# Patient Record
Sex: MALE | Race: WHITE | NOT HISPANIC OR LATINO
[De-identification: names, ages, dates, MRNs, and addresses within clinical notes are randomized per-mention and may not be internally consistent; named-entity substitution may affect disease eponyms.]

---

## 2017-02-11 ENCOUNTER — TRANSCRIPTION ENCOUNTER (OUTPATIENT)
Age: 72
End: 2017-02-11

## 2017-04-17 ENCOUNTER — EMERGENCY (EMERGENCY)
Facility: HOSPITAL | Age: 72
LOS: 1 days | Discharge: HOME | End: 2017-04-17
Attending: EMERGENCY MEDICINE | Admitting: EMERGENCY MEDICINE
Payer: MEDICARE

## 2017-04-17 VITALS
RESPIRATION RATE: 16 BRPM | HEIGHT: 64 IN | WEIGHT: 190.04 LBS | HEART RATE: 96 BPM | TEMPERATURE: 98 F | OXYGEN SATURATION: 99 % | DIASTOLIC BLOOD PRESSURE: 82 MMHG | SYSTOLIC BLOOD PRESSURE: 149 MMHG

## 2017-04-17 VITALS
TEMPERATURE: 98 F | RESPIRATION RATE: 15 BRPM | HEART RATE: 91 BPM | DIASTOLIC BLOOD PRESSURE: 77 MMHG | SYSTOLIC BLOOD PRESSURE: 151 MMHG | OXYGEN SATURATION: 99 %

## 2017-04-17 DIAGNOSIS — Z85.118 PERSONAL HISTORY OF OTHER MALIGNANT NEOPLASM OF BRONCHUS AND LUNG: ICD-10-CM

## 2017-04-17 DIAGNOSIS — E11.9 TYPE 2 DIABETES MELLITUS WITHOUT COMPLICATIONS: ICD-10-CM

## 2017-04-17 DIAGNOSIS — E78.00 PURE HYPERCHOLESTEROLEMIA, UNSPECIFIED: ICD-10-CM

## 2017-04-17 DIAGNOSIS — N20.0 CALCULUS OF KIDNEY: ICD-10-CM

## 2017-04-17 DIAGNOSIS — Z98.89 OTHER SPECIFIED POSTPROCEDURAL STATES: Chronic | ICD-10-CM

## 2017-04-17 DIAGNOSIS — I11.0 HYPERTENSIVE HEART DISEASE WITH HEART FAILURE: ICD-10-CM

## 2017-04-17 DIAGNOSIS — Z86.73 PERSONAL HISTORY OF TRANSIENT ISCHEMIC ATTACK (TIA), AND CEREBRAL INFARCTION WITHOUT RESIDUAL DEFICITS: ICD-10-CM

## 2017-04-17 DIAGNOSIS — Z98.890 OTHER SPECIFIED POSTPROCEDURAL STATES: ICD-10-CM

## 2017-04-17 DIAGNOSIS — I50.9 HEART FAILURE, UNSPECIFIED: ICD-10-CM

## 2017-04-17 LAB
ALBUMIN SERPL ELPH-MCNC: 4.2 G/DL — SIGNIFICANT CHANGE UP (ref 3.3–5)
ALP SERPL-CCNC: 61 U/L — SIGNIFICANT CHANGE UP (ref 40–120)
ALT FLD-CCNC: 27 U/L — SIGNIFICANT CHANGE UP (ref 12–78)
ANION GAP SERPL CALC-SCNC: 9 MMOL/L — SIGNIFICANT CHANGE UP (ref 5–17)
ANISOCYTOSIS BLD QL: SLIGHT — SIGNIFICANT CHANGE UP
APPEARANCE UR: CLEAR — SIGNIFICANT CHANGE UP
AST SERPL-CCNC: 16 U/L — SIGNIFICANT CHANGE UP (ref 15–37)
BASO STIPL BLD QL SMEAR: PRESENT — SIGNIFICANT CHANGE UP
BASOPHILS # BLD AUTO: 0.1 K/UL — SIGNIFICANT CHANGE UP (ref 0–0.2)
BASOPHILS NFR BLD AUTO: 1.2 % — SIGNIFICANT CHANGE UP (ref 0–2)
BILIRUB SERPL-MCNC: 0.4 MG/DL — SIGNIFICANT CHANGE UP (ref 0.2–1.2)
BILIRUB UR-MCNC: NEGATIVE — SIGNIFICANT CHANGE UP
BUN SERPL-MCNC: 27 MG/DL — HIGH (ref 7–23)
CALCIUM SERPL-MCNC: 10.5 MG/DL — HIGH (ref 8.5–10.1)
CHLORIDE SERPL-SCNC: 101 MMOL/L — SIGNIFICANT CHANGE UP (ref 96–108)
CO2 SERPL-SCNC: 29 MMOL/L — SIGNIFICANT CHANGE UP (ref 22–31)
COLOR SPEC: YELLOW — SIGNIFICANT CHANGE UP
CREAT SERPL-MCNC: 1.4 MG/DL — HIGH (ref 0.5–1.3)
DACRYOCYTES BLD QL SMEAR: SLIGHT — SIGNIFICANT CHANGE UP
DIFF PNL FLD: ABNORMAL
ELLIPTOCYTES BLD QL SMEAR: SLIGHT — SIGNIFICANT CHANGE UP
EOSINOPHIL # BLD AUTO: 0.1 K/UL — SIGNIFICANT CHANGE UP (ref 0–0.5)
EOSINOPHIL NFR BLD AUTO: 1.7 % — SIGNIFICANT CHANGE UP (ref 0–6)
EPI CELLS # UR: SIGNIFICANT CHANGE UP
GLUCOSE SERPL-MCNC: 156 MG/DL — HIGH (ref 70–99)
GLUCOSE UR QL: 300 MG/DL
HCT VFR BLD CALC: 42.7 % — SIGNIFICANT CHANGE UP (ref 39–50)
HGB BLD-MCNC: 13 G/DL — SIGNIFICANT CHANGE UP (ref 13–17)
HYPERCHROMIA BLD QL AUTO: SLIGHT — SIGNIFICANT CHANGE UP
HYPOCHROMIA BLD QL: SLIGHT — SIGNIFICANT CHANGE UP
KETONES UR-MCNC: NEGATIVE — SIGNIFICANT CHANGE UP
LEUKOCYTE ESTERASE UR-ACNC: NEGATIVE — SIGNIFICANT CHANGE UP
LG PLATELETS BLD QL AUTO: SLIGHT — SIGNIFICANT CHANGE UP
LYMPHOCYTES # BLD AUTO: 2.8 K/UL — SIGNIFICANT CHANGE UP (ref 1–3.3)
LYMPHOCYTES # BLD AUTO: 33.8 % — SIGNIFICANT CHANGE UP (ref 13–44)
MCHC RBC-ENTMCNC: 19 PG — LOW (ref 27–34)
MCHC RBC-ENTMCNC: 30.5 GM/DL — LOW (ref 32–36)
MCV RBC AUTO: 62.3 FL — LOW (ref 80–100)
MICROCYTES BLD QL: SIGNIFICANT CHANGE UP
MONOCYTES # BLD AUTO: 0.6 K/UL — SIGNIFICANT CHANGE UP (ref 0–0.9)
MONOCYTES NFR BLD AUTO: 7.4 % — SIGNIFICANT CHANGE UP (ref 1–9)
NEUTROPHILS # BLD AUTO: 4.7 K/UL — SIGNIFICANT CHANGE UP (ref 1.8–7.4)
NEUTROPHILS NFR BLD AUTO: 56 % — SIGNIFICANT CHANGE UP (ref 43–77)
NITRITE UR-MCNC: NEGATIVE — SIGNIFICANT CHANGE UP
OVALOCYTES BLD QL SMEAR: SLIGHT — SIGNIFICANT CHANGE UP
PH UR: 5 — SIGNIFICANT CHANGE UP (ref 4.8–8)
PLAT MORPH BLD: NORMAL — SIGNIFICANT CHANGE UP
PLATELET # BLD AUTO: 257 K/UL — SIGNIFICANT CHANGE UP (ref 150–400)
POIKILOCYTOSIS BLD QL AUTO: SLIGHT — SIGNIFICANT CHANGE UP
POLYCHROMASIA BLD QL SMEAR: SLIGHT — SIGNIFICANT CHANGE UP
POTASSIUM SERPL-MCNC: 4.7 MMOL/L — SIGNIFICANT CHANGE UP (ref 3.5–5.3)
POTASSIUM SERPL-SCNC: 4.7 MMOL/L — SIGNIFICANT CHANGE UP (ref 3.5–5.3)
PROT SERPL-MCNC: 7.8 G/DL — SIGNIFICANT CHANGE UP (ref 6–8.3)
PROT UR-MCNC: 25 MG/DL
RBC # BLD: 6.85 M/UL — HIGH (ref 4.2–5.8)
RBC # FLD: 15.1 % — HIGH (ref 10.3–14.5)
RBC BLD AUTO: ABNORMAL
RBC CASTS # UR COMP ASSIST: SIGNIFICANT CHANGE UP /HPF (ref 0–4)
SODIUM SERPL-SCNC: 139 MMOL/L — SIGNIFICANT CHANGE UP (ref 135–145)
SP GR SPEC: 1.01 — SIGNIFICANT CHANGE UP (ref 1.01–1.02)
UROBILINOGEN FLD QL: NEGATIVE — SIGNIFICANT CHANGE UP
WBC # BLD: 8.4 K/UL — SIGNIFICANT CHANGE UP (ref 3.8–10.5)
WBC # FLD AUTO: 8.4 K/UL — SIGNIFICANT CHANGE UP (ref 3.8–10.5)
WBC UR QL: SIGNIFICANT CHANGE UP

## 2017-04-17 PROCEDURE — 99284 EMERGENCY DEPT VISIT MOD MDM: CPT | Mod: 25

## 2017-04-17 PROCEDURE — 74176 CT ABD & PELVIS W/O CONTRAST: CPT | Mod: 26

## 2017-04-17 PROCEDURE — 96360 HYDRATION IV INFUSION INIT: CPT

## 2017-04-17 PROCEDURE — 87086 URINE CULTURE/COLONY COUNT: CPT

## 2017-04-17 PROCEDURE — 81001 URINALYSIS AUTO W/SCOPE: CPT

## 2017-04-17 PROCEDURE — 80053 COMPREHEN METABOLIC PANEL: CPT

## 2017-04-17 PROCEDURE — 85027 COMPLETE CBC AUTOMATED: CPT

## 2017-04-17 PROCEDURE — 99284 EMERGENCY DEPT VISIT MOD MDM: CPT

## 2017-04-17 PROCEDURE — 74176 CT ABD & PELVIS W/O CONTRAST: CPT

## 2017-04-17 RX ORDER — SODIUM CHLORIDE 9 MG/ML
1000 INJECTION INTRAMUSCULAR; INTRAVENOUS; SUBCUTANEOUS ONCE
Qty: 0 | Refills: 0 | Status: COMPLETED | OUTPATIENT
Start: 2017-04-17 | End: 2017-04-17

## 2017-04-17 RX ADMIN — SODIUM CHLORIDE 1000 MILLILITER(S): 9 INJECTION INTRAMUSCULAR; INTRAVENOUS; SUBCUTANEOUS at 12:49

## 2017-04-17 NOTE — ED PROVIDER NOTE - OBJECTIVE STATEMENT
71 male presents to ER c/o lower abdominal disomfort, left flank pain, started last night, getting worse today, took a vicodin with some releif. Having some nausea, no vomiting.

## 2017-04-17 NOTE — ED ADULT TRIAGE NOTE - CHIEF COMPLAINT QUOTE
"I've had pain in the left side of my back on & off for a few days. I had stomach pain yesterday & some nausea today. The pain got worse today so I took Vicodin. I have left lower leg cellulitis, I'm on clindamycin"

## 2017-04-17 NOTE — ED ADULT NURSE NOTE - OBJECTIVE STATEMENT
I've had pain in the left side of my back on & off for a few days. I had stomach pain yesterday & some nausea today. The pain got worse today so I took Vicodin. I have left lower leg cellulitis, I'm on clindamycin". Afebrile, no recent sicks. left lower leg red with some abrasions

## 2017-04-17 NOTE — ED ADULT NURSE NOTE - PSH
History of throat surgery  1996  S/P angioplasty with stent  2005 and 2006  S/P partial lobectomy of lung  lobectomy of left upper lobe. April 2014

## 2017-04-17 NOTE — ED ADULT NURSE NOTE - PMH
Cellulitis    CHF (congestive heart failure)    Chronic back pain    Diabetes  on metformin, humalog insulin pump  Heart attack  1990,2005  Hiatal hernia    High cholesterol    HTN (hypertension)    Insulin pump in place    Lumbar herniated disc    Lung cancer, upper lobe, left    OA (osteoarthritis)    Sleep apnea  uses c pap at home  Spinal stenosis in cervical region    Spinal stenosis of lumbar region    Stented coronary artery  six cardiac stents  Stented coronary artery  Drug eluding x 6 stents  TIA (transient ischemic attack)  Jan 2014  Type 2 diabetes mellitus    Vertigo

## 2017-04-18 LAB
CULTURE RESULTS: NO GROWTH — SIGNIFICANT CHANGE UP
SPECIMEN SOURCE: SIGNIFICANT CHANGE UP

## 2018-01-23 ENCOUNTER — EMERGENCY (EMERGENCY)
Facility: HOSPITAL | Age: 73
LOS: 1 days | Discharge: HOME | End: 2018-01-23
Attending: EMERGENCY MEDICINE | Admitting: EMERGENCY MEDICINE
Payer: COMMERCIAL

## 2018-01-23 VITALS
HEIGHT: 64 IN | DIASTOLIC BLOOD PRESSURE: 96 MMHG | HEART RATE: 73 BPM | WEIGHT: 190.04 LBS | OXYGEN SATURATION: 100 % | SYSTOLIC BLOOD PRESSURE: 173 MMHG | TEMPERATURE: 98 F | RESPIRATION RATE: 15 BRPM

## 2018-01-23 DIAGNOSIS — Z86.73 PERSONAL HISTORY OF TRANSIENT ISCHEMIC ATTACK (TIA), AND CEREBRAL INFARCTION WITHOUT RESIDUAL DEFICITS: ICD-10-CM

## 2018-01-23 DIAGNOSIS — E78.00 PURE HYPERCHOLESTEROLEMIA, UNSPECIFIED: ICD-10-CM

## 2018-01-23 DIAGNOSIS — Z98.89 OTHER SPECIFIED POSTPROCEDURAL STATES: Chronic | ICD-10-CM

## 2018-01-23 DIAGNOSIS — Z95.5 PRESENCE OF CORONARY ANGIOPLASTY IMPLANT AND GRAFT: ICD-10-CM

## 2018-01-23 DIAGNOSIS — Z87.891 PERSONAL HISTORY OF NICOTINE DEPENDENCE: ICD-10-CM

## 2018-01-23 DIAGNOSIS — Z85.118 PERSONAL HISTORY OF OTHER MALIGNANT NEOPLASM OF BRONCHUS AND LUNG: ICD-10-CM

## 2018-01-23 DIAGNOSIS — Z79.82 LONG TERM (CURRENT) USE OF ASPIRIN: ICD-10-CM

## 2018-01-23 DIAGNOSIS — Z79.4 LONG TERM (CURRENT) USE OF INSULIN: ICD-10-CM

## 2018-01-23 DIAGNOSIS — Z04.1 ENCOUNTER FOR EXAMINATION AND OBSERVATION FOLLOWING TRANSPORT ACCIDENT: ICD-10-CM

## 2018-01-23 DIAGNOSIS — E11.9 TYPE 2 DIABETES MELLITUS WITHOUT COMPLICATIONS: ICD-10-CM

## 2018-01-23 DIAGNOSIS — I11.0 HYPERTENSIVE HEART DISEASE WITH HEART FAILURE: ICD-10-CM

## 2018-01-23 DIAGNOSIS — I50.9 HEART FAILURE, UNSPECIFIED: ICD-10-CM

## 2018-01-23 PROCEDURE — 99053 MED SERV 10PM-8AM 24 HR FAC: CPT

## 2018-01-23 PROCEDURE — 99282 EMERGENCY DEPT VISIT SF MDM: CPT

## 2018-01-23 NOTE — ED PROVIDER NOTE - CHPI ED SYMPTOMS NEG
no back pain/no bruising/no difficulty bearing weight/no laceration/no decreased eating/drinking/no neck tenderness/no dizziness/no headache/no pain/no loss of consciousness/no disorientation
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
## 2018-01-23 NOTE — ED PROVIDER NOTE - MUSCULOSKELETAL, MLM
Spine appears normal, range of motion is not limited, no muscle or joint tenderness. speech clear, gait steady.  c-spine non tender, supple.  NO MLT in spine.  pelvis stable, non tender. hips FROM

## 2018-01-23 NOTE — ED ADULT NURSE NOTE - CHPI ED SYMPTOMS NEG
no dizziness/no crying/no decreased eating/drinking/no laceration/no loss of consciousness/no bruising/no neck tenderness/no headache/no fussiness/no disorientation/no difficulty bearing weight/no sleeping issues/no pain/no back pain

## 2018-04-27 ENCOUNTER — OUTPATIENT (OUTPATIENT)
Dept: OUTPATIENT SERVICES | Facility: HOSPITAL | Age: 73
LOS: 1 days | Discharge: HOME | End: 2018-04-27
Payer: MEDICARE

## 2018-04-27 DIAGNOSIS — Z98.89 OTHER SPECIFIED POSTPROCEDURAL STATES: Chronic | ICD-10-CM

## 2018-04-27 DIAGNOSIS — L97.801 NON-PRESSURE CHRONIC ULCER OF OTHER PART OF UNSPECIFIED LOWER LEG LIMITED TO BREAKDOWN OF SKIN: ICD-10-CM

## 2018-04-27 PROCEDURE — G0463: CPT

## 2018-04-28 DIAGNOSIS — M48.00 SPINAL STENOSIS, SITE UNSPECIFIED: ICD-10-CM

## 2018-04-28 DIAGNOSIS — E78.5 HYPERLIPIDEMIA, UNSPECIFIED: ICD-10-CM

## 2018-04-28 DIAGNOSIS — Z79.82 LONG TERM (CURRENT) USE OF ASPIRIN: ICD-10-CM

## 2018-04-28 DIAGNOSIS — Z90.2 ACQUIRED ABSENCE OF LUNG [PART OF]: ICD-10-CM

## 2018-04-28 DIAGNOSIS — L97.821 NON-PRESSURE CHRONIC ULCER OF OTHER PART OF LEFT LOWER LEG LIMITED TO BREAKDOWN OF SKIN: ICD-10-CM

## 2018-04-28 DIAGNOSIS — M12.9 ARTHROPATHY, UNSPECIFIED: ICD-10-CM

## 2018-04-28 DIAGNOSIS — G47.30 SLEEP APNEA, UNSPECIFIED: ICD-10-CM

## 2018-04-28 DIAGNOSIS — Z87.891 PERSONAL HISTORY OF NICOTINE DEPENDENCE: ICD-10-CM

## 2018-04-28 DIAGNOSIS — I50.9 HEART FAILURE, UNSPECIFIED: ICD-10-CM

## 2018-04-28 DIAGNOSIS — I25.10 ATHEROSCLEROTIC HEART DISEASE OF NATIVE CORONARY ARTERY WITHOUT ANGINA PECTORIS: ICD-10-CM

## 2018-04-28 DIAGNOSIS — Z85.118 PERSONAL HISTORY OF OTHER MALIGNANT NEOPLASM OF BRONCHUS AND LUNG: ICD-10-CM

## 2018-04-28 DIAGNOSIS — Z79.899 OTHER LONG TERM (CURRENT) DRUG THERAPY: ICD-10-CM

## 2018-04-28 DIAGNOSIS — E11.51 TYPE 2 DIABETES MELLITUS WITH DIABETIC PERIPHERAL ANGIOPATHY WITHOUT GANGRENE: ICD-10-CM

## 2018-04-28 DIAGNOSIS — Z98.61 CORONARY ANGIOPLASTY STATUS: ICD-10-CM

## 2018-04-28 DIAGNOSIS — I10 ESSENTIAL (PRIMARY) HYPERTENSION: ICD-10-CM

## 2018-04-28 DIAGNOSIS — Z79.4 LONG TERM (CURRENT) USE OF INSULIN: ICD-10-CM

## 2018-04-28 DIAGNOSIS — E66.9 OBESITY, UNSPECIFIED: ICD-10-CM

## 2018-04-28 DIAGNOSIS — Z83.3 FAMILY HISTORY OF DIABETES MELLITUS: ICD-10-CM

## 2018-05-04 ENCOUNTER — OUTPATIENT (OUTPATIENT)
Dept: OUTPATIENT SERVICES | Facility: HOSPITAL | Age: 73
LOS: 1 days | Discharge: HOME | End: 2018-05-04
Payer: MEDICARE

## 2018-05-04 DIAGNOSIS — L97.801 NON-PRESSURE CHRONIC ULCER OF OTHER PART OF UNSPECIFIED LOWER LEG LIMITED TO BREAKDOWN OF SKIN: ICD-10-CM

## 2018-05-04 DIAGNOSIS — Z98.89 OTHER SPECIFIED POSTPROCEDURAL STATES: Chronic | ICD-10-CM

## 2018-05-04 DIAGNOSIS — L97.821 NON-PRESSURE CHRONIC ULCER OF OTHER PART OF LEFT LOWER LEG LIMITED TO BREAKDOWN OF SKIN: ICD-10-CM

## 2018-05-04 PROCEDURE — 97602 WOUND(S) CARE NON-SELECTIVE: CPT

## 2018-05-05 DIAGNOSIS — Z90.2 ACQUIRED ABSENCE OF LUNG [PART OF]: ICD-10-CM

## 2018-05-05 DIAGNOSIS — I50.9 HEART FAILURE, UNSPECIFIED: ICD-10-CM

## 2018-05-05 DIAGNOSIS — Z83.3 FAMILY HISTORY OF DIABETES MELLITUS: ICD-10-CM

## 2018-05-05 DIAGNOSIS — E66.9 OBESITY, UNSPECIFIED: ICD-10-CM

## 2018-05-05 DIAGNOSIS — M12.9 ARTHROPATHY, UNSPECIFIED: ICD-10-CM

## 2018-05-05 DIAGNOSIS — G47.30 SLEEP APNEA, UNSPECIFIED: ICD-10-CM

## 2018-05-05 DIAGNOSIS — M48.00 SPINAL STENOSIS, SITE UNSPECIFIED: ICD-10-CM

## 2018-05-05 DIAGNOSIS — I25.10 ATHEROSCLEROTIC HEART DISEASE OF NATIVE CORONARY ARTERY WITHOUT ANGINA PECTORIS: ICD-10-CM

## 2018-05-05 DIAGNOSIS — Z85.118 PERSONAL HISTORY OF OTHER MALIGNANT NEOPLASM OF BRONCHUS AND LUNG: ICD-10-CM

## 2018-05-05 DIAGNOSIS — Z79.82 LONG TERM (CURRENT) USE OF ASPIRIN: ICD-10-CM

## 2018-05-05 DIAGNOSIS — Z87.891 PERSONAL HISTORY OF NICOTINE DEPENDENCE: ICD-10-CM

## 2018-05-05 DIAGNOSIS — I10 ESSENTIAL (PRIMARY) HYPERTENSION: ICD-10-CM

## 2018-05-05 DIAGNOSIS — E78.5 HYPERLIPIDEMIA, UNSPECIFIED: ICD-10-CM

## 2018-05-05 DIAGNOSIS — Z98.61 CORONARY ANGIOPLASTY STATUS: ICD-10-CM

## 2018-05-05 DIAGNOSIS — Z79.899 OTHER LONG TERM (CURRENT) DRUG THERAPY: ICD-10-CM

## 2018-05-05 DIAGNOSIS — L97.821 NON-PRESSURE CHRONIC ULCER OF OTHER PART OF LEFT LOWER LEG LIMITED TO BREAKDOWN OF SKIN: ICD-10-CM

## 2018-05-05 DIAGNOSIS — E11.51 TYPE 2 DIABETES MELLITUS WITH DIABETIC PERIPHERAL ANGIOPATHY WITHOUT GANGRENE: ICD-10-CM

## 2018-05-05 DIAGNOSIS — Z79.4 LONG TERM (CURRENT) USE OF INSULIN: ICD-10-CM

## 2018-05-11 ENCOUNTER — OUTPATIENT (OUTPATIENT)
Dept: OUTPATIENT SERVICES | Facility: HOSPITAL | Age: 73
LOS: 1 days | Discharge: HOME | End: 2018-05-11
Payer: MEDICARE

## 2018-05-11 DIAGNOSIS — Z98.89 OTHER SPECIFIED POSTPROCEDURAL STATES: Chronic | ICD-10-CM

## 2018-05-11 DIAGNOSIS — L97.821 NON-PRESSURE CHRONIC ULCER OF OTHER PART OF LEFT LOWER LEG LIMITED TO BREAKDOWN OF SKIN: ICD-10-CM

## 2018-05-11 PROCEDURE — 97602 WOUND(S) CARE NON-SELECTIVE: CPT

## 2018-05-12 DIAGNOSIS — G47.30 SLEEP APNEA, UNSPECIFIED: ICD-10-CM

## 2018-05-12 DIAGNOSIS — Z79.82 LONG TERM (CURRENT) USE OF ASPIRIN: ICD-10-CM

## 2018-05-12 DIAGNOSIS — I25.10 ATHEROSCLEROTIC HEART DISEASE OF NATIVE CORONARY ARTERY WITHOUT ANGINA PECTORIS: ICD-10-CM

## 2018-05-12 DIAGNOSIS — Z83.3 FAMILY HISTORY OF DIABETES MELLITUS: ICD-10-CM

## 2018-05-12 DIAGNOSIS — Z85.118 PERSONAL HISTORY OF OTHER MALIGNANT NEOPLASM OF BRONCHUS AND LUNG: ICD-10-CM

## 2018-05-12 DIAGNOSIS — I10 ESSENTIAL (PRIMARY) HYPERTENSION: ICD-10-CM

## 2018-05-12 DIAGNOSIS — L97.821 NON-PRESSURE CHRONIC ULCER OF OTHER PART OF LEFT LOWER LEG LIMITED TO BREAKDOWN OF SKIN: ICD-10-CM

## 2018-05-12 DIAGNOSIS — Z79.4 LONG TERM (CURRENT) USE OF INSULIN: ICD-10-CM

## 2018-05-12 DIAGNOSIS — Z79.899 OTHER LONG TERM (CURRENT) DRUG THERAPY: ICD-10-CM

## 2018-05-12 DIAGNOSIS — Z90.2 ACQUIRED ABSENCE OF LUNG [PART OF]: ICD-10-CM

## 2018-05-12 DIAGNOSIS — E11.51 TYPE 2 DIABETES MELLITUS WITH DIABETIC PERIPHERAL ANGIOPATHY WITHOUT GANGRENE: ICD-10-CM

## 2018-05-12 DIAGNOSIS — E78.5 HYPERLIPIDEMIA, UNSPECIFIED: ICD-10-CM

## 2018-05-12 DIAGNOSIS — I50.9 HEART FAILURE, UNSPECIFIED: ICD-10-CM

## 2018-05-12 DIAGNOSIS — Z87.891 PERSONAL HISTORY OF NICOTINE DEPENDENCE: ICD-10-CM

## 2018-05-12 DIAGNOSIS — E66.9 OBESITY, UNSPECIFIED: ICD-10-CM

## 2018-05-12 DIAGNOSIS — M12.9 ARTHROPATHY, UNSPECIFIED: ICD-10-CM

## 2018-05-12 DIAGNOSIS — Z98.61 CORONARY ANGIOPLASTY STATUS: ICD-10-CM

## 2018-05-12 DIAGNOSIS — M48.00 SPINAL STENOSIS, SITE UNSPECIFIED: ICD-10-CM

## 2018-05-17 ENCOUNTER — OUTPATIENT (OUTPATIENT)
Dept: OUTPATIENT SERVICES | Facility: HOSPITAL | Age: 73
LOS: 1 days | End: 2018-05-17
Payer: MEDICARE

## 2018-05-17 DIAGNOSIS — Z98.89 OTHER SPECIFIED POSTPROCEDURAL STATES: Chronic | ICD-10-CM

## 2018-05-17 DIAGNOSIS — L97.821 NON-PRESSURE CHRONIC ULCER OF OTHER PART OF LEFT LOWER LEG LIMITED TO BREAKDOWN OF SKIN: ICD-10-CM

## 2018-05-17 PROCEDURE — 93923 UPR/LXTR ART STDY 3+ LVLS: CPT | Mod: 26

## 2018-05-17 PROCEDURE — 93923 UPR/LXTR ART STDY 3+ LVLS: CPT

## 2018-05-18 ENCOUNTER — OUTPATIENT (OUTPATIENT)
Dept: OUTPATIENT SERVICES | Facility: HOSPITAL | Age: 73
LOS: 1 days | Discharge: HOME | End: 2018-05-18
Payer: MEDICARE

## 2018-05-18 DIAGNOSIS — Z98.89 OTHER SPECIFIED POSTPROCEDURAL STATES: Chronic | ICD-10-CM

## 2018-05-18 DIAGNOSIS — L97.821 NON-PRESSURE CHRONIC ULCER OF OTHER PART OF LEFT LOWER LEG LIMITED TO BREAKDOWN OF SKIN: ICD-10-CM

## 2018-05-18 PROCEDURE — 97602 WOUND(S) CARE NON-SELECTIVE: CPT

## 2018-05-19 DIAGNOSIS — I50.9 HEART FAILURE, UNSPECIFIED: ICD-10-CM

## 2018-05-19 DIAGNOSIS — M48.00 SPINAL STENOSIS, SITE UNSPECIFIED: ICD-10-CM

## 2018-05-19 DIAGNOSIS — E66.9 OBESITY, UNSPECIFIED: ICD-10-CM

## 2018-05-19 DIAGNOSIS — M12.9 ARTHROPATHY, UNSPECIFIED: ICD-10-CM

## 2018-05-19 DIAGNOSIS — Z79.899 OTHER LONG TERM (CURRENT) DRUG THERAPY: ICD-10-CM

## 2018-05-19 DIAGNOSIS — Z83.3 FAMILY HISTORY OF DIABETES MELLITUS: ICD-10-CM

## 2018-05-19 DIAGNOSIS — Z79.4 LONG TERM (CURRENT) USE OF INSULIN: ICD-10-CM

## 2018-05-19 DIAGNOSIS — Z79.82 LONG TERM (CURRENT) USE OF ASPIRIN: ICD-10-CM

## 2018-05-19 DIAGNOSIS — Z85.118 PERSONAL HISTORY OF OTHER MALIGNANT NEOPLASM OF BRONCHUS AND LUNG: ICD-10-CM

## 2018-05-19 DIAGNOSIS — G47.30 SLEEP APNEA, UNSPECIFIED: ICD-10-CM

## 2018-05-19 DIAGNOSIS — E78.5 HYPERLIPIDEMIA, UNSPECIFIED: ICD-10-CM

## 2018-05-19 DIAGNOSIS — I25.10 ATHEROSCLEROTIC HEART DISEASE OF NATIVE CORONARY ARTERY WITHOUT ANGINA PECTORIS: ICD-10-CM

## 2018-05-19 DIAGNOSIS — I10 ESSENTIAL (PRIMARY) HYPERTENSION: ICD-10-CM

## 2018-05-19 DIAGNOSIS — Z98.61 CORONARY ANGIOPLASTY STATUS: ICD-10-CM

## 2018-05-19 DIAGNOSIS — Z87.891 PERSONAL HISTORY OF NICOTINE DEPENDENCE: ICD-10-CM

## 2018-05-19 DIAGNOSIS — E11.51 TYPE 2 DIABETES MELLITUS WITH DIABETIC PERIPHERAL ANGIOPATHY WITHOUT GANGRENE: ICD-10-CM

## 2018-05-19 DIAGNOSIS — Z90.2 ACQUIRED ABSENCE OF LUNG [PART OF]: ICD-10-CM

## 2018-05-19 DIAGNOSIS — L97.821 NON-PRESSURE CHRONIC ULCER OF OTHER PART OF LEFT LOWER LEG LIMITED TO BREAKDOWN OF SKIN: ICD-10-CM

## 2018-05-25 ENCOUNTER — OUTPATIENT (OUTPATIENT)
Dept: OUTPATIENT SERVICES | Facility: HOSPITAL | Age: 73
LOS: 1 days | Discharge: HOME | End: 2018-05-25
Payer: MEDICARE

## 2018-05-25 DIAGNOSIS — Z98.89 OTHER SPECIFIED POSTPROCEDURAL STATES: Chronic | ICD-10-CM

## 2018-05-25 DIAGNOSIS — L97.821 NON-PRESSURE CHRONIC ULCER OF OTHER PART OF LEFT LOWER LEG LIMITED TO BREAKDOWN OF SKIN: ICD-10-CM

## 2018-05-25 PROCEDURE — 97602 WOUND(S) CARE NON-SELECTIVE: CPT

## 2018-05-26 DIAGNOSIS — I10 ESSENTIAL (PRIMARY) HYPERTENSION: ICD-10-CM

## 2018-05-26 DIAGNOSIS — E66.9 OBESITY, UNSPECIFIED: ICD-10-CM

## 2018-05-26 DIAGNOSIS — Z79.82 LONG TERM (CURRENT) USE OF ASPIRIN: ICD-10-CM

## 2018-05-26 DIAGNOSIS — Z83.3 FAMILY HISTORY OF DIABETES MELLITUS: ICD-10-CM

## 2018-05-26 DIAGNOSIS — M12.9 ARTHROPATHY, UNSPECIFIED: ICD-10-CM

## 2018-05-26 DIAGNOSIS — Z98.61 CORONARY ANGIOPLASTY STATUS: ICD-10-CM

## 2018-05-26 DIAGNOSIS — Z87.891 PERSONAL HISTORY OF NICOTINE DEPENDENCE: ICD-10-CM

## 2018-05-26 DIAGNOSIS — G47.30 SLEEP APNEA, UNSPECIFIED: ICD-10-CM

## 2018-05-26 DIAGNOSIS — I50.9 HEART FAILURE, UNSPECIFIED: ICD-10-CM

## 2018-05-26 DIAGNOSIS — Z79.4 LONG TERM (CURRENT) USE OF INSULIN: ICD-10-CM

## 2018-05-26 DIAGNOSIS — I25.10 ATHEROSCLEROTIC HEART DISEASE OF NATIVE CORONARY ARTERY WITHOUT ANGINA PECTORIS: ICD-10-CM

## 2018-05-26 DIAGNOSIS — Z85.118 PERSONAL HISTORY OF OTHER MALIGNANT NEOPLASM OF BRONCHUS AND LUNG: ICD-10-CM

## 2018-05-26 DIAGNOSIS — Z90.2 ACQUIRED ABSENCE OF LUNG [PART OF]: ICD-10-CM

## 2018-05-26 DIAGNOSIS — L97.821 NON-PRESSURE CHRONIC ULCER OF OTHER PART OF LEFT LOWER LEG LIMITED TO BREAKDOWN OF SKIN: ICD-10-CM

## 2018-05-26 DIAGNOSIS — E11.51 TYPE 2 DIABETES MELLITUS WITH DIABETIC PERIPHERAL ANGIOPATHY WITHOUT GANGRENE: ICD-10-CM

## 2018-05-26 DIAGNOSIS — E78.5 HYPERLIPIDEMIA, UNSPECIFIED: ICD-10-CM

## 2018-05-26 DIAGNOSIS — Z79.899 OTHER LONG TERM (CURRENT) DRUG THERAPY: ICD-10-CM

## 2018-05-26 DIAGNOSIS — M48.00 SPINAL STENOSIS, SITE UNSPECIFIED: ICD-10-CM

## 2018-06-01 ENCOUNTER — OUTPATIENT (OUTPATIENT)
Dept: OUTPATIENT SERVICES | Facility: HOSPITAL | Age: 73
LOS: 1 days | Discharge: HOME | End: 2018-06-01
Payer: MEDICARE

## 2018-06-01 DIAGNOSIS — Z98.89 OTHER SPECIFIED POSTPROCEDURAL STATES: Chronic | ICD-10-CM

## 2018-06-01 DIAGNOSIS — L97.821 NON-PRESSURE CHRONIC ULCER OF OTHER PART OF LEFT LOWER LEG LIMITED TO BREAKDOWN OF SKIN: ICD-10-CM

## 2018-06-01 PROCEDURE — G0463: CPT

## 2018-06-02 DIAGNOSIS — Z85.118 PERSONAL HISTORY OF OTHER MALIGNANT NEOPLASM OF BRONCHUS AND LUNG: ICD-10-CM

## 2018-06-02 DIAGNOSIS — Z79.82 LONG TERM (CURRENT) USE OF ASPIRIN: ICD-10-CM

## 2018-06-02 DIAGNOSIS — E78.5 HYPERLIPIDEMIA, UNSPECIFIED: ICD-10-CM

## 2018-06-02 DIAGNOSIS — Z79.4 LONG TERM (CURRENT) USE OF INSULIN: ICD-10-CM

## 2018-06-02 DIAGNOSIS — I50.9 HEART FAILURE, UNSPECIFIED: ICD-10-CM

## 2018-06-02 DIAGNOSIS — I10 ESSENTIAL (PRIMARY) HYPERTENSION: ICD-10-CM

## 2018-06-02 DIAGNOSIS — M48.00 SPINAL STENOSIS, SITE UNSPECIFIED: ICD-10-CM

## 2018-06-02 DIAGNOSIS — L97.821 NON-PRESSURE CHRONIC ULCER OF OTHER PART OF LEFT LOWER LEG LIMITED TO BREAKDOWN OF SKIN: ICD-10-CM

## 2018-06-02 DIAGNOSIS — Z83.3 FAMILY HISTORY OF DIABETES MELLITUS: ICD-10-CM

## 2018-06-02 DIAGNOSIS — Z90.2 ACQUIRED ABSENCE OF LUNG [PART OF]: ICD-10-CM

## 2018-06-02 DIAGNOSIS — Z79.899 OTHER LONG TERM (CURRENT) DRUG THERAPY: ICD-10-CM

## 2018-06-02 DIAGNOSIS — M12.9 ARTHROPATHY, UNSPECIFIED: ICD-10-CM

## 2018-06-02 DIAGNOSIS — G47.30 SLEEP APNEA, UNSPECIFIED: ICD-10-CM

## 2018-06-02 DIAGNOSIS — Z87.891 PERSONAL HISTORY OF NICOTINE DEPENDENCE: ICD-10-CM

## 2018-06-02 DIAGNOSIS — I25.10 ATHEROSCLEROTIC HEART DISEASE OF NATIVE CORONARY ARTERY WITHOUT ANGINA PECTORIS: ICD-10-CM

## 2018-06-02 DIAGNOSIS — E11.51 TYPE 2 DIABETES MELLITUS WITH DIABETIC PERIPHERAL ANGIOPATHY WITHOUT GANGRENE: ICD-10-CM

## 2018-06-02 DIAGNOSIS — E66.9 OBESITY, UNSPECIFIED: ICD-10-CM

## 2018-06-02 DIAGNOSIS — Z98.61 CORONARY ANGIOPLASTY STATUS: ICD-10-CM

## 2018-06-08 ENCOUNTER — OUTPATIENT (OUTPATIENT)
Dept: OUTPATIENT SERVICES | Facility: HOSPITAL | Age: 73
LOS: 1 days | Discharge: HOME | End: 2018-06-08
Payer: MEDICARE

## 2018-06-08 DIAGNOSIS — Z98.89 OTHER SPECIFIED POSTPROCEDURAL STATES: Chronic | ICD-10-CM

## 2018-06-08 DIAGNOSIS — L97.821 NON-PRESSURE CHRONIC ULCER OF OTHER PART OF LEFT LOWER LEG LIMITED TO BREAKDOWN OF SKIN: ICD-10-CM

## 2018-06-08 PROCEDURE — G0463: CPT

## 2018-06-10 DIAGNOSIS — E11.51 TYPE 2 DIABETES MELLITUS WITH DIABETIC PERIPHERAL ANGIOPATHY WITHOUT GANGRENE: ICD-10-CM

## 2018-06-10 DIAGNOSIS — L97.821 NON-PRESSURE CHRONIC ULCER OF OTHER PART OF LEFT LOWER LEG LIMITED TO BREAKDOWN OF SKIN: ICD-10-CM

## 2018-06-10 DIAGNOSIS — E66.9 OBESITY, UNSPECIFIED: ICD-10-CM

## 2018-06-10 DIAGNOSIS — I25.10 ATHEROSCLEROTIC HEART DISEASE OF NATIVE CORONARY ARTERY WITHOUT ANGINA PECTORIS: ICD-10-CM

## 2018-06-10 DIAGNOSIS — Z79.899 OTHER LONG TERM (CURRENT) DRUG THERAPY: ICD-10-CM

## 2018-06-10 DIAGNOSIS — Z90.2 ACQUIRED ABSENCE OF LUNG [PART OF]: ICD-10-CM

## 2018-06-10 DIAGNOSIS — E78.5 HYPERLIPIDEMIA, UNSPECIFIED: ICD-10-CM

## 2018-06-10 DIAGNOSIS — Z79.4 LONG TERM (CURRENT) USE OF INSULIN: ICD-10-CM

## 2018-06-10 DIAGNOSIS — Z87.891 PERSONAL HISTORY OF NICOTINE DEPENDENCE: ICD-10-CM

## 2018-06-10 DIAGNOSIS — I50.9 HEART FAILURE, UNSPECIFIED: ICD-10-CM

## 2018-06-10 DIAGNOSIS — Z85.118 PERSONAL HISTORY OF OTHER MALIGNANT NEOPLASM OF BRONCHUS AND LUNG: ICD-10-CM

## 2018-06-10 DIAGNOSIS — M12.9 ARTHROPATHY, UNSPECIFIED: ICD-10-CM

## 2018-06-10 DIAGNOSIS — Z98.61 CORONARY ANGIOPLASTY STATUS: ICD-10-CM

## 2018-06-10 DIAGNOSIS — Z83.3 FAMILY HISTORY OF DIABETES MELLITUS: ICD-10-CM

## 2018-06-10 DIAGNOSIS — Z79.82 LONG TERM (CURRENT) USE OF ASPIRIN: ICD-10-CM

## 2018-06-10 DIAGNOSIS — G47.30 SLEEP APNEA, UNSPECIFIED: ICD-10-CM

## 2018-06-10 DIAGNOSIS — M48.00 SPINAL STENOSIS, SITE UNSPECIFIED: ICD-10-CM

## 2018-06-10 DIAGNOSIS — I10 ESSENTIAL (PRIMARY) HYPERTENSION: ICD-10-CM

## 2018-06-22 ENCOUNTER — OUTPATIENT (OUTPATIENT)
Dept: OUTPATIENT SERVICES | Facility: HOSPITAL | Age: 73
LOS: 1 days | Discharge: HOME | End: 2018-06-22
Payer: MEDICARE

## 2018-06-22 DIAGNOSIS — Z98.89 OTHER SPECIFIED POSTPROCEDURAL STATES: Chronic | ICD-10-CM

## 2018-06-22 DIAGNOSIS — L97.821 NON-PRESSURE CHRONIC ULCER OF OTHER PART OF LEFT LOWER LEG LIMITED TO BREAKDOWN OF SKIN: ICD-10-CM

## 2018-06-22 PROCEDURE — G0463: CPT

## 2018-06-23 DIAGNOSIS — E11.51 TYPE 2 DIABETES MELLITUS WITH DIABETIC PERIPHERAL ANGIOPATHY WITHOUT GANGRENE: ICD-10-CM

## 2018-06-23 DIAGNOSIS — L97.821 NON-PRESSURE CHRONIC ULCER OF OTHER PART OF LEFT LOWER LEG LIMITED TO BREAKDOWN OF SKIN: ICD-10-CM

## 2018-06-23 DIAGNOSIS — I50.9 HEART FAILURE, UNSPECIFIED: ICD-10-CM

## 2018-06-23 DIAGNOSIS — M12.9 ARTHROPATHY, UNSPECIFIED: ICD-10-CM

## 2018-06-23 DIAGNOSIS — I10 ESSENTIAL (PRIMARY) HYPERTENSION: ICD-10-CM

## 2018-06-23 DIAGNOSIS — Z87.891 PERSONAL HISTORY OF NICOTINE DEPENDENCE: ICD-10-CM

## 2018-06-23 DIAGNOSIS — M48.00 SPINAL STENOSIS, SITE UNSPECIFIED: ICD-10-CM

## 2018-06-23 DIAGNOSIS — Z79.82 LONG TERM (CURRENT) USE OF ASPIRIN: ICD-10-CM

## 2018-06-23 DIAGNOSIS — Z90.2 ACQUIRED ABSENCE OF LUNG [PART OF]: ICD-10-CM

## 2018-06-23 DIAGNOSIS — G47.30 SLEEP APNEA, UNSPECIFIED: ICD-10-CM

## 2018-06-23 DIAGNOSIS — Z83.3 FAMILY HISTORY OF DIABETES MELLITUS: ICD-10-CM

## 2018-06-23 DIAGNOSIS — Z98.61 CORONARY ANGIOPLASTY STATUS: ICD-10-CM

## 2018-06-23 DIAGNOSIS — E78.5 HYPERLIPIDEMIA, UNSPECIFIED: ICD-10-CM

## 2018-06-23 DIAGNOSIS — Z79.899 OTHER LONG TERM (CURRENT) DRUG THERAPY: ICD-10-CM

## 2018-06-23 DIAGNOSIS — E66.9 OBESITY, UNSPECIFIED: ICD-10-CM

## 2018-06-23 DIAGNOSIS — Z85.118 PERSONAL HISTORY OF OTHER MALIGNANT NEOPLASM OF BRONCHUS AND LUNG: ICD-10-CM

## 2018-06-23 DIAGNOSIS — I25.10 ATHEROSCLEROTIC HEART DISEASE OF NATIVE CORONARY ARTERY WITHOUT ANGINA PECTORIS: ICD-10-CM

## 2018-06-23 DIAGNOSIS — Z79.4 LONG TERM (CURRENT) USE OF INSULIN: ICD-10-CM

## 2019-01-09 ENCOUNTER — TRANSCRIPTION ENCOUNTER (OUTPATIENT)
Age: 74
End: 2019-01-09

## 2019-03-03 ENCOUNTER — TRANSCRIPTION ENCOUNTER (OUTPATIENT)
Age: 74
End: 2019-03-03

## 2019-10-12 ENCOUNTER — TRANSCRIPTION ENCOUNTER (OUTPATIENT)
Age: 74
End: 2019-10-12
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
## 2020-09-18 NOTE — ED ADULT NURSE NOTE - READING LANGUAGE PREFERRED
on broad spectrum antibiotics,   f/u intraop c/s  f/u vanco level and vanco was adjusted  I will discuss with ortho regarding the recent cultures from the right hip and is negative but the abdominal wound cultures are positive fro VREF and Pseudomonas.  He is on vanco and Rifampin  culture positive for MRSA with negative blood culture repeat but 4 bottles were positive on admission  JOHN PAUL unlikely endocarditis but might need to be treated as such.  The AICD bed is stable with no signs of infection  On Vanco and Rifampin, PICC line Monday.   hardware removed and follow on cultures which is negative to date  he is stable and he has area of necrosis over the wound and is followed by plastics  sepsis resolved and he is on antibiotic  I discussed with surgery and ID.  he is daptomycin and will follow with gallium scan  results of the hida reviewed and discussed with ID .  I discussed gallium scan with surgery and no evidence of cholecystitis. He is for surgery on the right knee English

## 2021-01-01 ENCOUNTER — EMERGENCY (EMERGENCY)
Facility: HOSPITAL | Age: 76
LOS: 0 days | Discharge: HOME | End: 2021-12-30
Attending: EMERGENCY MEDICINE
Payer: MEDICARE

## 2021-01-01 ENCOUNTER — TRANSCRIPTION ENCOUNTER (OUTPATIENT)
Age: 76
End: 2021-01-01

## 2021-01-01 VITALS
OXYGEN SATURATION: 100 % | SYSTOLIC BLOOD PRESSURE: 97 MMHG | RESPIRATION RATE: 16 BRPM | HEART RATE: 71 BPM | HEIGHT: 64 IN | WEIGHT: 199.96 LBS | DIASTOLIC BLOOD PRESSURE: 62 MMHG | TEMPERATURE: 99 F

## 2021-01-01 VITALS
OXYGEN SATURATION: 100 % | RESPIRATION RATE: 18 BRPM | DIASTOLIC BLOOD PRESSURE: 65 MMHG | TEMPERATURE: 98 F | HEART RATE: 75 BPM | SYSTOLIC BLOOD PRESSURE: 106 MMHG

## 2021-01-01 DIAGNOSIS — Z98.89 OTHER SPECIFIED POSTPROCEDURAL STATES: Chronic | ICD-10-CM

## 2021-01-01 DIAGNOSIS — I50.9 HEART FAILURE, UNSPECIFIED: ICD-10-CM

## 2021-01-01 DIAGNOSIS — I25.10 ATHEROSCLEROTIC HEART DISEASE OF NATIVE CORONARY ARTERY WITHOUT ANGINA PECTORIS: ICD-10-CM

## 2021-01-01 DIAGNOSIS — I11.0 HYPERTENSIVE HEART DISEASE WITH HEART FAILURE: ICD-10-CM

## 2021-01-01 DIAGNOSIS — E11.9 TYPE 2 DIABETES MELLITUS WITHOUT COMPLICATIONS: ICD-10-CM

## 2021-01-01 DIAGNOSIS — Z79.4 LONG TERM (CURRENT) USE OF INSULIN: ICD-10-CM

## 2021-01-01 DIAGNOSIS — S09.90XA UNSPECIFIED INJURY OF HEAD, INITIAL ENCOUNTER: ICD-10-CM
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
## 2021-01-01 DIAGNOSIS — Z79.84 LONG TERM (CURRENT) USE OF ORAL HYPOGLYCEMIC DRUGS: ICD-10-CM

## 2021-01-01 PROCEDURE — 70450 CT HEAD/BRAIN W/O DYE: CPT | Mod: 26,MA

## 2021-01-01 PROCEDURE — 70450 CT HEAD/BRAIN W/O DYE: CPT | Mod: MA

## 2021-01-01 PROCEDURE — 72125 CT NECK SPINE W/O DYE: CPT | Mod: 26,MA

## 2021-01-01 PROCEDURE — 76376 3D RENDER W/INTRP POSTPROCES: CPT | Mod: 26

## 2021-01-01 PROCEDURE — 70486 CT MAXILLOFACIAL W/O DYE: CPT | Mod: MA

## 2021-01-01 PROCEDURE — 99285 EMERGENCY DEPT VISIT HI MDM: CPT | Mod: 25

## 2021-01-01 PROCEDURE — 76376 3D RENDER W/INTRP POSTPROCES: CPT

## 2021-01-01 PROCEDURE — 72125 CT NECK SPINE W/O DYE: CPT | Mod: MA

## 2021-01-01 PROCEDURE — 70486 CT MAXILLOFACIAL W/O DYE: CPT | Mod: 26,MA

## 2021-01-01 PROCEDURE — 99284 EMERGENCY DEPT VISIT MOD MDM: CPT

## 2021-12-30 PROBLEM — L03.90 CELLULITIS, UNSPECIFIED: Chronic | Status: ACTIVE | Noted: 2017-04-17

## 2021-12-30 NOTE — ED PROVIDER NOTE - NSICDXPASTMEDICALHX_GEN_ALL_CORE_FT
PAST MEDICAL HISTORY:  Cellulitis     CHF (congestive heart failure)     Chronic back pain     Diabetes on metformin, humalog insulin pump    Heart attack 1990,2005    Hiatal hernia     High cholesterol     HTN (hypertension)     Insulin pump in place     Lumbar herniated disc     Lung cancer, upper lobe, left     OA (osteoarthritis)     Sleep apnea uses c pap at home    Spinal stenosis in cervical region     Spinal stenosis of lumbar region     Stented coronary artery Drug eluding x 6 stents    Stented coronary artery six cardiac stents    TIA (transient ischemic attack) Jan 2014    Type 2 diabetes mellitus     Vertigo

## 2021-12-30 NOTE — ED PROVIDER NOTE - OBJECTIVE STATEMENT
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 Denies any LOC, was ambulatory after the incident. Complains of some soreness to the forehead but otherwise denies any injury.

## 2021-12-30 NOTE — ED ADULT TRIAGE NOTE - CHIEF COMPLAINT QUOTE
Pt. to the ED BIBA S/P Trip and Fall from standing height + head injury and + Blood thinners- GCS 15-- NA called as ordered. H of  CHF

## 2021-12-30 NOTE — ED PROVIDER NOTE - NSICDXPASTSURGICALHX_GEN_ALL_CORE_FT
PAST SURGICAL HISTORY:  History of throat surgery 1996    S/P angioplasty with stent 2005 and 2006    S/P partial lobectomy of lung lobectomy of left upper lobe. April 2014

## 2021-12-30 NOTE — ED PROVIDER NOTE - NSFOLLOWUPINSTRUCTIONS_ED_ALL_ED_FT
Take acetaminophen 1000mg every 6 hours if needed for pain  Follow up with your primary doctor as soon as possible  Keep wounds clean and dry. Gently cleanse with regular soap and water but do not vigorously scrub. You may apply a topical antibiotic ointment until wounds are scabbed over.   Return to the ER with any new, worsening or persistent symptoms.

## 2021-12-30 NOTE — ED PROVIDER NOTE - PATIENT PORTAL LINK FT
You can access the FollowMyHealth Patient Portal offered by [facility] by registering at the following website: [email or web address]. By joining [facility]’s FollowMyHealth portal, you will also be able to view your health information using other applications (apps) compatible with our system.

## 2021-12-30 NOTE — ED PROVIDER NOTE - CLINICAL SUMMARY MEDICAL DECISION MAKING FREE TEXT BOX
Mechanical fall with closed head trauma, negative CT imaging for bleed or fractures. Superficial abrasions not needing repair, local wound care only.

## 2022-01-01 ENCOUNTER — INPATIENT (INPATIENT)
Facility: HOSPITAL | Age: 77
LOS: 8 days | DRG: 283 | End: 2022-07-09
Attending: FAMILY MEDICINE | Admitting: FAMILY MEDICINE
Payer: MEDICARE

## 2022-01-01 VITALS
SYSTOLIC BLOOD PRESSURE: 85 MMHG | DIASTOLIC BLOOD PRESSURE: 56 MMHG | HEIGHT: 64 IN | OXYGEN SATURATION: 91 % | WEIGHT: 171.96 LBS | RESPIRATION RATE: 16 BRPM | HEART RATE: 97 BPM | TEMPERATURE: 98 F

## 2022-01-01 DIAGNOSIS — R50.9 FEVER, UNSPECIFIED: ICD-10-CM

## 2022-01-01 DIAGNOSIS — E11.9 TYPE 2 DIABETES MELLITUS WITHOUT COMPLICATIONS: ICD-10-CM

## 2022-01-01 DIAGNOSIS — I21.4 NON-ST ELEVATION (NSTEMI) MYOCARDIAL INFARCTION: ICD-10-CM

## 2022-01-01 DIAGNOSIS — C34.10 MALIGNANT NEOPLASM OF UPPER LOBE, UNSPECIFIED BRONCHUS OR LUNG: ICD-10-CM

## 2022-01-01 DIAGNOSIS — R57.0 CARDIOGENIC SHOCK: ICD-10-CM

## 2022-01-01 DIAGNOSIS — I10 ESSENTIAL (PRIMARY) HYPERTENSION: ICD-10-CM

## 2022-01-01 DIAGNOSIS — J96.01 ACUTE RESPIRATORY FAILURE WITH HYPOXIA: ICD-10-CM

## 2022-01-01 DIAGNOSIS — Z98.89 OTHER SPECIFIED POSTPROCEDURAL STATES: Chronic | ICD-10-CM

## 2022-01-01 LAB
A1C WITH ESTIMATED AVERAGE GLUCOSE RESULT: 8.6 % — HIGH (ref 4–5.6)
ALBUMIN SERPL ELPH-MCNC: 2.2 G/DL — LOW (ref 3.3–5)
ALBUMIN SERPL ELPH-MCNC: 2.4 G/DL — LOW (ref 3.3–5)
ALBUMIN SERPL ELPH-MCNC: 2.6 G/DL — LOW (ref 3.3–5)
ALBUMIN SERPL ELPH-MCNC: 2.8 G/DL — LOW (ref 3.3–5)
ALBUMIN SERPL ELPH-MCNC: 2.9 G/DL — LOW (ref 3.3–5)
ALBUMIN SERPL ELPH-MCNC: 3 G/DL — LOW (ref 3.3–5)
ALBUMIN SERPL ELPH-MCNC: 3.2 G/DL — LOW (ref 3.3–5)
ALBUMIN SERPL ELPH-MCNC: 3.5 G/DL — SIGNIFICANT CHANGE UP (ref 3.3–5)
ALBUMIN SERPL ELPH-MCNC: 3.8 G/DL — SIGNIFICANT CHANGE UP (ref 3.3–5)
ALP SERPL-CCNC: 108 U/L — SIGNIFICANT CHANGE UP (ref 40–120)
ALP SERPL-CCNC: 129 U/L — HIGH (ref 40–120)
ALP SERPL-CCNC: 131 U/L — HIGH (ref 40–120)
ALP SERPL-CCNC: 132 U/L — HIGH (ref 40–120)
ALP SERPL-CCNC: 137 U/L — HIGH (ref 40–120)
ALP SERPL-CCNC: 141 U/L — HIGH (ref 40–120)
ALP SERPL-CCNC: 141 U/L — HIGH (ref 40–120)
ALP SERPL-CCNC: 156 U/L — HIGH (ref 40–120)
ALP SERPL-CCNC: 166 U/L — HIGH (ref 40–120)
ALP SERPL-CCNC: 176 U/L — HIGH (ref 40–120)
ALP SERPL-CCNC: 186 U/L — HIGH (ref 40–120)
ALP SERPL-CCNC: 189 U/L — HIGH (ref 40–120)
ALP SERPL-CCNC: 87 U/L — SIGNIFICANT CHANGE UP (ref 40–120)
ALT FLD-CCNC: 1381 U/L — HIGH (ref 12–78)
ALT FLD-CCNC: 1409 U/L — HIGH (ref 12–78)
ALT FLD-CCNC: 1644 U/L — HIGH (ref 12–78)
ALT FLD-CCNC: 1788 U/L — HIGH (ref 12–78)
ALT FLD-CCNC: 3121 U/L — HIGH (ref 12–78)
ALT FLD-CCNC: 3127 U/L — HIGH (ref 12–78)
ALT FLD-CCNC: 3709 U/L — HIGH (ref 12–78)
ALT FLD-CCNC: 4205 U/L — HIGH (ref 12–78)
ALT FLD-CCNC: 5077 U/L — HIGH (ref 12–78)
ALT FLD-CCNC: 69 U/L — SIGNIFICANT CHANGE UP (ref 12–78)
ALT FLD-CCNC: 82 U/L — HIGH (ref 12–78)
ALT FLD-CCNC: 998 U/L — HIGH (ref 12–78)
ALT FLD-CCNC: >3510 U/L — HIGH (ref 12–78)
ANION GAP SERPL CALC-SCNC: 10 MMOL/L — SIGNIFICANT CHANGE UP (ref 5–17)
ANION GAP SERPL CALC-SCNC: 11 MMOL/L — SIGNIFICANT CHANGE UP (ref 5–17)
ANION GAP SERPL CALC-SCNC: 11 MMOL/L — SIGNIFICANT CHANGE UP (ref 5–17)
ANION GAP SERPL CALC-SCNC: 13 MMOL/L — SIGNIFICANT CHANGE UP (ref 5–17)
ANION GAP SERPL CALC-SCNC: 15 MMOL/L — SIGNIFICANT CHANGE UP (ref 5–17)
ANION GAP SERPL CALC-SCNC: 22 MMOL/L — HIGH (ref 5–17)
ANION GAP SERPL CALC-SCNC: 22 MMOL/L — HIGH (ref 5–17)
ANION GAP SERPL CALC-SCNC: 3 MMOL/L — LOW (ref 5–17)
ANION GAP SERPL CALC-SCNC: 6 MMOL/L — SIGNIFICANT CHANGE UP (ref 5–17)
ANION GAP SERPL CALC-SCNC: 6 MMOL/L — SIGNIFICANT CHANGE UP (ref 5–17)
ANION GAP SERPL CALC-SCNC: 7 MMOL/L — SIGNIFICANT CHANGE UP (ref 5–17)
ANION GAP SERPL CALC-SCNC: 7 MMOL/L — SIGNIFICANT CHANGE UP (ref 5–17)
ANION GAP SERPL CALC-SCNC: 8 MMOL/L — SIGNIFICANT CHANGE UP (ref 5–17)
ANION GAP SERPL CALC-SCNC: 9 MMOL/L — SIGNIFICANT CHANGE UP (ref 5–17)
APPEARANCE UR: CLEAR — SIGNIFICANT CHANGE UP
APTT BLD: 29.7 SEC — SIGNIFICANT CHANGE UP (ref 27.5–35.5)
APTT BLD: 30.1 SEC — SIGNIFICANT CHANGE UP (ref 27.5–35.5)
APTT BLD: 31.3 SEC — SIGNIFICANT CHANGE UP (ref 27.5–35.5)
APTT BLD: 32.1 SEC — SIGNIFICANT CHANGE UP (ref 27.5–35.5)
APTT BLD: 36.3 SEC — HIGH (ref 27.5–35.5)
APTT BLD: 55.4 SEC — HIGH (ref 27.5–35.5)
APTT BLD: 55.8 SEC — HIGH (ref 27.5–35.5)
APTT BLD: 56.5 SEC — HIGH (ref 27.5–35.5)
APTT BLD: 57.4 SEC — HIGH (ref 27.5–35.5)
APTT BLD: 58.8 SEC — HIGH (ref 27.5–35.5)
APTT BLD: 62.8 SEC — HIGH (ref 27.5–35.5)
APTT BLD: 67.6 SEC — HIGH (ref 27.5–35.5)
APTT BLD: 83.1 SEC — HIGH (ref 27.5–35.5)
APTT BLD: 89 SEC — HIGH (ref 27.5–35.5)
AST SERPL-CCNC: 1253 U/L — HIGH (ref 15–37)
AST SERPL-CCNC: 1275 U/L — HIGH (ref 15–37)
AST SERPL-CCNC: 141 U/L — HIGH (ref 15–37)
AST SERPL-CCNC: 1741 U/L — HIGH (ref 15–37)
AST SERPL-CCNC: 1939 U/L — HIGH (ref 15–37)
AST SERPL-CCNC: 229 U/L — HIGH (ref 15–37)
AST SERPL-CCNC: 2627 U/L — HIGH (ref 15–37)
AST SERPL-CCNC: 285 U/L — HIGH (ref 15–37)
AST SERPL-CCNC: 3086 U/L — HIGH (ref 15–37)
AST SERPL-CCNC: 36 U/L — SIGNIFICANT CHANGE UP (ref 15–37)
AST SERPL-CCNC: 41 U/L — HIGH (ref 15–37)
AST SERPL-CCNC: 6083 U/L — HIGH (ref 15–37)
AST SERPL-CCNC: 880 U/L — HIGH (ref 15–37)
BASE EXCESS BLDA CALC-SCNC: -1.2 MMOL/L — SIGNIFICANT CHANGE UP (ref -2–3)
BASE EXCESS BLDA CALC-SCNC: -10.1 MMOL/L — LOW (ref -2–3)
BASE EXCESS BLDA CALC-SCNC: -12.7 MMOL/L — LOW (ref -2–3)
BASE EXCESS BLDA CALC-SCNC: -5.7 MMOL/L — LOW (ref -2–3)
BASE EXCESS BLDA CALC-SCNC: 10.8 MMOL/L — HIGH (ref -2–3)
BASE EXCESS BLDA CALC-SCNC: 3.2 MMOL/L — HIGH (ref -2–3)
BASOPHILS # BLD AUTO: 0.01 K/UL — SIGNIFICANT CHANGE UP (ref 0–0.2)
BASOPHILS # BLD AUTO: 0.02 K/UL — SIGNIFICANT CHANGE UP (ref 0–0.2)
BASOPHILS # BLD AUTO: 0.02 K/UL — SIGNIFICANT CHANGE UP (ref 0–0.2)
BASOPHILS # BLD AUTO: 0.05 K/UL — SIGNIFICANT CHANGE UP (ref 0–0.2)
BASOPHILS # BLD AUTO: 0.05 K/UL — SIGNIFICANT CHANGE UP (ref 0–0.2)
BASOPHILS # BLD AUTO: 0.06 K/UL — SIGNIFICANT CHANGE UP (ref 0–0.2)
BASOPHILS # BLD AUTO: 0.06 K/UL — SIGNIFICANT CHANGE UP (ref 0–0.2)
BASOPHILS # BLD AUTO: 0.07 K/UL — SIGNIFICANT CHANGE UP (ref 0–0.2)
BASOPHILS # BLD AUTO: 0.17 K/UL — SIGNIFICANT CHANGE UP (ref 0–0.2)
BASOPHILS NFR BLD AUTO: 0.1 % — SIGNIFICANT CHANGE UP (ref 0–2)
BASOPHILS NFR BLD AUTO: 0.1 % — SIGNIFICANT CHANGE UP (ref 0–2)
BASOPHILS NFR BLD AUTO: 0.2 % — SIGNIFICANT CHANGE UP (ref 0–2)
BASOPHILS NFR BLD AUTO: 0.4 % — SIGNIFICANT CHANGE UP (ref 0–2)
BASOPHILS NFR BLD AUTO: 0.4 % — SIGNIFICANT CHANGE UP (ref 0–2)
BASOPHILS NFR BLD AUTO: 0.5 % — SIGNIFICANT CHANGE UP (ref 0–2)
BASOPHILS NFR BLD AUTO: 0.6 % — SIGNIFICANT CHANGE UP (ref 0–2)
BASOPHILS NFR BLD AUTO: 0.8 % — SIGNIFICANT CHANGE UP (ref 0–2)
BASOPHILS NFR BLD AUTO: 1 % — SIGNIFICANT CHANGE UP (ref 0–2)
BILIRUB SERPL-MCNC: 0.9 MG/DL — SIGNIFICANT CHANGE UP (ref 0.2–1.2)
BILIRUB SERPL-MCNC: 1.1 MG/DL — SIGNIFICANT CHANGE UP (ref 0.2–1.2)
BILIRUB SERPL-MCNC: 1.3 MG/DL — HIGH (ref 0.2–1.2)
BILIRUB SERPL-MCNC: 1.4 MG/DL — HIGH (ref 0.2–1.2)
BILIRUB SERPL-MCNC: 1.4 MG/DL — HIGH (ref 0.2–1.2)
BILIRUB SERPL-MCNC: 1.7 MG/DL — HIGH (ref 0.2–1.2)
BILIRUB SERPL-MCNC: 1.9 MG/DL — HIGH (ref 0.2–1.2)
BILIRUB SERPL-MCNC: 2.5 MG/DL — HIGH (ref 0.2–1.2)
BILIRUB SERPL-MCNC: 2.8 MG/DL — HIGH (ref 0.2–1.2)
BILIRUB SERPL-MCNC: 3 MG/DL — HIGH (ref 0.2–1.2)
BILIRUB UR-MCNC: NEGATIVE — SIGNIFICANT CHANGE UP
BLOOD GAS COMMENTS ARTERIAL: SIGNIFICANT CHANGE UP
BUN SERPL-MCNC: 43 MG/DL — HIGH (ref 7–23)
BUN SERPL-MCNC: 46 MG/DL — HIGH (ref 7–23)
BUN SERPL-MCNC: 48 MG/DL — HIGH (ref 7–23)
BUN SERPL-MCNC: 49 MG/DL — HIGH (ref 7–23)
BUN SERPL-MCNC: 56 MG/DL — HIGH (ref 7–23)
BUN SERPL-MCNC: 57 MG/DL — HIGH (ref 7–23)
BUN SERPL-MCNC: 61 MG/DL — HIGH (ref 7–23)
BUN SERPL-MCNC: 61 MG/DL — HIGH (ref 7–23)
BUN SERPL-MCNC: 64 MG/DL — HIGH (ref 7–23)
BUN SERPL-MCNC: 72 MG/DL — HIGH (ref 7–23)
BUN SERPL-MCNC: 76 MG/DL — HIGH (ref 7–23)
BUN SERPL-MCNC: 79 MG/DL — HIGH (ref 7–23)
BUN SERPL-MCNC: 81 MG/DL — HIGH (ref 7–23)
BUN SERPL-MCNC: 82 MG/DL — HIGH (ref 7–23)
CALCIUM SERPL-MCNC: 8.1 MG/DL — LOW (ref 8.5–10.1)
CALCIUM SERPL-MCNC: 8.5 MG/DL — SIGNIFICANT CHANGE UP (ref 8.5–10.1)
CALCIUM SERPL-MCNC: 8.5 MG/DL — SIGNIFICANT CHANGE UP (ref 8.5–10.1)
CALCIUM SERPL-MCNC: 8.6 MG/DL — SIGNIFICANT CHANGE UP (ref 8.5–10.1)
CALCIUM SERPL-MCNC: 8.8 MG/DL — SIGNIFICANT CHANGE UP (ref 8.5–10.1)
CALCIUM SERPL-MCNC: 8.9 MG/DL — SIGNIFICANT CHANGE UP (ref 8.5–10.1)
CALCIUM SERPL-MCNC: 9 MG/DL — SIGNIFICANT CHANGE UP (ref 8.5–10.1)
CALCIUM SERPL-MCNC: 9.1 MG/DL — SIGNIFICANT CHANGE UP (ref 8.5–10.1)
CALCIUM SERPL-MCNC: 9.2 MG/DL — SIGNIFICANT CHANGE UP (ref 8.5–10.1)
CALCIUM SERPL-MCNC: 9.3 MG/DL — SIGNIFICANT CHANGE UP (ref 8.5–10.1)
CALCIUM SERPL-MCNC: 9.3 MG/DL — SIGNIFICANT CHANGE UP (ref 8.5–10.1)
CALCIUM SERPL-MCNC: 9.6 MG/DL — SIGNIFICANT CHANGE UP (ref 8.5–10.1)
CHLORIDE SERPL-SCNC: 104 MMOL/L — SIGNIFICANT CHANGE UP (ref 96–108)
CHLORIDE SERPL-SCNC: 105 MMOL/L — SIGNIFICANT CHANGE UP (ref 96–108)
CHLORIDE SERPL-SCNC: 106 MMOL/L — SIGNIFICANT CHANGE UP (ref 96–108)
CHLORIDE SERPL-SCNC: 106 MMOL/L — SIGNIFICANT CHANGE UP (ref 96–108)
CHLORIDE SERPL-SCNC: 113 MMOL/L — HIGH (ref 96–108)
CHLORIDE SERPL-SCNC: 114 MMOL/L — HIGH (ref 96–108)
CHLORIDE SERPL-SCNC: 116 MMOL/L — HIGH (ref 96–108)
CHLORIDE SERPL-SCNC: 116 MMOL/L — HIGH (ref 96–108)
CHLORIDE SERPL-SCNC: 121 MMOL/L — HIGH (ref 96–108)
CHLORIDE SERPL-SCNC: 97 MMOL/L — SIGNIFICANT CHANGE UP (ref 96–108)
CHLORIDE SERPL-SCNC: 98 MMOL/L — SIGNIFICANT CHANGE UP (ref 96–108)
CHLORIDE SERPL-SCNC: 98 MMOL/L — SIGNIFICANT CHANGE UP (ref 96–108)
CHLORIDE SERPL-SCNC: 99 MMOL/L — SIGNIFICANT CHANGE UP (ref 96–108)
CHLORIDE SERPL-SCNC: 99 MMOL/L — SIGNIFICANT CHANGE UP (ref 96–108)
CK MB BLD-MCNC: 6.3 % — HIGH (ref 0–3.5)
CK MB CFR SERPL CALC: 6.6 NG/ML — HIGH (ref 0–3.6)
CK SERPL-CCNC: 104 U/L — SIGNIFICANT CHANGE UP (ref 26–308)
CO2 SERPL-SCNC: 16 MMOL/L — LOW (ref 22–31)
CO2 SERPL-SCNC: 16 MMOL/L — LOW (ref 22–31)
CO2 SERPL-SCNC: 22 MMOL/L — SIGNIFICANT CHANGE UP (ref 22–31)
CO2 SERPL-SCNC: 24 MMOL/L — SIGNIFICANT CHANGE UP (ref 22–31)
CO2 SERPL-SCNC: 26 MMOL/L — SIGNIFICANT CHANGE UP (ref 22–31)
CO2 SERPL-SCNC: 26 MMOL/L — SIGNIFICANT CHANGE UP (ref 22–31)
CO2 SERPL-SCNC: 27 MMOL/L — SIGNIFICANT CHANGE UP (ref 22–31)
CO2 SERPL-SCNC: 30 MMOL/L — SIGNIFICANT CHANGE UP (ref 22–31)
CO2 SERPL-SCNC: 31 MMOL/L — SIGNIFICANT CHANGE UP (ref 22–31)
CO2 SERPL-SCNC: 32 MMOL/L — HIGH (ref 22–31)
CO2 SERPL-SCNC: 33 MMOL/L — HIGH (ref 22–31)
CO2 SERPL-SCNC: 34 MMOL/L — HIGH (ref 22–31)
CO2 SERPL-SCNC: 35 MMOL/L — HIGH (ref 22–31)
CO2 SERPL-SCNC: 35 MMOL/L — HIGH (ref 22–31)
COLOR SPEC: YELLOW — SIGNIFICANT CHANGE UP
CREAT SERPL-MCNC: 1.7 MG/DL — HIGH (ref 0.5–1.3)
CREAT SERPL-MCNC: 1.7 MG/DL — HIGH (ref 0.5–1.3)
CREAT SERPL-MCNC: 1.8 MG/DL — HIGH (ref 0.5–1.3)
CREAT SERPL-MCNC: 1.9 MG/DL — HIGH (ref 0.5–1.3)
CREAT SERPL-MCNC: 1.9 MG/DL — HIGH (ref 0.5–1.3)
CREAT SERPL-MCNC: 2 MG/DL — HIGH (ref 0.5–1.3)
CREAT SERPL-MCNC: 2 MG/DL — HIGH (ref 0.5–1.3)
CREAT SERPL-MCNC: 2.1 MG/DL — HIGH (ref 0.5–1.3)
CREAT SERPL-MCNC: 2.4 MG/DL — HIGH (ref 0.5–1.3)
CREAT SERPL-MCNC: 2.4 MG/DL — HIGH (ref 0.5–1.3)
CREAT SERPL-MCNC: 2.5 MG/DL — HIGH (ref 0.5–1.3)
CREAT SERPL-MCNC: 2.6 MG/DL — HIGH (ref 0.5–1.3)
CULTURE RESULTS: SIGNIFICANT CHANGE UP
DIFF PNL FLD: NEGATIVE — SIGNIFICANT CHANGE UP
EGFR: 25 ML/MIN/1.73M2 — LOW
EGFR: 26 ML/MIN/1.73M2 — LOW
EGFR: 27 ML/MIN/1.73M2 — LOW
EGFR: 27 ML/MIN/1.73M2 — LOW
EGFR: 32 ML/MIN/1.73M2 — LOW
EGFR: 34 ML/MIN/1.73M2 — LOW
EGFR: 34 ML/MIN/1.73M2 — LOW
EGFR: 36 ML/MIN/1.73M2 — LOW
EGFR: 36 ML/MIN/1.73M2 — LOW
EGFR: 39 ML/MIN/1.73M2 — LOW
EGFR: 41 ML/MIN/1.73M2 — LOW
EGFR: 41 ML/MIN/1.73M2 — LOW
EOSINOPHIL # BLD AUTO: 0 K/UL — SIGNIFICANT CHANGE UP (ref 0–0.5)
EOSINOPHIL # BLD AUTO: 0.01 K/UL — SIGNIFICANT CHANGE UP (ref 0–0.5)
EOSINOPHIL # BLD AUTO: 0.01 K/UL — SIGNIFICANT CHANGE UP (ref 0–0.5)
EOSINOPHIL # BLD AUTO: 0.04 K/UL — SIGNIFICANT CHANGE UP (ref 0–0.5)
EOSINOPHIL # BLD AUTO: 0.09 K/UL — SIGNIFICANT CHANGE UP (ref 0–0.5)
EOSINOPHIL # BLD AUTO: 0.11 K/UL — SIGNIFICANT CHANGE UP (ref 0–0.5)
EOSINOPHIL # BLD AUTO: 0.16 K/UL — SIGNIFICANT CHANGE UP (ref 0–0.5)
EOSINOPHIL NFR BLD AUTO: 0 % — SIGNIFICANT CHANGE UP (ref 0–6)
EOSINOPHIL NFR BLD AUTO: 0.1 % — SIGNIFICANT CHANGE UP (ref 0–6)
EOSINOPHIL NFR BLD AUTO: 0.1 % — SIGNIFICANT CHANGE UP (ref 0–6)
EOSINOPHIL NFR BLD AUTO: 0.4 % — SIGNIFICANT CHANGE UP (ref 0–6)
EOSINOPHIL NFR BLD AUTO: 0.6 % — SIGNIFICANT CHANGE UP (ref 0–6)
EOSINOPHIL NFR BLD AUTO: 0.8 % — SIGNIFICANT CHANGE UP (ref 0–6)
EOSINOPHIL NFR BLD AUTO: 1 % — SIGNIFICANT CHANGE UP (ref 0–6)
ESTIMATED AVERAGE GLUCOSE: 200 MG/DL — HIGH (ref 68–114)
FLUAV AG NPH QL: SIGNIFICANT CHANGE UP
FLUBV AG NPH QL: SIGNIFICANT CHANGE UP
GAS PNL BLDA: SIGNIFICANT CHANGE UP
GAS PNL BLDA: SIGNIFICANT CHANGE UP
GLUCOSE SERPL-MCNC: 196 MG/DL — HIGH (ref 70–99)
GLUCOSE SERPL-MCNC: 204 MG/DL — HIGH (ref 70–99)
GLUCOSE SERPL-MCNC: 237 MG/DL — HIGH (ref 70–99)
GLUCOSE SERPL-MCNC: 242 MG/DL — HIGH (ref 70–99)
GLUCOSE SERPL-MCNC: 248 MG/DL — HIGH (ref 70–99)
GLUCOSE SERPL-MCNC: 251 MG/DL — HIGH (ref 70–99)
GLUCOSE SERPL-MCNC: 251 MG/DL — HIGH (ref 70–99)
GLUCOSE SERPL-MCNC: 258 MG/DL — HIGH (ref 70–99)
GLUCOSE SERPL-MCNC: 265 MG/DL — HIGH (ref 70–99)
GLUCOSE SERPL-MCNC: 280 MG/DL — HIGH (ref 70–99)
GLUCOSE SERPL-MCNC: 281 MG/DL — HIGH (ref 70–99)
GLUCOSE SERPL-MCNC: 284 MG/DL — HIGH (ref 70–99)
GLUCOSE SERPL-MCNC: 405 MG/DL — HIGH (ref 70–99)
GLUCOSE SERPL-MCNC: 461 MG/DL — CRITICAL HIGH (ref 70–99)
GLUCOSE UR QL: 1000 MG/DL
GRAM STN FLD: SIGNIFICANT CHANGE UP
HCO3 BLDA-SCNC: 14 MMOL/L — LOW (ref 21–28)
HCO3 BLDA-SCNC: 16 MMOL/L — LOW (ref 21–28)
HCO3 BLDA-SCNC: 20 MMOL/L — LOW (ref 21–28)
HCO3 BLDA-SCNC: 22 MMOL/L — SIGNIFICANT CHANGE UP (ref 21–28)
HCO3 BLDA-SCNC: 27 MMOL/L — SIGNIFICANT CHANGE UP (ref 21–28)
HCO3 BLDA-SCNC: 33 MMOL/L — HIGH (ref 21–28)
HCT VFR BLD CALC: 34.1 % — LOW (ref 39–50)
HCT VFR BLD CALC: 36.1 % — LOW (ref 39–50)
HCT VFR BLD CALC: 37.2 % — LOW (ref 39–50)
HCT VFR BLD CALC: 37.3 % — LOW (ref 39–50)
HCT VFR BLD CALC: 37.9 % — LOW (ref 39–50)
HCT VFR BLD CALC: 38.2 % — LOW (ref 39–50)
HCT VFR BLD CALC: 38.4 % — LOW (ref 39–50)
HCT VFR BLD CALC: 38.7 % — LOW (ref 39–50)
HCT VFR BLD CALC: 40.7 % — SIGNIFICANT CHANGE UP (ref 39–50)
HCT VFR BLD CALC: 41.1 % — SIGNIFICANT CHANGE UP (ref 39–50)
HCT VFR BLD CALC: 42 % — SIGNIFICANT CHANGE UP (ref 39–50)
HCV AB S/CO SERPL IA: 0.1 S/CO — SIGNIFICANT CHANGE UP (ref 0–0.99)
HCV AB SERPL-IMP: SIGNIFICANT CHANGE UP
HGB BLD-MCNC: 10 G/DL — LOW (ref 13–17)
HGB BLD-MCNC: 11.1 G/DL — LOW (ref 13–17)
HGB BLD-MCNC: 11.2 G/DL — LOW (ref 13–17)
HGB BLD-MCNC: 11.3 G/DL — LOW (ref 13–17)
HGB BLD-MCNC: 11.3 G/DL — LOW (ref 13–17)
HGB BLD-MCNC: 11.5 G/DL — LOW (ref 13–17)
HGB BLD-MCNC: 11.5 G/DL — LOW (ref 13–17)
HGB BLD-MCNC: 11.6 G/DL — LOW (ref 13–17)
HGB BLD-MCNC: 11.9 G/DL — LOW (ref 13–17)
HGB BLD-MCNC: 12.4 G/DL — LOW (ref 13–17)
HGB BLD-MCNC: 12.5 G/DL — LOW (ref 13–17)
HOROWITZ INDEX BLDA+IHG-RTO: 100 — SIGNIFICANT CHANGE UP
HOROWITZ INDEX BLDA+IHG-RTO: 40 — SIGNIFICANT CHANGE UP
IMM GRANULOCYTES NFR BLD AUTO: 0.6 % — SIGNIFICANT CHANGE UP (ref 0–1.5)
IMM GRANULOCYTES NFR BLD AUTO: 0.7 % — SIGNIFICANT CHANGE UP (ref 0–1.5)
IMM GRANULOCYTES NFR BLD AUTO: 0.8 % — SIGNIFICANT CHANGE UP (ref 0–1.5)
IMM GRANULOCYTES NFR BLD AUTO: 0.9 % — SIGNIFICANT CHANGE UP (ref 0–1.5)
IMM GRANULOCYTES NFR BLD AUTO: 1.1 % — SIGNIFICANT CHANGE UP (ref 0–1.5)
IMM GRANULOCYTES NFR BLD AUTO: 1.1 % — SIGNIFICANT CHANGE UP (ref 0–1.5)
IMM GRANULOCYTES NFR BLD AUTO: 1.5 % — SIGNIFICANT CHANGE UP (ref 0–1.5)
IMM GRANULOCYTES NFR BLD AUTO: 1.9 % — HIGH (ref 0–1.5)
INR BLD: 1.12 RATIO — SIGNIFICANT CHANGE UP (ref 0.88–1.16)
INR BLD: 1.19 RATIO — HIGH (ref 0.88–1.16)
INR BLD: 1.26 RATIO — HIGH (ref 0.88–1.16)
INR BLD: 1.58 RATIO — HIGH (ref 0.88–1.16)
INR BLD: 1.78 RATIO — HIGH (ref 0.88–1.16)
INR BLD: 2.05 RATIO — HIGH (ref 0.88–1.16)
KETONES UR-MCNC: NEGATIVE — SIGNIFICANT CHANGE UP
LACTATE SERPL-SCNC: 1.7 MMOL/L — SIGNIFICANT CHANGE UP (ref 0.7–2)
LACTATE SERPL-SCNC: 1.9 MMOL/L — SIGNIFICANT CHANGE UP (ref 0.7–2)
LACTATE SERPL-SCNC: 13.4 MMOL/L — CRITICAL HIGH (ref 0.7–2)
LACTATE SERPL-SCNC: 2.1 MMOL/L — HIGH (ref 0.7–2)
LACTATE SERPL-SCNC: 2.3 MMOL/L — HIGH (ref 0.7–2)
LACTATE SERPL-SCNC: 2.7 MMOL/L — HIGH (ref 0.7–2)
LACTATE SERPL-SCNC: 3.1 MMOL/L — HIGH (ref 0.7–2)
LACTATE SERPL-SCNC: 3.2 MMOL/L — HIGH (ref 0.7–2)
LACTATE SERPL-SCNC: 3.5 MMOL/L — HIGH (ref 0.7–2)
LACTATE SERPL-SCNC: 3.6 MMOL/L — HIGH (ref 0.7–2)
LACTATE SERPL-SCNC: 3.9 MMOL/L — HIGH (ref 0.7–2)
LACTATE SERPL-SCNC: 4.1 MMOL/L — CRITICAL HIGH (ref 0.7–2)
LACTATE SERPL-SCNC: 4.6 MMOL/L — CRITICAL HIGH (ref 0.7–2)
LACTATE SERPL-SCNC: 5 MMOL/L — CRITICAL HIGH (ref 0.7–2)
LACTATE SERPL-SCNC: 5 MMOL/L — CRITICAL HIGH (ref 0.7–2)
LACTATE SERPL-SCNC: 5.1 MMOL/L — CRITICAL HIGH (ref 0.7–2)
LACTATE SERPL-SCNC: 6 MMOL/L — CRITICAL HIGH (ref 0.7–2)
LACTATE SERPL-SCNC: 9.7 MMOL/L — CRITICAL HIGH (ref 0.7–2)
LEUKOCYTE ESTERASE UR-ACNC: NEGATIVE — SIGNIFICANT CHANGE UP
LYMPHOCYTES # BLD AUTO: 0.85 K/UL — LOW (ref 1–3.3)
LYMPHOCYTES # BLD AUTO: 1.05 K/UL — SIGNIFICANT CHANGE UP (ref 1–3.3)
LYMPHOCYTES # BLD AUTO: 1.05 K/UL — SIGNIFICANT CHANGE UP (ref 1–3.3)
LYMPHOCYTES # BLD AUTO: 1.06 K/UL — SIGNIFICANT CHANGE UP (ref 1–3.3)
LYMPHOCYTES # BLD AUTO: 1.15 K/UL — SIGNIFICANT CHANGE UP (ref 1–3.3)
LYMPHOCYTES # BLD AUTO: 1.25 K/UL — SIGNIFICANT CHANGE UP (ref 1–3.3)
LYMPHOCYTES # BLD AUTO: 1.28 K/UL — SIGNIFICANT CHANGE UP (ref 1–3.3)
LYMPHOCYTES # BLD AUTO: 1.33 K/UL — SIGNIFICANT CHANGE UP (ref 1–3.3)
LYMPHOCYTES # BLD AUTO: 11.4 % — LOW (ref 13–44)
LYMPHOCYTES # BLD AUTO: 12.4 % — LOW (ref 13–44)
LYMPHOCYTES # BLD AUTO: 12.6 % — LOW (ref 13–44)
LYMPHOCYTES # BLD AUTO: 14 % — SIGNIFICANT CHANGE UP (ref 13–44)
LYMPHOCYTES # BLD AUTO: 2.33 K/UL — SIGNIFICANT CHANGE UP (ref 1–3.3)
LYMPHOCYTES # BLD AUTO: 5.2 % — LOW (ref 13–44)
LYMPHOCYTES # BLD AUTO: 7.7 % — LOW (ref 13–44)
LYMPHOCYTES # BLD AUTO: 8 % — LOW (ref 13–44)
LYMPHOCYTES # BLD AUTO: 8 % — LOW (ref 13–44)
LYMPHOCYTES # BLD AUTO: 8.6 % — LOW (ref 13–44)
MAGNESIUM SERPL-MCNC: 2.4 MG/DL — SIGNIFICANT CHANGE UP (ref 1.6–2.6)
MAGNESIUM SERPL-MCNC: 2.5 MG/DL — SIGNIFICANT CHANGE UP (ref 1.6–2.6)
MAGNESIUM SERPL-MCNC: 2.5 MG/DL — SIGNIFICANT CHANGE UP (ref 1.6–2.6)
MAGNESIUM SERPL-MCNC: 2.6 MG/DL — SIGNIFICANT CHANGE UP (ref 1.6–2.6)
MAGNESIUM SERPL-MCNC: 2.7 MG/DL — HIGH (ref 1.6–2.6)
MAGNESIUM SERPL-MCNC: 2.9 MG/DL — HIGH (ref 1.6–2.6)
MAGNESIUM SERPL-MCNC: 3 MG/DL — HIGH (ref 1.6–2.6)
MCHC RBC-ENTMCNC: 18.4 PG — LOW (ref 27–34)
MCHC RBC-ENTMCNC: 18.4 PG — LOW (ref 27–34)
MCHC RBC-ENTMCNC: 18.6 PG — LOW (ref 27–34)
MCHC RBC-ENTMCNC: 18.6 PG — LOW (ref 27–34)
MCHC RBC-ENTMCNC: 18.7 PG — LOW (ref 27–34)
MCHC RBC-ENTMCNC: 18.9 PG — LOW (ref 27–34)
MCHC RBC-ENTMCNC: 18.9 PG — LOW (ref 27–34)
MCHC RBC-ENTMCNC: 19 PG — LOW (ref 27–34)
MCHC RBC-ENTMCNC: 19.1 PG — LOW (ref 27–34)
MCHC RBC-ENTMCNC: 19.2 PG — LOW (ref 27–34)
MCHC RBC-ENTMCNC: 19.3 PG — LOW (ref 27–34)
MCHC RBC-ENTMCNC: 29 GM/DL — LOW (ref 32–36)
MCHC RBC-ENTMCNC: 29.3 GM/DL — LOW (ref 32–36)
MCHC RBC-ENTMCNC: 29.4 GM/DL — LOW (ref 32–36)
MCHC RBC-ENTMCNC: 29.6 GM/DL — LOW (ref 32–36)
MCHC RBC-ENTMCNC: 29.7 GM/DL — LOW (ref 32–36)
MCHC RBC-ENTMCNC: 29.8 GM/DL — LOW (ref 32–36)
MCHC RBC-ENTMCNC: 30.1 GM/DL — LOW (ref 32–36)
MCHC RBC-ENTMCNC: 30.3 GM/DL — LOW (ref 32–36)
MCHC RBC-ENTMCNC: 30.5 GM/DL — LOW (ref 32–36)
MCHC RBC-ENTMCNC: 30.7 GM/DL — LOW (ref 32–36)
MCHC RBC-ENTMCNC: 31.1 GM/DL — LOW (ref 32–36)
MCV RBC AUTO: 62 FL — LOW (ref 80–100)
MCV RBC AUTO: 62.3 FL — LOW (ref 80–100)
MCV RBC AUTO: 62.4 FL — LOW (ref 80–100)
MCV RBC AUTO: 62.4 FL — LOW (ref 80–100)
MCV RBC AUTO: 62.5 FL — LOW (ref 80–100)
MCV RBC AUTO: 62.7 FL — LOW (ref 80–100)
MCV RBC AUTO: 62.9 FL — LOW (ref 80–100)
MCV RBC AUTO: 63 FL — LOW (ref 80–100)
MCV RBC AUTO: 63.7 FL — LOW (ref 80–100)
MCV RBC AUTO: 63.7 FL — LOW (ref 80–100)
MCV RBC AUTO: 63.8 FL — LOW (ref 80–100)
MONOCYTES # BLD AUTO: 0.6 K/UL — SIGNIFICANT CHANGE UP (ref 0–0.9)
MONOCYTES # BLD AUTO: 0.8 K/UL — SIGNIFICANT CHANGE UP (ref 0–0.9)
MONOCYTES # BLD AUTO: 0.82 K/UL — SIGNIFICANT CHANGE UP (ref 0–0.9)
MONOCYTES # BLD AUTO: 0.83 K/UL — SIGNIFICANT CHANGE UP (ref 0–0.9)
MONOCYTES # BLD AUTO: 0.92 K/UL — HIGH (ref 0–0.9)
MONOCYTES # BLD AUTO: 1 K/UL — HIGH (ref 0–0.9)
MONOCYTES # BLD AUTO: 1.11 K/UL — HIGH (ref 0–0.9)
MONOCYTES # BLD AUTO: 1.28 K/UL — HIGH (ref 0–0.9)
MONOCYTES # BLD AUTO: 1.33 K/UL — HIGH (ref 0–0.9)
MONOCYTES NFR BLD AUTO: 5.6 % — SIGNIFICANT CHANGE UP (ref 2–14)
MONOCYTES NFR BLD AUTO: 5.9 % — SIGNIFICANT CHANGE UP (ref 2–14)
MONOCYTES NFR BLD AUTO: 6 % — SIGNIFICANT CHANGE UP (ref 2–14)
MONOCYTES NFR BLD AUTO: 6.4 % — SIGNIFICANT CHANGE UP (ref 2–14)
MONOCYTES NFR BLD AUTO: 7.5 % — SIGNIFICANT CHANGE UP (ref 2–14)
MONOCYTES NFR BLD AUTO: 8.1 % — SIGNIFICANT CHANGE UP (ref 2–14)
MONOCYTES NFR BLD AUTO: 8.5 % — SIGNIFICANT CHANGE UP (ref 2–14)
MONOCYTES NFR BLD AUTO: 9 % — SIGNIFICANT CHANGE UP (ref 2–14)
MONOCYTES NFR BLD AUTO: 9.3 % — SIGNIFICANT CHANGE UP (ref 2–14)
MRSA PCR RESULT.: SIGNIFICANT CHANGE UP
MRSA PCR RESULT.: SIGNIFICANT CHANGE UP
NEUTROPHILS # BLD AUTO: 10.72 K/UL — HIGH (ref 1.8–7.4)
NEUTROPHILS # BLD AUTO: 11.31 K/UL — HIGH (ref 1.8–7.4)
NEUTROPHILS # BLD AUTO: 11.94 K/UL — HIGH (ref 1.8–7.4)
NEUTROPHILS # BLD AUTO: 12.19 K/UL — HIGH (ref 1.8–7.4)
NEUTROPHILS # BLD AUTO: 13.14 K/UL — HIGH (ref 1.8–7.4)
NEUTROPHILS # BLD AUTO: 13.78 K/UL — HIGH (ref 1.8–7.4)
NEUTROPHILS # BLD AUTO: 7.17 K/UL — SIGNIFICANT CHANGE UP (ref 1.8–7.4)
NEUTROPHILS # BLD AUTO: 8.14 K/UL — HIGH (ref 1.8–7.4)
NEUTROPHILS # BLD AUTO: 8.43 K/UL — HIGH (ref 1.8–7.4)
NEUTROPHILS NFR BLD AUTO: 75 % — SIGNIFICANT CHANGE UP (ref 43–77)
NEUTROPHILS NFR BLD AUTO: 77.5 % — HIGH (ref 43–77)
NEUTROPHILS NFR BLD AUTO: 78.5 % — HIGH (ref 43–77)
NEUTROPHILS NFR BLD AUTO: 79.9 % — HIGH (ref 43–77)
NEUTROPHILS NFR BLD AUTO: 81.7 % — HIGH (ref 43–77)
NEUTROPHILS NFR BLD AUTO: 82.1 % — HIGH (ref 43–77)
NEUTROPHILS NFR BLD AUTO: 83.2 % — HIGH (ref 43–77)
NEUTROPHILS NFR BLD AUTO: 83.4 % — HIGH (ref 43–77)
NEUTROPHILS NFR BLD AUTO: 84 % — HIGH (ref 43–77)
NITRITE UR-MCNC: NEGATIVE — SIGNIFICANT CHANGE UP
NRBC # BLD: 0 /100 WBCS — SIGNIFICANT CHANGE UP (ref 0–0)
NRBC # BLD: 1 /100 WBCS — HIGH (ref 0–0)
NRBC # BLD: 1 /100 WBCS — HIGH (ref 0–0)
NRBC # BLD: 2 /100 WBCS — HIGH (ref 0–0)
NRBC # BLD: SIGNIFICANT CHANGE UP /100 WBCS (ref 0–0)
NT-PROBNP SERPL-SCNC: 4283 PG/ML — HIGH (ref 0–450)
PCO2 BLDA: 30 MMHG — LOW (ref 35–48)
PCO2 BLDA: 30 MMHG — LOW (ref 35–48)
PCO2 BLDA: 33 MMHG — LOW (ref 35–48)
PCO2 BLDA: 36 MMHG — SIGNIFICANT CHANGE UP (ref 35–48)
PCO2 BLDA: 38 MMHG — SIGNIFICANT CHANGE UP (ref 35–48)
PCO2 BLDA: 43 MMHG — SIGNIFICANT CHANGE UP (ref 35–48)
PH BLDA: 7.25 — LOW (ref 7.35–7.45)
PH BLDA: 7.33 — LOW (ref 7.35–7.45)
PH BLDA: 7.35 — SIGNIFICANT CHANGE UP (ref 7.35–7.45)
PH BLDA: 7.46 — HIGH (ref 7.35–7.45)
PH BLDA: 7.48 — HIGH (ref 7.35–7.45)
PH BLDA: 7.51 — HIGH (ref 7.35–7.45)
PH UR: 5 — SIGNIFICANT CHANGE UP (ref 5–8)
PHOSPHATE SERPL-MCNC: 1.5 MG/DL — LOW (ref 2.5–4.5)
PHOSPHATE SERPL-MCNC: 2.1 MG/DL — LOW (ref 2.5–4.5)
PHOSPHATE SERPL-MCNC: 2.4 MG/DL — LOW (ref 2.5–4.5)
PHOSPHATE SERPL-MCNC: 2.6 MG/DL — SIGNIFICANT CHANGE UP (ref 2.5–4.5)
PHOSPHATE SERPL-MCNC: 2.7 MG/DL — SIGNIFICANT CHANGE UP (ref 2.5–4.5)
PHOSPHATE SERPL-MCNC: 2.8 MG/DL — SIGNIFICANT CHANGE UP (ref 2.5–4.5)
PHOSPHATE SERPL-MCNC: 2.8 MG/DL — SIGNIFICANT CHANGE UP (ref 2.5–4.5)
PHOSPHATE SERPL-MCNC: 3.2 MG/DL — SIGNIFICANT CHANGE UP (ref 2.5–4.5)
PHOSPHATE SERPL-MCNC: 4.1 MG/DL — SIGNIFICANT CHANGE UP (ref 2.5–4.5)
PHOSPHATE SERPL-MCNC: 4.7 MG/DL — HIGH (ref 2.5–4.5)
PHOSPHATE SERPL-MCNC: 4.9 MG/DL — HIGH (ref 2.5–4.5)
PHOSPHATE SERPL-MCNC: 5.6 MG/DL — HIGH (ref 2.5–4.5)
PLATELET # BLD AUTO: 151 K/UL — SIGNIFICANT CHANGE UP (ref 150–400)
PLATELET # BLD AUTO: 174 K/UL — SIGNIFICANT CHANGE UP (ref 150–400)
PLATELET # BLD AUTO: 175 K/UL — SIGNIFICANT CHANGE UP (ref 150–400)
PLATELET # BLD AUTO: 183 K/UL — SIGNIFICANT CHANGE UP (ref 150–400)
PLATELET # BLD AUTO: 184 K/UL — SIGNIFICANT CHANGE UP (ref 150–400)
PLATELET # BLD AUTO: 190 K/UL — SIGNIFICANT CHANGE UP (ref 150–400)
PLATELET # BLD AUTO: 195 K/UL — SIGNIFICANT CHANGE UP (ref 150–400)
PLATELET # BLD AUTO: 225 K/UL — SIGNIFICANT CHANGE UP (ref 150–400)
PLATELET # BLD AUTO: 228 K/UL — SIGNIFICANT CHANGE UP (ref 150–400)
PLATELET # BLD AUTO: 228 K/UL — SIGNIFICANT CHANGE UP (ref 150–400)
PLATELET # BLD AUTO: 243 K/UL — SIGNIFICANT CHANGE UP (ref 150–400)
PO2 BLDA: 126 MMHG — HIGH (ref 83–108)
PO2 BLDA: 131 MMHG — HIGH (ref 83–108)
PO2 BLDA: 158 MMHG — HIGH (ref 83–108)
PO2 BLDA: 176 MMHG — HIGH (ref 83–108)
PO2 BLDA: 49 MMHG — CRITICAL LOW (ref 83–108)
PO2 BLDA: 74 MMHG — LOW (ref 83–108)
POTASSIUM SERPL-MCNC: 3.1 MMOL/L — LOW (ref 3.5–5.3)
POTASSIUM SERPL-MCNC: 3.1 MMOL/L — LOW (ref 3.5–5.3)
POTASSIUM SERPL-MCNC: 3.4 MMOL/L — LOW (ref 3.5–5.3)
POTASSIUM SERPL-MCNC: 3.5 MMOL/L — SIGNIFICANT CHANGE UP (ref 3.5–5.3)
POTASSIUM SERPL-MCNC: 3.6 MMOL/L — SIGNIFICANT CHANGE UP (ref 3.5–5.3)
POTASSIUM SERPL-MCNC: 3.9 MMOL/L — SIGNIFICANT CHANGE UP (ref 3.5–5.3)
POTASSIUM SERPL-MCNC: 4 MMOL/L — SIGNIFICANT CHANGE UP (ref 3.5–5.3)
POTASSIUM SERPL-MCNC: 4 MMOL/L — SIGNIFICANT CHANGE UP (ref 3.5–5.3)
POTASSIUM SERPL-MCNC: 4.2 MMOL/L — SIGNIFICANT CHANGE UP (ref 3.5–5.3)
POTASSIUM SERPL-MCNC: 4.2 MMOL/L — SIGNIFICANT CHANGE UP (ref 3.5–5.3)
POTASSIUM SERPL-MCNC: 4.4 MMOL/L — SIGNIFICANT CHANGE UP (ref 3.5–5.3)
POTASSIUM SERPL-MCNC: 4.4 MMOL/L — SIGNIFICANT CHANGE UP (ref 3.5–5.3)
POTASSIUM SERPL-MCNC: 4.5 MMOL/L — SIGNIFICANT CHANGE UP (ref 3.5–5.3)
POTASSIUM SERPL-MCNC: 5.2 MMOL/L — SIGNIFICANT CHANGE UP (ref 3.5–5.3)
POTASSIUM SERPL-SCNC: 3.1 MMOL/L — LOW (ref 3.5–5.3)
POTASSIUM SERPL-SCNC: 3.1 MMOL/L — LOW (ref 3.5–5.3)
POTASSIUM SERPL-SCNC: 3.4 MMOL/L — LOW (ref 3.5–5.3)
POTASSIUM SERPL-SCNC: 3.5 MMOL/L — SIGNIFICANT CHANGE UP (ref 3.5–5.3)
POTASSIUM SERPL-SCNC: 3.6 MMOL/L — SIGNIFICANT CHANGE UP (ref 3.5–5.3)
POTASSIUM SERPL-SCNC: 3.9 MMOL/L — SIGNIFICANT CHANGE UP (ref 3.5–5.3)
POTASSIUM SERPL-SCNC: 4 MMOL/L — SIGNIFICANT CHANGE UP (ref 3.5–5.3)
POTASSIUM SERPL-SCNC: 4 MMOL/L — SIGNIFICANT CHANGE UP (ref 3.5–5.3)
POTASSIUM SERPL-SCNC: 4.2 MMOL/L — SIGNIFICANT CHANGE UP (ref 3.5–5.3)
POTASSIUM SERPL-SCNC: 4.2 MMOL/L — SIGNIFICANT CHANGE UP (ref 3.5–5.3)
POTASSIUM SERPL-SCNC: 4.4 MMOL/L — SIGNIFICANT CHANGE UP (ref 3.5–5.3)
POTASSIUM SERPL-SCNC: 4.4 MMOL/L — SIGNIFICANT CHANGE UP (ref 3.5–5.3)
POTASSIUM SERPL-SCNC: 4.5 MMOL/L — SIGNIFICANT CHANGE UP (ref 3.5–5.3)
POTASSIUM SERPL-SCNC: 5.2 MMOL/L — SIGNIFICANT CHANGE UP (ref 3.5–5.3)
PROCALCITONIN SERPL-MCNC: 0.11 NG/ML — HIGH
PROT SERPL-MCNC: 5.8 G/DL — LOW (ref 6–8.3)
PROT SERPL-MCNC: 6.3 G/DL — SIGNIFICANT CHANGE UP (ref 6–8.3)
PROT SERPL-MCNC: 6.5 G/DL — SIGNIFICANT CHANGE UP (ref 6–8.3)
PROT SERPL-MCNC: 6.6 G/DL — SIGNIFICANT CHANGE UP (ref 6–8.3)
PROT SERPL-MCNC: 6.6 G/DL — SIGNIFICANT CHANGE UP (ref 6–8.3)
PROT SERPL-MCNC: 6.8 G/DL — SIGNIFICANT CHANGE UP (ref 6–8.3)
PROT SERPL-MCNC: 6.9 G/DL — SIGNIFICANT CHANGE UP (ref 6–8.3)
PROT SERPL-MCNC: 6.9 G/DL — SIGNIFICANT CHANGE UP (ref 6–8.3)
PROT SERPL-MCNC: 7.1 G/DL — SIGNIFICANT CHANGE UP (ref 6–8.3)
PROT SERPL-MCNC: 7.2 G/DL — SIGNIFICANT CHANGE UP (ref 6–8.3)
PROT SERPL-MCNC: 7.2 G/DL — SIGNIFICANT CHANGE UP (ref 6–8.3)
PROT SERPL-MCNC: 7.4 G/DL — SIGNIFICANT CHANGE UP (ref 6–8.3)
PROT SERPL-MCNC: 7.5 G/DL — SIGNIFICANT CHANGE UP (ref 6–8.3)
PROT UR-MCNC: NEGATIVE — SIGNIFICANT CHANGE UP
PROTHROM AB SERPL-ACNC: 13.1 SEC — SIGNIFICANT CHANGE UP (ref 10.5–13.4)
PROTHROM AB SERPL-ACNC: 13.9 SEC — HIGH (ref 10.5–13.4)
PROTHROM AB SERPL-ACNC: 14.8 SEC — HIGH (ref 10.5–13.4)
PROTHROM AB SERPL-ACNC: 18.6 SEC — HIGH (ref 10.5–13.4)
PROTHROM AB SERPL-ACNC: 20.9 SEC — HIGH (ref 10.5–13.4)
PROTHROM AB SERPL-ACNC: 24.2 SEC — HIGH (ref 10.5–13.4)
RAPID RVP RESULT: SIGNIFICANT CHANGE UP
RBC # BLD: 5.35 M/UL — SIGNIFICANT CHANGE UP (ref 4.2–5.8)
RBC # BLD: 5.79 M/UL — SIGNIFICANT CHANGE UP (ref 4.2–5.8)
RBC # BLD: 5.93 M/UL — HIGH (ref 4.2–5.8)
RBC # BLD: 6.02 M/UL — HIGH (ref 4.2–5.8)
RBC # BLD: 6.02 M/UL — HIGH (ref 4.2–5.8)
RBC # BLD: 6.07 M/UL — HIGH (ref 4.2–5.8)
RBC # BLD: 6.07 M/UL — HIGH (ref 4.2–5.8)
RBC # BLD: 6.14 M/UL — HIGH (ref 4.2–5.8)
RBC # BLD: 6.45 M/UL — HIGH (ref 4.2–5.8)
RBC # BLD: 6.52 M/UL — HIGH (ref 4.2–5.8)
RBC # BLD: 6.73 M/UL — HIGH (ref 4.2–5.8)
RBC # FLD: 20.8 % — HIGH (ref 10.3–14.5)
RBC # FLD: 21.2 % — HIGH (ref 10.3–14.5)
RBC # FLD: 21.3 % — HIGH (ref 10.3–14.5)
RBC # FLD: 21.4 % — HIGH (ref 10.3–14.5)
RBC # FLD: 21.4 % — HIGH (ref 10.3–14.5)
RBC # FLD: 21.5 % — HIGH (ref 10.3–14.5)
RBC # FLD: 21.6 % — HIGH (ref 10.3–14.5)
RBC # FLD: 21.6 % — HIGH (ref 10.3–14.5)
RBC # FLD: 21.7 % — HIGH (ref 10.3–14.5)
RBC # FLD: 21.8 % — HIGH (ref 10.3–14.5)
RBC # FLD: 22.3 % — HIGH (ref 10.3–14.5)
RSV RNA NPH QL NAA+NON-PROBE: SIGNIFICANT CHANGE UP
S AUREUS DNA NOSE QL NAA+PROBE: SIGNIFICANT CHANGE UP
S AUREUS DNA NOSE QL NAA+PROBE: SIGNIFICANT CHANGE UP
SAO2 % BLDA: 83.1 % — LOW (ref 94–98)
SAO2 % BLDA: 94.7 % — SIGNIFICANT CHANGE UP (ref 94–98)
SAO2 % BLDA: 99.1 % — HIGH (ref 94–98)
SAO2 % BLDA: 99.2 % — HIGH (ref 94–98)
SAO2 % BLDA: 99.3 % — HIGH (ref 94–98)
SAO2 % BLDA: 99.9 % — HIGH (ref 94–98)
SARS-COV-2 RNA SPEC QL NAA+PROBE: SIGNIFICANT CHANGE UP
SODIUM SERPL-SCNC: 135 MMOL/L — SIGNIFICANT CHANGE UP (ref 135–145)
SODIUM SERPL-SCNC: 135 MMOL/L — SIGNIFICANT CHANGE UP (ref 135–145)
SODIUM SERPL-SCNC: 136 MMOL/L — SIGNIFICANT CHANGE UP (ref 135–145)
SODIUM SERPL-SCNC: 141 MMOL/L — SIGNIFICANT CHANGE UP (ref 135–145)
SODIUM SERPL-SCNC: 142 MMOL/L — SIGNIFICANT CHANGE UP (ref 135–145)
SODIUM SERPL-SCNC: 145 MMOL/L — SIGNIFICANT CHANGE UP (ref 135–145)
SODIUM SERPL-SCNC: 147 MMOL/L — HIGH (ref 135–145)
SODIUM SERPL-SCNC: 150 MMOL/L — HIGH (ref 135–145)
SODIUM SERPL-SCNC: 154 MMOL/L — HIGH (ref 135–145)
SODIUM SERPL-SCNC: 156 MMOL/L — HIGH (ref 135–145)
SODIUM SERPL-SCNC: 157 MMOL/L — HIGH (ref 135–145)
SODIUM SERPL-SCNC: 159 MMOL/L — HIGH (ref 135–145)
SP GR SPEC: 1.01 — SIGNIFICANT CHANGE UP (ref 1.01–1.02)
SPECIMEN SOURCE: SIGNIFICANT CHANGE UP
TROPONIN I, HIGH SENSITIVITY RESULT: 1003.4 NG/L — HIGH
TROPONIN I, HIGH SENSITIVITY RESULT: 1071.8 NG/L — HIGH
TROPONIN I, HIGH SENSITIVITY RESULT: 1099.4 NG/L — HIGH
TROPONIN I, HIGH SENSITIVITY RESULT: 1360.8 NG/L — HIGH
TROPONIN I, HIGH SENSITIVITY RESULT: 1573 NG/L — HIGH
TROPONIN I, HIGH SENSITIVITY RESULT: 1740.6 NG/L — HIGH
TROPONIN I, HIGH SENSITIVITY RESULT: 641.8 NG/L — HIGH
TROPONIN I, HIGH SENSITIVITY RESULT: 891.3 NG/L — HIGH
UROBILINOGEN FLD QL: NEGATIVE — SIGNIFICANT CHANGE UP
WBC # BLD: 10.18 K/UL — SIGNIFICANT CHANGE UP (ref 3.8–10.5)
WBC # BLD: 10.72 K/UL — HIGH (ref 3.8–10.5)
WBC # BLD: 10.96 K/UL — HIGH (ref 3.8–10.5)
WBC # BLD: 13.06 K/UL — HIGH (ref 3.8–10.5)
WBC # BLD: 13.82 K/UL — HIGH (ref 3.8–10.5)
WBC # BLD: 14.35 K/UL — HIGH (ref 3.8–10.5)
WBC # BLD: 14.52 K/UL — HIGH (ref 3.8–10.5)
WBC # BLD: 14.88 K/UL — HIGH (ref 3.8–10.5)
WBC # BLD: 16.5 K/UL — HIGH (ref 3.8–10.5)
WBC # BLD: 16.63 K/UL — HIGH (ref 3.8–10.5)
WBC # BLD: 9.24 K/UL — SIGNIFICANT CHANGE UP (ref 3.8–10.5)
WBC # FLD AUTO: 10.18 K/UL — SIGNIFICANT CHANGE UP (ref 3.8–10.5)
WBC # FLD AUTO: 10.72 K/UL — HIGH (ref 3.8–10.5)
WBC # FLD AUTO: 10.96 K/UL — HIGH (ref 3.8–10.5)
WBC # FLD AUTO: 13.06 K/UL — HIGH (ref 3.8–10.5)
WBC # FLD AUTO: 13.82 K/UL — HIGH (ref 3.8–10.5)
WBC # FLD AUTO: 14.35 K/UL — HIGH (ref 3.8–10.5)
WBC # FLD AUTO: 14.52 K/UL — HIGH (ref 3.8–10.5)
WBC # FLD AUTO: 14.88 K/UL — HIGH (ref 3.8–10.5)
WBC # FLD AUTO: 16.5 K/UL — HIGH (ref 3.8–10.5)
WBC # FLD AUTO: 16.63 K/UL — HIGH (ref 3.8–10.5)
WBC # FLD AUTO: 9.24 K/UL — SIGNIFICANT CHANGE UP (ref 3.8–10.5)

## 2022-01-01 PROCEDURE — 85025 COMPLETE CBC W/AUTO DIFF WBC: CPT

## 2022-01-01 PROCEDURE — 94660 CPAP INITIATION&MGMT: CPT

## 2022-01-01 PROCEDURE — 87637 SARSCOV2&INF A&B&RSV AMP PRB: CPT

## 2022-01-01 PROCEDURE — 71045 X-RAY EXAM CHEST 1 VIEW: CPT | Mod: 26

## 2022-01-01 PROCEDURE — 85730 THROMBOPLASTIN TIME PARTIAL: CPT

## 2022-01-01 PROCEDURE — 99231 SBSQ HOSP IP/OBS SF/LOW 25: CPT

## 2022-01-01 PROCEDURE — 87640 STAPH A DNA AMP PROBE: CPT

## 2022-01-01 PROCEDURE — 94799 UNLISTED PULMONARY SVC/PX: CPT

## 2022-01-01 PROCEDURE — 71045 X-RAY EXAM CHEST 1 VIEW: CPT | Mod: 26,77,76

## 2022-01-01 PROCEDURE — 71045 X-RAY EXAM CHEST 1 VIEW: CPT

## 2022-01-01 PROCEDURE — 84145 PROCALCITONIN (PCT): CPT

## 2022-01-01 PROCEDURE — 99221 1ST HOSP IP/OBS SF/LOW 40: CPT

## 2022-01-01 PROCEDURE — 81003 URINALYSIS AUTO W/O SCOPE: CPT

## 2022-01-01 PROCEDURE — 36600 WITHDRAWAL OF ARTERIAL BLOOD: CPT

## 2022-01-01 PROCEDURE — 93010 ELECTROCARDIOGRAM REPORT: CPT

## 2022-01-01 PROCEDURE — 87077 CULTURE AEROBIC IDENTIFY: CPT

## 2022-01-01 PROCEDURE — 80048 BASIC METABOLIC PNL TOTAL CA: CPT

## 2022-01-01 PROCEDURE — 36415 COLL VENOUS BLD VENIPUNCTURE: CPT

## 2022-01-01 PROCEDURE — 93005 ELECTROCARDIOGRAM TRACING: CPT

## 2022-01-01 PROCEDURE — 85027 COMPLETE CBC AUTOMATED: CPT

## 2022-01-01 PROCEDURE — 99292 CRITICAL CARE ADDL 30 MIN: CPT

## 2022-01-01 PROCEDURE — 94002 VENT MGMT INPAT INIT DAY: CPT

## 2022-01-01 PROCEDURE — 96374 THER/PROPH/DIAG INJ IV PUSH: CPT

## 2022-01-01 PROCEDURE — 83880 ASSAY OF NATRIURETIC PEPTIDE: CPT

## 2022-01-01 PROCEDURE — 99291 CRITICAL CARE FIRST HOUR: CPT

## 2022-01-01 PROCEDURE — 87040 BLOOD CULTURE FOR BACTERIA: CPT

## 2022-01-01 PROCEDURE — 71250 CT THORAX DX C-: CPT | Mod: MA

## 2022-01-01 PROCEDURE — 83735 ASSAY OF MAGNESIUM: CPT

## 2022-01-01 PROCEDURE — 82553 CREATINE MB FRACTION: CPT

## 2022-01-01 PROCEDURE — 99233 SBSQ HOSP IP/OBS HIGH 50: CPT

## 2022-01-01 PROCEDURE — 87086 URINE CULTURE/COLONY COUNT: CPT

## 2022-01-01 PROCEDURE — 99223 1ST HOSP IP/OBS HIGH 75: CPT

## 2022-01-01 PROCEDURE — 85610 PROTHROMBIN TIME: CPT

## 2022-01-01 PROCEDURE — 83605 ASSAY OF LACTIC ACID: CPT

## 2022-01-01 PROCEDURE — 0225U NFCT DS DNA&RNA 21 SARSCOV2: CPT

## 2022-01-01 PROCEDURE — 71250 CT THORAX DX C-: CPT | Mod: 26,MA

## 2022-01-01 PROCEDURE — 82550 ASSAY OF CK (CPK): CPT

## 2022-01-01 PROCEDURE — 83036 HEMOGLOBIN GLYCOSYLATED A1C: CPT

## 2022-01-01 PROCEDURE — 87635 SARS-COV-2 COVID-19 AMP PRB: CPT

## 2022-01-01 PROCEDURE — 84484 ASSAY OF TROPONIN QUANT: CPT

## 2022-01-01 PROCEDURE — 87641 MR-STAPH DNA AMP PROBE: CPT

## 2022-01-01 PROCEDURE — 84100 ASSAY OF PHOSPHORUS: CPT

## 2022-01-01 PROCEDURE — 93306 TTE W/DOPPLER COMPLETE: CPT

## 2022-01-01 PROCEDURE — 94760 N-INVAS EAR/PLS OXIMETRY 1: CPT

## 2022-01-01 PROCEDURE — 82803 BLOOD GASES ANY COMBINATION: CPT

## 2022-01-01 PROCEDURE — 80053 COMPREHEN METABOLIC PANEL: CPT

## 2022-01-01 PROCEDURE — 94003 VENT MGMT INPAT SUBQ DAY: CPT

## 2022-01-01 PROCEDURE — 99232 SBSQ HOSP IP/OBS MODERATE 35: CPT

## 2022-01-01 PROCEDURE — 87070 CULTURE OTHR SPECIMN AEROBIC: CPT

## 2022-01-01 PROCEDURE — 82962 GLUCOSE BLOOD TEST: CPT

## 2022-01-01 PROCEDURE — 99291 CRITICAL CARE FIRST HOUR: CPT | Mod: 25

## 2022-01-01 PROCEDURE — 86803 HEPATITIS C AB TEST: CPT

## 2022-01-01 RX ORDER — HEPARIN SODIUM 5000 [USP'U]/ML
850 INJECTION INTRAVENOUS; SUBCUTANEOUS
Qty: 25000 | Refills: 0 | Status: DISCONTINUED | OUTPATIENT
Start: 2022-01-01 | End: 2022-01-01

## 2022-01-01 RX ORDER — DEXTROSE 50 % IN WATER 50 %
25 SYRINGE (ML) INTRAVENOUS ONCE
Refills: 0 | Status: DISCONTINUED | OUTPATIENT
Start: 2022-01-01 | End: 2022-01-01

## 2022-01-01 RX ORDER — ONDANSETRON 8 MG/1
8 TABLET, FILM COATED ORAL ONCE
Refills: 0 | Status: COMPLETED | OUTPATIENT
Start: 2022-01-01 | End: 2022-01-01

## 2022-01-01 RX ORDER — DIGOXIN 250 MCG
1 TABLET ORAL
Qty: 0 | Refills: 0 | DISCHARGE

## 2022-01-01 RX ORDER — INSULIN ASPART 100 [IU]/ML
0 INJECTION, SOLUTION SUBCUTANEOUS
Qty: 0 | Refills: 0 | DISCHARGE

## 2022-01-01 RX ORDER — HEPARIN SODIUM 5000 [USP'U]/ML
4700 INJECTION INTRAVENOUS; SUBCUTANEOUS EVERY 6 HOURS
Refills: 0 | Status: DISCONTINUED | OUTPATIENT
Start: 2022-01-01 | End: 2022-01-01

## 2022-01-01 RX ORDER — ACETAMINOPHEN 500 MG
650 TABLET ORAL EVERY 6 HOURS
Refills: 0 | Status: DISCONTINUED | OUTPATIENT
Start: 2022-01-01 | End: 2022-01-01

## 2022-01-01 RX ORDER — PROPOFOL 10 MG/ML
10 INJECTION, EMULSION INTRAVENOUS
Qty: 1000 | Refills: 0 | Status: DISCONTINUED | OUTPATIENT
Start: 2022-01-01 | End: 2022-01-01

## 2022-01-01 RX ORDER — HEPARIN SODIUM 5000 [USP'U]/ML
3000 INJECTION INTRAVENOUS; SUBCUTANEOUS EVERY 6 HOURS
Refills: 0 | Status: DISCONTINUED | OUTPATIENT
Start: 2022-01-01 | End: 2022-01-01

## 2022-01-01 RX ORDER — NITROGLYCERIN 6.5 MG
0.5 CAPSULE, EXTENDED RELEASE ORAL ONCE
Refills: 0 | Status: COMPLETED | OUTPATIENT
Start: 2022-01-01 | End: 2022-01-01

## 2022-01-01 RX ORDER — HYDROMORPHONE HYDROCHLORIDE 2 MG/ML
2 INJECTION INTRAMUSCULAR; INTRAVENOUS; SUBCUTANEOUS ONCE
Refills: 0 | Status: DISCONTINUED | OUTPATIENT
Start: 2022-01-01 | End: 2022-01-01

## 2022-01-01 RX ORDER — NOREPINEPHRINE BITARTRATE/D5W 8 MG/250ML
0.05 PLASTIC BAG, INJECTION (ML) INTRAVENOUS
Qty: 8 | Refills: 0 | Status: DISCONTINUED | OUTPATIENT
Start: 2022-01-01 | End: 2022-01-01

## 2022-01-01 RX ORDER — BUMETANIDE 0.25 MG/ML
2 INJECTION INTRAMUSCULAR; INTRAVENOUS
Refills: 0 | Status: DISCONTINUED | OUTPATIENT
Start: 2022-01-01 | End: 2022-01-01

## 2022-01-01 RX ORDER — HEPARIN SODIUM 5000 [USP'U]/ML
4700 INJECTION INTRAVENOUS; SUBCUTANEOUS ONCE
Refills: 0 | Status: COMPLETED | OUTPATIENT
Start: 2022-01-01 | End: 2022-01-01

## 2022-01-01 RX ORDER — GLUCAGON INJECTION, SOLUTION 0.5 MG/.1ML
1 INJECTION, SOLUTION SUBCUTANEOUS ONCE
Refills: 0 | Status: DISCONTINUED | OUTPATIENT
Start: 2022-01-01 | End: 2022-01-01

## 2022-01-01 RX ORDER — INSULIN LISPRO 100/ML
4 VIAL (ML) SUBCUTANEOUS
Refills: 0 | Status: DISCONTINUED | OUTPATIENT
Start: 2022-01-01 | End: 2022-01-01

## 2022-01-01 RX ORDER — PANTOPRAZOLE SODIUM 20 MG/1
1 TABLET, DELAYED RELEASE ORAL
Qty: 0 | Refills: 0 | DISCHARGE

## 2022-01-01 RX ORDER — CEFTRIAXONE 500 MG/1
INJECTION, POWDER, FOR SOLUTION INTRAMUSCULAR; INTRAVENOUS
Refills: 0 | Status: DISCONTINUED | OUTPATIENT
Start: 2022-01-01 | End: 2022-01-01

## 2022-01-01 RX ORDER — CEFTRIAXONE 500 MG/1
1000 INJECTION, POWDER, FOR SOLUTION INTRAMUSCULAR; INTRAVENOUS ONCE
Refills: 0 | Status: COMPLETED | OUTPATIENT
Start: 2022-01-01 | End: 2022-01-01

## 2022-01-01 RX ORDER — BUMETANIDE 0.25 MG/ML
2 INJECTION INTRAMUSCULAR; INTRAVENOUS ONCE
Refills: 0 | Status: COMPLETED | OUTPATIENT
Start: 2022-01-01 | End: 2022-01-01

## 2022-01-01 RX ORDER — DEXMEDETOMIDINE HYDROCHLORIDE IN 0.9% SODIUM CHLORIDE 4 UG/ML
0.2 INJECTION INTRAVENOUS
Qty: 400 | Refills: 0 | Status: DISCONTINUED | OUTPATIENT
Start: 2022-01-01 | End: 2022-01-01

## 2022-01-01 RX ORDER — HEPARIN SODIUM 5000 [USP'U]/ML
INJECTION INTRAVENOUS; SUBCUTANEOUS
Qty: 25000 | Refills: 0 | Status: DISCONTINUED | OUTPATIENT
Start: 2022-01-01 | End: 2022-01-01

## 2022-01-01 RX ORDER — SERTRALINE 25 MG/1
1 TABLET, FILM COATED ORAL
Qty: 0 | Refills: 0 | DISCHARGE

## 2022-01-01 RX ORDER — DEXTROSE 50 % IN WATER 50 %
15 SYRINGE (ML) INTRAVENOUS ONCE
Refills: 0 | Status: DISCONTINUED | OUTPATIENT
Start: 2022-01-01 | End: 2022-01-01

## 2022-01-01 RX ORDER — PIPERACILLIN AND TAZOBACTAM 4; .5 G/20ML; G/20ML
3.38 INJECTION, POWDER, LYOPHILIZED, FOR SOLUTION INTRAVENOUS ONCE
Refills: 0 | Status: COMPLETED | OUTPATIENT
Start: 2022-01-01 | End: 2022-01-01

## 2022-01-01 RX ORDER — SODIUM CHLORIDE 9 MG/ML
1000 INJECTION, SOLUTION INTRAVENOUS
Refills: 0 | Status: DISCONTINUED | OUTPATIENT
Start: 2022-01-01 | End: 2022-01-01

## 2022-01-01 RX ORDER — POTASSIUM PHOSPHATE, MONOBASIC POTASSIUM PHOSPHATE, DIBASIC 236; 224 MG/ML; MG/ML
15 INJECTION, SOLUTION INTRAVENOUS ONCE
Refills: 0 | Status: COMPLETED | OUTPATIENT
Start: 2022-01-01 | End: 2022-01-01

## 2022-01-01 RX ORDER — IBUPROFEN 200 MG
400 TABLET ORAL ONCE
Refills: 0 | Status: COMPLETED | OUTPATIENT
Start: 2022-01-01 | End: 2022-01-01

## 2022-01-01 RX ORDER — INSULIN GLARGINE 100 [IU]/ML
20 INJECTION, SOLUTION SUBCUTANEOUS ONCE
Refills: 0 | Status: COMPLETED | OUTPATIENT
Start: 2022-01-01 | End: 2022-01-01

## 2022-01-01 RX ORDER — POTASSIUM PHOSPHATE, MONOBASIC POTASSIUM PHOSPHATE, DIBASIC 236; 224 MG/ML; MG/ML
30 INJECTION, SOLUTION INTRAVENOUS ONCE
Refills: 0 | Status: COMPLETED | OUTPATIENT
Start: 2022-01-01 | End: 2022-01-01

## 2022-01-01 RX ORDER — INSULIN GLARGINE 100 [IU]/ML
20 INJECTION, SOLUTION SUBCUTANEOUS
Refills: 0 | Status: DISCONTINUED | OUTPATIENT
Start: 2022-01-01 | End: 2022-01-01

## 2022-01-01 RX ORDER — SODIUM CHLORIDE 9 MG/ML
1850 INJECTION INTRAMUSCULAR; INTRAVENOUS; SUBCUTANEOUS ONCE
Refills: 0 | Status: COMPLETED | OUTPATIENT
Start: 2022-01-01 | End: 2022-01-01

## 2022-01-01 RX ORDER — ASPIRIN/CALCIUM CARB/MAGNESIUM 324 MG
81 TABLET ORAL DAILY
Refills: 0 | Status: DISCONTINUED | OUTPATIENT
Start: 2022-01-01 | End: 2022-01-01

## 2022-01-01 RX ORDER — METFORMIN HYDROCHLORIDE 850 MG/1
2 TABLET ORAL
Qty: 0 | Refills: 0 | DISCHARGE

## 2022-01-01 RX ORDER — TAMSULOSIN HYDROCHLORIDE 0.4 MG/1
1 CAPSULE ORAL
Qty: 0 | Refills: 0 | DISCHARGE

## 2022-01-01 RX ORDER — CHLORHEXIDINE GLUCONATE 213 G/1000ML
15 SOLUTION TOPICAL EVERY 12 HOURS
Refills: 0 | Status: DISCONTINUED | OUTPATIENT
Start: 2022-01-01 | End: 2022-01-01

## 2022-01-01 RX ORDER — INSULIN GLARGINE 100 [IU]/ML
26 INJECTION, SOLUTION SUBCUTANEOUS AT BEDTIME
Refills: 0 | Status: DISCONTINUED | OUTPATIENT
Start: 2022-01-01 | End: 2022-01-01

## 2022-01-01 RX ORDER — FUROSEMIDE 40 MG
20 TABLET ORAL ONCE
Refills: 0 | Status: COMPLETED | OUTPATIENT
Start: 2022-01-01 | End: 2022-01-01

## 2022-01-01 RX ORDER — GABAPENTIN 400 MG/1
1 CAPSULE ORAL
Qty: 0 | Refills: 0 | DISCHARGE

## 2022-01-01 RX ORDER — HEPARIN SODIUM 5000 [USP'U]/ML
5000 INJECTION INTRAVENOUS; SUBCUTANEOUS EVERY 12 HOURS
Refills: 0 | Status: DISCONTINUED | OUTPATIENT
Start: 2022-01-01 | End: 2022-01-01

## 2022-01-01 RX ORDER — METFORMIN HYDROCHLORIDE 850 MG/1
1 TABLET ORAL
Qty: 0 | Refills: 0 | DISCHARGE

## 2022-01-01 RX ORDER — BUMETANIDE 0.25 MG/ML
2 INJECTION INTRAMUSCULAR; INTRAVENOUS DAILY
Refills: 0 | Status: DISCONTINUED | OUTPATIENT
Start: 2022-01-01 | End: 2022-01-01

## 2022-01-01 RX ORDER — DEXTROSE 50 % IN WATER 50 %
12.5 SYRINGE (ML) INTRAVENOUS ONCE
Refills: 0 | Status: DISCONTINUED | OUTPATIENT
Start: 2022-01-01 | End: 2022-01-01

## 2022-01-01 RX ORDER — HYDROMORPHONE HYDROCHLORIDE 2 MG/ML
3 INJECTION INTRAMUSCULAR; INTRAVENOUS; SUBCUTANEOUS ONCE
Refills: 0 | Status: DISCONTINUED | OUTPATIENT
Start: 2022-01-01 | End: 2022-01-01

## 2022-01-01 RX ORDER — ATORVASTATIN CALCIUM 80 MG/1
40 TABLET, FILM COATED ORAL AT BEDTIME
Refills: 0 | Status: DISCONTINUED | OUTPATIENT
Start: 2022-01-01 | End: 2022-01-01

## 2022-01-01 RX ORDER — HEPARIN SODIUM 5000 [USP'U]/ML
4700 INJECTION INTRAVENOUS; SUBCUTANEOUS ONCE
Refills: 0 | Status: DISCONTINUED | OUTPATIENT
Start: 2022-01-01 | End: 2022-01-01

## 2022-01-01 RX ORDER — TICAGRELOR 90 MG/1
1 TABLET ORAL
Qty: 0 | Refills: 0 | DISCHARGE

## 2022-01-01 RX ORDER — TICAGRELOR 90 MG/1
90 TABLET ORAL EVERY 12 HOURS
Refills: 0 | Status: DISCONTINUED | OUTPATIENT
Start: 2022-01-01 | End: 2022-01-01

## 2022-01-01 RX ORDER — FENOFIBRATE,MICRONIZED 130 MG
1 CAPSULE ORAL
Qty: 0 | Refills: 0 | DISCHARGE

## 2022-01-01 RX ORDER — FENTANYL CITRATE 50 UG/ML
0.5 INJECTION INTRAVENOUS
Qty: 2500 | Refills: 0 | Status: DISCONTINUED | OUTPATIENT
Start: 2022-01-01 | End: 2022-01-01

## 2022-01-01 RX ORDER — INSULIN HUMAN 100 [IU]/ML
4 INJECTION, SOLUTION SUBCUTANEOUS ONCE
Refills: 0 | Status: COMPLETED | OUTPATIENT
Start: 2022-01-01 | End: 2022-01-01

## 2022-01-01 RX ORDER — PANTOPRAZOLE SODIUM 20 MG/1
40 TABLET, DELAYED RELEASE ORAL DAILY
Refills: 0 | Status: DISCONTINUED | OUTPATIENT
Start: 2022-01-01 | End: 2022-01-01

## 2022-01-01 RX ORDER — MORPHINE SULFATE 50 MG/1
1 CAPSULE, EXTENDED RELEASE ORAL ONCE
Refills: 0 | Status: DISCONTINUED | OUTPATIENT
Start: 2022-01-01 | End: 2022-01-01

## 2022-01-01 RX ORDER — HEPARIN SODIUM 5000 [USP'U]/ML
1200 INJECTION INTRAVENOUS; SUBCUTANEOUS
Qty: 25000 | Refills: 0 | Status: DISCONTINUED | OUTPATIENT
Start: 2022-01-01 | End: 2022-01-01

## 2022-01-01 RX ORDER — INSULIN LISPRO 100/ML
VIAL (ML) SUBCUTANEOUS EVERY 6 HOURS
Refills: 0 | Status: DISCONTINUED | OUTPATIENT
Start: 2022-01-01 | End: 2022-01-01

## 2022-01-01 RX ORDER — FENTANYL CITRATE 50 UG/ML
100 INJECTION INTRAVENOUS ONCE
Refills: 0 | Status: DISCONTINUED | OUTPATIENT
Start: 2022-01-01 | End: 2022-01-01

## 2022-01-01 RX ORDER — HYDROMORPHONE HYDROCHLORIDE 2 MG/ML
1 INJECTION INTRAMUSCULAR; INTRAVENOUS; SUBCUTANEOUS ONCE
Refills: 0 | Status: DISCONTINUED | OUTPATIENT
Start: 2022-01-01 | End: 2022-01-01

## 2022-01-01 RX ORDER — POTASSIUM CHLORIDE 20 MEQ
10 PACKET (EA) ORAL ONCE
Refills: 0 | Status: COMPLETED | OUTPATIENT
Start: 2022-01-01 | End: 2022-01-01

## 2022-01-01 RX ORDER — HEPARIN SODIUM 5000 [USP'U]/ML
1400 INJECTION INTRAVENOUS; SUBCUTANEOUS
Qty: 25000 | Refills: 0 | Status: DISCONTINUED | OUTPATIENT
Start: 2022-01-01 | End: 2022-01-01

## 2022-01-01 RX ORDER — INSULIN HUMAN 100 [IU]/ML
12 INJECTION, SOLUTION SUBCUTANEOUS
Qty: 100 | Refills: 0 | Status: DISCONTINUED | OUTPATIENT
Start: 2022-01-01 | End: 2022-01-01

## 2022-01-01 RX ORDER — SPIRONOLACTONE 25 MG/1
0.5 TABLET, FILM COATED ORAL
Qty: 0 | Refills: 0 | DISCHARGE

## 2022-01-01 RX ORDER — DOBUTAMINE HCL 250MG/20ML
5 VIAL (ML) INTRAVENOUS
Qty: 500 | Refills: 0 | Status: DISCONTINUED | OUTPATIENT
Start: 2022-01-01 | End: 2022-01-01

## 2022-01-01 RX ORDER — CEFTRIAXONE 500 MG/1
1000 INJECTION, POWDER, FOR SOLUTION INTRAMUSCULAR; INTRAVENOUS EVERY 24 HOURS
Refills: 0 | Status: DISCONTINUED | OUTPATIENT
Start: 2022-01-01 | End: 2022-01-01

## 2022-01-01 RX ORDER — PIPERACILLIN AND TAZOBACTAM 4; .5 G/20ML; G/20ML
3.38 INJECTION, POWDER, LYOPHILIZED, FOR SOLUTION INTRAVENOUS EVERY 8 HOURS
Refills: 0 | Status: DISCONTINUED | OUTPATIENT
Start: 2022-01-01 | End: 2022-01-01

## 2022-01-01 RX ORDER — TICAGRELOR 90 MG/1
60 TABLET ORAL EVERY 12 HOURS
Refills: 0 | Status: DISCONTINUED | OUTPATIENT
Start: 2022-01-01 | End: 2022-01-01

## 2022-01-01 RX ORDER — CHLORHEXIDINE GLUCONATE 213 G/1000ML
1 SOLUTION TOPICAL
Refills: 0 | Status: DISCONTINUED | OUTPATIENT
Start: 2022-01-01 | End: 2022-01-01

## 2022-01-01 RX ORDER — GABAPENTIN 400 MG/1
6 CAPSULE ORAL
Qty: 0 | Refills: 0 | DISCHARGE

## 2022-01-01 RX ORDER — INSULIN HUMAN 100 [IU]/ML
8 INJECTION, SOLUTION SUBCUTANEOUS
Qty: 100 | Refills: 0 | Status: DISCONTINUED | OUTPATIENT
Start: 2022-01-01 | End: 2022-01-01

## 2022-01-01 RX ORDER — BUMETANIDE 0.25 MG/ML
2 INJECTION INTRAMUSCULAR; INTRAVENOUS EVERY 12 HOURS
Refills: 0 | Status: DISCONTINUED | OUTPATIENT
Start: 2022-01-01 | End: 2022-01-01

## 2022-01-01 RX ORDER — INSULIN GLARGINE 100 [IU]/ML
32 INJECTION, SOLUTION SUBCUTANEOUS AT BEDTIME
Refills: 0 | Status: DISCONTINUED | OUTPATIENT
Start: 2022-01-01 | End: 2022-01-01

## 2022-01-01 RX ORDER — CARVEDILOL PHOSPHATE 80 MG/1
1 CAPSULE, EXTENDED RELEASE ORAL
Qty: 0 | Refills: 0 | DISCHARGE

## 2022-01-01 RX ORDER — SACUBITRIL AND VALSARTAN 24; 26 MG/1; MG/1
0.5 TABLET, FILM COATED ORAL
Qty: 0 | Refills: 0 | DISCHARGE

## 2022-01-01 RX ORDER — INSULIN GLARGINE 100 [IU]/ML
22 INJECTION, SOLUTION SUBCUTANEOUS AT BEDTIME
Refills: 0 | Status: DISCONTINUED | OUTPATIENT
Start: 2022-01-01 | End: 2022-01-01

## 2022-01-01 RX ORDER — ATORVASTATIN CALCIUM 80 MG/1
1 TABLET, FILM COATED ORAL
Qty: 0 | Refills: 0 | DISCHARGE

## 2022-01-01 RX ORDER — HEPARIN SODIUM 5000 [USP'U]/ML
6500 INJECTION INTRAVENOUS; SUBCUTANEOUS EVERY 6 HOURS
Refills: 0 | Status: DISCONTINUED | OUTPATIENT
Start: 2022-01-01 | End: 2022-01-01

## 2022-01-01 RX ORDER — INSULIN LISPRO 100/ML
VIAL (ML) SUBCUTANEOUS
Refills: 0 | Status: DISCONTINUED | OUTPATIENT
Start: 2022-01-01 | End: 2022-01-01

## 2022-01-01 RX ORDER — HYDROMORPHONE HYDROCHLORIDE 2 MG/ML
1 INJECTION INTRAMUSCULAR; INTRAVENOUS; SUBCUTANEOUS
Refills: 0 | Status: DISCONTINUED | OUTPATIENT
Start: 2022-01-01 | End: 2022-01-01

## 2022-01-01 RX ORDER — INSULIN ASPART 100 [IU]/ML
1 INJECTION, SOLUTION SUBCUTANEOUS
Refills: 0 | Status: DISCONTINUED | OUTPATIENT
Start: 2022-01-01 | End: 2022-01-01

## 2022-01-01 RX ORDER — POTASSIUM CHLORIDE 20 MEQ
20 PACKET (EA) ORAL
Refills: 0 | Status: COMPLETED | OUTPATIENT
Start: 2022-01-01 | End: 2022-01-01

## 2022-01-01 RX ORDER — POTASSIUM CHLORIDE 20 MEQ
40 PACKET (EA) ORAL EVERY 4 HOURS
Refills: 0 | Status: COMPLETED | OUTPATIENT
Start: 2022-01-01 | End: 2022-01-01

## 2022-01-01 RX ORDER — DIAZEPAM 5 MG
2.5 TABLET ORAL ONCE
Refills: 0 | Status: DISCONTINUED | OUTPATIENT
Start: 2022-01-01 | End: 2022-01-01

## 2022-01-01 RX ORDER — FUROSEMIDE 40 MG
40 TABLET ORAL DAILY
Refills: 0 | Status: DISCONTINUED | OUTPATIENT
Start: 2022-01-01 | End: 2022-01-01

## 2022-01-01 RX ORDER — ASPIRIN/CALCIUM CARB/MAGNESIUM 324 MG
1 TABLET ORAL
Qty: 0 | Refills: 0 | DISCHARGE

## 2022-01-01 RX ORDER — POTASSIUM CHLORIDE 20 MEQ
10 PACKET (EA) ORAL
Refills: 0 | Status: COMPLETED | OUTPATIENT
Start: 2022-01-01 | End: 2022-01-01

## 2022-01-01 RX ORDER — POTASSIUM CHLORIDE 20 MEQ
20 PACKET (EA) ORAL
Refills: 0 | Status: DISCONTINUED | OUTPATIENT
Start: 2022-01-01 | End: 2022-01-01

## 2022-01-01 RX ORDER — PANTOPRAZOLE SODIUM 20 MG/1
40 TABLET, DELAYED RELEASE ORAL
Refills: 0 | Status: DISCONTINUED | OUTPATIENT
Start: 2022-01-01 | End: 2022-01-01

## 2022-01-01 RX ORDER — ROBINUL 0.2 MG/ML
0.2 INJECTION INTRAMUSCULAR; INTRAVENOUS EVERY 8 HOURS
Refills: 0 | Status: DISCONTINUED | OUTPATIENT
Start: 2022-01-01 | End: 2022-01-01

## 2022-01-01 RX ORDER — FUROSEMIDE 40 MG
40 TABLET ORAL ONCE
Refills: 0 | Status: COMPLETED | OUTPATIENT
Start: 2022-01-01 | End: 2022-01-01

## 2022-01-01 RX ORDER — SERTRALINE 25 MG/1
50 TABLET, FILM COATED ORAL DAILY
Refills: 0 | Status: DISCONTINUED | OUTPATIENT
Start: 2022-01-01 | End: 2022-01-01

## 2022-01-01 RX ORDER — IBUPROFEN 200 MG
400 TABLET ORAL ONCE
Refills: 0 | Status: DISCONTINUED | OUTPATIENT
Start: 2022-01-01 | End: 2022-01-01

## 2022-01-01 RX ORDER — HYDROMORPHONE HYDROCHLORIDE 2 MG/ML
3 INJECTION INTRAMUSCULAR; INTRAVENOUS; SUBCUTANEOUS
Qty: 100 | Refills: 0 | Status: DISCONTINUED | OUTPATIENT
Start: 2022-01-01 | End: 2022-01-01

## 2022-01-01 RX ORDER — EMPAGLIFLOZIN 10 MG/1
0.5 TABLET, FILM COATED ORAL
Qty: 0 | Refills: 0 | DISCHARGE

## 2022-01-01 RX ORDER — DEXMEDETOMIDINE HYDROCHLORIDE IN 0.9% SODIUM CHLORIDE 4 UG/ML
0.2 INJECTION INTRAVENOUS
Qty: 200 | Refills: 0 | Status: DISCONTINUED | OUTPATIENT
Start: 2022-01-01 | End: 2022-01-01

## 2022-01-01 RX ORDER — PROPOFOL 10 MG/ML
10 INJECTION, EMULSION INTRAVENOUS
Qty: 500 | Refills: 0 | Status: DISCONTINUED | OUTPATIENT
Start: 2022-01-01 | End: 2022-01-01

## 2022-01-01 RX ADMIN — TICAGRELOR 90 MILLIGRAM(S): 90 TABLET ORAL at 17:15

## 2022-01-01 RX ADMIN — Medication 7.78 MICROGRAM(S)/KG/MIN: at 00:17

## 2022-01-01 RX ADMIN — CHLORHEXIDINE GLUCONATE 15 MILLILITER(S): 213 SOLUTION TOPICAL at 18:52

## 2022-01-01 RX ADMIN — CHLORHEXIDINE GLUCONATE 1 APPLICATION(S): 213 SOLUTION TOPICAL at 05:20

## 2022-01-01 RX ADMIN — INSULIN GLARGINE 22 UNIT(S): 100 INJECTION, SOLUTION SUBCUTANEOUS at 21:50

## 2022-01-01 RX ADMIN — BUMETANIDE 2 MILLIGRAM(S): 0.25 INJECTION INTRAMUSCULAR; INTRAVENOUS at 12:07

## 2022-01-01 RX ADMIN — HYDROMORPHONE HYDROCHLORIDE 1 MILLIGRAM(S): 2 INJECTION INTRAMUSCULAR; INTRAVENOUS; SUBCUTANEOUS at 13:06

## 2022-01-01 RX ADMIN — Medication 7.78 MICROGRAM(S)/KG/MIN: at 19:14

## 2022-01-01 RX ADMIN — HEPARIN SODIUM 1400 UNIT(S)/HR: 5000 INJECTION INTRAVENOUS; SUBCUTANEOUS at 18:17

## 2022-01-01 RX ADMIN — CHLORHEXIDINE GLUCONATE 1 APPLICATION(S): 213 SOLUTION TOPICAL at 05:15

## 2022-01-01 RX ADMIN — Medication 2: at 05:15

## 2022-01-01 RX ADMIN — ROBINUL 0.2 MILLIGRAM(S): 0.2 INJECTION INTRAMUSCULAR; INTRAVENOUS at 12:47

## 2022-01-01 RX ADMIN — Medication 2: at 17:23

## 2022-01-01 RX ADMIN — BUMETANIDE 2 MILLIGRAM(S): 0.25 INJECTION INTRAMUSCULAR; INTRAVENOUS at 06:02

## 2022-01-01 RX ADMIN — Medication 40 MILLIEQUIVALENT(S): at 09:33

## 2022-01-01 RX ADMIN — Medication 81 MILLIGRAM(S): at 12:03

## 2022-01-01 RX ADMIN — PIPERACILLIN AND TAZOBACTAM 200 GRAM(S): 4; .5 INJECTION, POWDER, LYOPHILIZED, FOR SOLUTION INTRAVENOUS at 12:59

## 2022-01-01 RX ADMIN — TICAGRELOR 90 MILLIGRAM(S): 90 TABLET ORAL at 05:19

## 2022-01-01 RX ADMIN — PROPOFOL 4.98 MICROGRAM(S)/KG/MIN: 10 INJECTION, EMULSION INTRAVENOUS at 00:22

## 2022-01-01 RX ADMIN — SODIUM CHLORIDE 100 MILLILITER(S): 9 INJECTION, SOLUTION INTRAVENOUS at 19:36

## 2022-01-01 RX ADMIN — HYDROMORPHONE HYDROCHLORIDE 2 MILLIGRAM(S): 2 INJECTION INTRAMUSCULAR; INTRAVENOUS; SUBCUTANEOUS at 16:08

## 2022-01-01 RX ADMIN — Medication 50 MILLIEQUIVALENT(S): at 10:24

## 2022-01-01 RX ADMIN — PROPOFOL 4.98 MICROGRAM(S)/KG/MIN: 10 INJECTION, EMULSION INTRAVENOUS at 12:04

## 2022-01-01 RX ADMIN — SODIUM CHLORIDE 100 MILLILITER(S): 9 INJECTION, SOLUTION INTRAVENOUS at 09:30

## 2022-01-01 RX ADMIN — Medication 7.78 MICROGRAM(S)/KG/MIN: at 07:20

## 2022-01-01 RX ADMIN — Medication 100 MILLIEQUIVALENT(S): at 01:41

## 2022-01-01 RX ADMIN — DEXMEDETOMIDINE HYDROCHLORIDE IN 0.9% SODIUM CHLORIDE 4.15 MICROGRAM(S)/KG/HR: 4 INJECTION INTRAVENOUS at 11:36

## 2022-01-01 RX ADMIN — POTASSIUM PHOSPHATE, MONOBASIC POTASSIUM PHOSPHATE, DIBASIC 62.5 MILLIMOLE(S): 236; 224 INJECTION, SOLUTION INTRAVENOUS at 09:47

## 2022-01-01 RX ADMIN — Medication 1.25 MICROGRAM(S)/KG/MIN: at 20:19

## 2022-01-01 RX ADMIN — Medication 100 MILLIEQUIVALENT(S): at 04:41

## 2022-01-01 RX ADMIN — Medication 12.5 MICROGRAM(S)/KG/MIN: at 06:01

## 2022-01-01 RX ADMIN — PIPERACILLIN AND TAZOBACTAM 25 GRAM(S): 4; .5 INJECTION, POWDER, LYOPHILIZED, FOR SOLUTION INTRAVENOUS at 20:33

## 2022-01-01 RX ADMIN — PROPOFOL 4.98 MICROGRAM(S)/KG/MIN: 10 INJECTION, EMULSION INTRAVENOUS at 06:44

## 2022-01-01 RX ADMIN — CHLORHEXIDINE GLUCONATE 1 APPLICATION(S): 213 SOLUTION TOPICAL at 06:09

## 2022-01-01 RX ADMIN — TICAGRELOR 90 MILLIGRAM(S): 90 TABLET ORAL at 17:22

## 2022-01-01 RX ADMIN — PROPOFOL 4.98 MICROGRAM(S)/KG/MIN: 10 INJECTION, EMULSION INTRAVENOUS at 23:26

## 2022-01-01 RX ADMIN — HYDROMORPHONE HYDROCHLORIDE 2 MILLIGRAM(S): 2 INJECTION INTRAMUSCULAR; INTRAVENOUS; SUBCUTANEOUS at 16:24

## 2022-01-01 RX ADMIN — Medication 7.31 MICROGRAM(S)/KG/MIN: at 12:30

## 2022-01-01 RX ADMIN — PROPOFOL 4.98 MICROGRAM(S)/KG/MIN: 10 INJECTION, EMULSION INTRAVENOUS at 21:31

## 2022-01-01 RX ADMIN — HEPARIN SODIUM 5000 UNIT(S): 5000 INJECTION INTRAVENOUS; SUBCUTANEOUS at 17:47

## 2022-01-01 RX ADMIN — Medication 6: at 18:34

## 2022-01-01 RX ADMIN — Medication 7.78 MICROGRAM(S)/KG/MIN: at 23:39

## 2022-01-01 RX ADMIN — PROPOFOL 4.98 MICROGRAM(S)/KG/MIN: 10 INJECTION, EMULSION INTRAVENOUS at 04:46

## 2022-01-01 RX ADMIN — Medication 50 MILLIEQUIVALENT(S): at 11:53

## 2022-01-01 RX ADMIN — TICAGRELOR 90 MILLIGRAM(S): 90 TABLET ORAL at 18:35

## 2022-01-01 RX ADMIN — CEFTRIAXONE 100 MILLIGRAM(S): 500 INJECTION, POWDER, FOR SOLUTION INTRAMUSCULAR; INTRAVENOUS at 08:01

## 2022-01-01 RX ADMIN — HEPARIN SODIUM 1200 UNIT(S)/HR: 5000 INJECTION INTRAVENOUS; SUBCUTANEOUS at 20:34

## 2022-01-01 RX ADMIN — Medication 2.5 MILLIGRAM(S): at 12:40

## 2022-01-01 RX ADMIN — TICAGRELOR 90 MILLIGRAM(S): 90 TABLET ORAL at 18:52

## 2022-01-01 RX ADMIN — FENTANYL CITRATE 100 MICROGRAM(S): 50 INJECTION INTRAVENOUS at 02:45

## 2022-01-01 RX ADMIN — PANTOPRAZOLE SODIUM 40 MILLIGRAM(S): 20 TABLET, DELAYED RELEASE ORAL at 06:08

## 2022-01-01 RX ADMIN — PROPOFOL 4.98 MICROGRAM(S)/KG/MIN: 10 INJECTION, EMULSION INTRAVENOUS at 21:08

## 2022-01-01 RX ADMIN — Medication 20 MILLIGRAM(S): at 15:36

## 2022-01-01 RX ADMIN — CHLORHEXIDINE GLUCONATE 15 MILLILITER(S): 213 SOLUTION TOPICAL at 05:19

## 2022-01-01 RX ADMIN — Medication 7.78 MICROGRAM(S)/KG/MIN: at 22:12

## 2022-01-01 RX ADMIN — BUMETANIDE 2 MILLIGRAM(S): 0.25 INJECTION INTRAMUSCULAR; INTRAVENOUS at 06:26

## 2022-01-01 RX ADMIN — TICAGRELOR 90 MILLIGRAM(S): 90 TABLET ORAL at 05:34

## 2022-01-01 RX ADMIN — Medication 0.5 INCH(S): at 13:11

## 2022-01-01 RX ADMIN — HEPARIN SODIUM 1400 UNIT(S)/HR: 5000 INJECTION INTRAVENOUS; SUBCUTANEOUS at 11:06

## 2022-01-01 RX ADMIN — HEPARIN SODIUM 1200 UNIT(S)/HR: 5000 INJECTION INTRAVENOUS; SUBCUTANEOUS at 10:08

## 2022-01-01 RX ADMIN — HEPARIN SODIUM 850 UNIT(S)/HR: 5000 INJECTION INTRAVENOUS; SUBCUTANEOUS at 00:50

## 2022-01-01 RX ADMIN — ROBINUL 0.2 MILLIGRAM(S): 0.2 INJECTION INTRAMUSCULAR; INTRAVENOUS at 05:22

## 2022-01-01 RX ADMIN — PROPOFOL 4.98 MICROGRAM(S)/KG/MIN: 10 INJECTION, EMULSION INTRAVENOUS at 12:07

## 2022-01-01 RX ADMIN — HEPARIN SODIUM 850 UNIT(S)/HR: 5000 INJECTION INTRAVENOUS; SUBCUTANEOUS at 07:16

## 2022-01-01 RX ADMIN — SODIUM CHLORIDE 1850 MILLILITER(S): 9 INJECTION INTRAMUSCULAR; INTRAVENOUS; SUBCUTANEOUS at 09:26

## 2022-01-01 RX ADMIN — DEXMEDETOMIDINE HYDROCHLORIDE IN 0.9% SODIUM CHLORIDE 4.15 MICROGRAM(S)/KG/HR: 4 INJECTION INTRAVENOUS at 19:26

## 2022-01-01 RX ADMIN — HEPARIN SODIUM 3000 UNIT(S): 5000 INJECTION INTRAVENOUS; SUBCUTANEOUS at 11:13

## 2022-01-01 RX ADMIN — CEFTRIAXONE 100 MILLIGRAM(S): 500 INJECTION, POWDER, FOR SOLUTION INTRAMUSCULAR; INTRAVENOUS at 10:22

## 2022-01-01 RX ADMIN — Medication 40 MILLIGRAM(S): at 12:04

## 2022-01-01 RX ADMIN — Medication 3: at 12:04

## 2022-01-01 RX ADMIN — HYDROMORPHONE HYDROCHLORIDE 1 MILLIGRAM(S): 2 INJECTION INTRAMUSCULAR; INTRAVENOUS; SUBCUTANEOUS at 12:40

## 2022-01-01 RX ADMIN — HEPARIN SODIUM 1200 UNIT(S)/HR: 5000 INJECTION INTRAVENOUS; SUBCUTANEOUS at 17:43

## 2022-01-01 RX ADMIN — INSULIN GLARGINE 26 UNIT(S): 100 INJECTION, SOLUTION SUBCUTANEOUS at 23:35

## 2022-01-01 RX ADMIN — ROBINUL 0.2 MILLIGRAM(S): 0.2 INJECTION INTRAMUSCULAR; INTRAVENOUS at 13:25

## 2022-01-01 RX ADMIN — PANTOPRAZOLE SODIUM 40 MILLIGRAM(S): 20 TABLET, DELAYED RELEASE ORAL at 11:43

## 2022-01-01 RX ADMIN — HEPARIN SODIUM 4700 UNIT(S): 5000 INJECTION INTRAVENOUS; SUBCUTANEOUS at 03:12

## 2022-01-01 RX ADMIN — Medication 3: at 17:30

## 2022-01-01 RX ADMIN — Medication 5: at 23:37

## 2022-01-01 RX ADMIN — Medication 3: at 11:51

## 2022-01-01 RX ADMIN — CEFTRIAXONE 100 MILLIGRAM(S): 500 INJECTION, POWDER, FOR SOLUTION INTRAMUSCULAR; INTRAVENOUS at 07:45

## 2022-01-01 RX ADMIN — Medication 81 MILLIGRAM(S): at 11:53

## 2022-01-01 RX ADMIN — ROBINUL 0.2 MILLIGRAM(S): 0.2 INJECTION INTRAMUSCULAR; INTRAVENOUS at 21:14

## 2022-01-01 RX ADMIN — TICAGRELOR 90 MILLIGRAM(S): 90 TABLET ORAL at 17:47

## 2022-01-01 RX ADMIN — Medication 3: at 06:25

## 2022-01-01 RX ADMIN — INSULIN HUMAN 12 UNIT(S)/HR: 100 INJECTION, SOLUTION SUBCUTANEOUS at 11:20

## 2022-01-01 RX ADMIN — Medication 3: at 17:42

## 2022-01-01 RX ADMIN — INSULIN HUMAN 3 UNIT(S)/HR: 100 INJECTION, SOLUTION SUBCUTANEOUS at 00:22

## 2022-01-01 RX ADMIN — Medication 7.78 MICROGRAM(S)/KG/MIN: at 09:06

## 2022-01-01 RX ADMIN — HEPARIN SODIUM 5000 UNIT(S): 5000 INJECTION INTRAVENOUS; SUBCUTANEOUS at 17:15

## 2022-01-01 RX ADMIN — Medication 81 MILLIGRAM(S): at 12:07

## 2022-01-01 RX ADMIN — INSULIN GLARGINE 20 UNIT(S): 100 INJECTION, SOLUTION SUBCUTANEOUS at 13:17

## 2022-01-01 RX ADMIN — CHLORHEXIDINE GLUCONATE 15 MILLILITER(S): 213 SOLUTION TOPICAL at 17:15

## 2022-01-01 RX ADMIN — HYDROMORPHONE HYDROCHLORIDE 1 MILLIGRAM(S): 2 INJECTION INTRAMUSCULAR; INTRAVENOUS; SUBCUTANEOUS at 12:48

## 2022-01-01 RX ADMIN — HEPARIN SODIUM 4700 UNIT(S): 5000 INJECTION INTRAVENOUS; SUBCUTANEOUS at 12:07

## 2022-01-01 RX ADMIN — POTASSIUM PHOSPHATE, MONOBASIC POTASSIUM PHOSPHATE, DIBASIC 83.33 MILLIMOLE(S): 236; 224 INJECTION, SOLUTION INTRAVENOUS at 17:47

## 2022-01-01 RX ADMIN — ATORVASTATIN CALCIUM 40 MILLIGRAM(S): 80 TABLET, FILM COATED ORAL at 23:08

## 2022-01-01 RX ADMIN — PANTOPRAZOLE SODIUM 40 MILLIGRAM(S): 20 TABLET, DELAYED RELEASE ORAL at 12:07

## 2022-01-01 RX ADMIN — ATORVASTATIN CALCIUM 40 MILLIGRAM(S): 80 TABLET, FILM COATED ORAL at 21:50

## 2022-01-01 RX ADMIN — HEPARIN SODIUM 950 UNIT(S)/HR: 5000 INJECTION INTRAVENOUS; SUBCUTANEOUS at 19:25

## 2022-01-01 RX ADMIN — Medication 40 MILLIGRAM(S): at 06:27

## 2022-01-01 RX ADMIN — CEFTRIAXONE 100 MILLIGRAM(S): 500 INJECTION, POWDER, FOR SOLUTION INTRAMUSCULAR; INTRAVENOUS at 08:24

## 2022-01-01 RX ADMIN — INSULIN HUMAN 12 UNIT(S)/HR: 100 INJECTION, SOLUTION SUBCUTANEOUS at 12:43

## 2022-01-01 RX ADMIN — Medication 81 MILLIGRAM(S): at 11:43

## 2022-01-01 RX ADMIN — Medication 1 MILLIGRAM(S): at 00:16

## 2022-01-01 RX ADMIN — TICAGRELOR 90 MILLIGRAM(S): 90 TABLET ORAL at 17:42

## 2022-01-01 RX ADMIN — HYDROMORPHONE HYDROCHLORIDE 3 MG/HR: 2 INJECTION INTRAMUSCULAR; INTRAVENOUS; SUBCUTANEOUS at 19:28

## 2022-01-01 RX ADMIN — Medication 1: at 23:40

## 2022-01-01 RX ADMIN — TICAGRELOR 90 MILLIGRAM(S): 90 TABLET ORAL at 06:08

## 2022-01-01 RX ADMIN — FENTANYL CITRATE 100 MICROGRAM(S): 50 INJECTION INTRAVENOUS at 02:30

## 2022-01-01 RX ADMIN — HYDROMORPHONE HYDROCHLORIDE 1 MILLIGRAM(S): 2 INJECTION INTRAMUSCULAR; INTRAVENOUS; SUBCUTANEOUS at 13:01

## 2022-01-01 RX ADMIN — HEPARIN SODIUM 5000 UNIT(S): 5000 INJECTION INTRAVENOUS; SUBCUTANEOUS at 05:19

## 2022-01-01 RX ADMIN — TICAGRELOR 90 MILLIGRAM(S): 90 TABLET ORAL at 06:03

## 2022-01-01 RX ADMIN — Medication 5: at 12:43

## 2022-01-01 RX ADMIN — PIPERACILLIN AND TAZOBACTAM 25 GRAM(S): 4; .5 INJECTION, POWDER, LYOPHILIZED, FOR SOLUTION INTRAVENOUS at 03:19

## 2022-01-01 RX ADMIN — INSULIN GLARGINE 26 UNIT(S): 100 INJECTION, SOLUTION SUBCUTANEOUS at 23:09

## 2022-01-01 RX ADMIN — Medication 7.78 MICROGRAM(S)/KG/MIN: at 19:35

## 2022-01-01 RX ADMIN — Medication 7.78 MICROGRAM(S)/KG/MIN: at 02:56

## 2022-01-01 RX ADMIN — HYDROMORPHONE HYDROCHLORIDE 2 MILLIGRAM(S): 2 INJECTION INTRAMUSCULAR; INTRAVENOUS; SUBCUTANEOUS at 14:05

## 2022-01-01 RX ADMIN — Medication 30 MILLILITER(S): at 10:08

## 2022-01-01 RX ADMIN — POTASSIUM PHOSPHATE, MONOBASIC POTASSIUM PHOSPHATE, DIBASIC 62.5 MILLIMOLE(S): 236; 224 INJECTION, SOLUTION INTRAVENOUS at 20:23

## 2022-01-01 RX ADMIN — HEPARIN SODIUM 5000 UNIT(S): 5000 INJECTION INTRAVENOUS; SUBCUTANEOUS at 05:01

## 2022-01-01 RX ADMIN — TICAGRELOR 90 MILLIGRAM(S): 90 TABLET ORAL at 06:27

## 2022-01-01 RX ADMIN — HEPARIN SODIUM 950 UNIT(S)/HR: 5000 INJECTION INTRAVENOUS; SUBCUTANEOUS at 19:27

## 2022-01-01 RX ADMIN — PANTOPRAZOLE SODIUM 40 MILLIGRAM(S): 20 TABLET, DELAYED RELEASE ORAL at 11:52

## 2022-01-01 RX ADMIN — PIPERACILLIN AND TAZOBACTAM 25 GRAM(S): 4; .5 INJECTION, POWDER, LYOPHILIZED, FOR SOLUTION INTRAVENOUS at 03:08

## 2022-01-01 RX ADMIN — INSULIN GLARGINE 20 UNIT(S): 100 INJECTION, SOLUTION SUBCUTANEOUS at 21:20

## 2022-01-01 RX ADMIN — Medication 7.78 MICROGRAM(S)/KG/MIN: at 14:45

## 2022-01-01 RX ADMIN — CHLORHEXIDINE GLUCONATE 15 MILLILITER(S): 213 SOLUTION TOPICAL at 17:29

## 2022-01-01 RX ADMIN — CHLORHEXIDINE GLUCONATE 15 MILLILITER(S): 213 SOLUTION TOPICAL at 06:01

## 2022-01-01 RX ADMIN — HEPARIN SODIUM 850 UNIT(S)/HR: 5000 INJECTION INTRAVENOUS; SUBCUTANEOUS at 14:22

## 2022-01-01 RX ADMIN — Medication 7.78 MICROGRAM(S)/KG/MIN: at 05:19

## 2022-01-01 RX ADMIN — CHLORHEXIDINE GLUCONATE 15 MILLILITER(S): 213 SOLUTION TOPICAL at 05:34

## 2022-01-01 RX ADMIN — Medication 12.5 MICROGRAM(S)/KG/MIN: at 19:35

## 2022-01-01 RX ADMIN — CHLORHEXIDINE GLUCONATE 15 MILLILITER(S): 213 SOLUTION TOPICAL at 17:47

## 2022-01-01 RX ADMIN — Medication 12.5 MICROGRAM(S)/KG/MIN: at 00:46

## 2022-01-01 RX ADMIN — Medication 100 MILLIEQUIVALENT(S): at 00:23

## 2022-01-01 RX ADMIN — PROPOFOL 4.98 MICROGRAM(S)/KG/MIN: 10 INJECTION, EMULSION INTRAVENOUS at 05:19

## 2022-01-01 RX ADMIN — HEPARIN SODIUM 5000 UNIT(S): 5000 INJECTION INTRAVENOUS; SUBCUTANEOUS at 18:35

## 2022-01-01 RX ADMIN — Medication 650 MILLIGRAM(S): at 12:34

## 2022-01-01 RX ADMIN — HEPARIN SODIUM 850 UNIT(S)/HR: 5000 INJECTION INTRAVENOUS; SUBCUTANEOUS at 07:12

## 2022-01-01 RX ADMIN — INSULIN GLARGINE 32 UNIT(S): 100 INJECTION, SOLUTION SUBCUTANEOUS at 23:36

## 2022-01-01 RX ADMIN — Medication 7.78 MICROGRAM(S)/KG/MIN: at 00:46

## 2022-01-01 RX ADMIN — INSULIN HUMAN 12 UNIT(S)/HR: 100 INJECTION, SOLUTION SUBCUTANEOUS at 21:08

## 2022-01-01 RX ADMIN — CHLORHEXIDINE GLUCONATE 1 APPLICATION(S): 213 SOLUTION TOPICAL at 06:26

## 2022-01-01 RX ADMIN — PIPERACILLIN AND TAZOBACTAM 25 GRAM(S): 4; .5 INJECTION, POWDER, LYOPHILIZED, FOR SOLUTION INTRAVENOUS at 21:03

## 2022-01-01 RX ADMIN — CHLORHEXIDINE GLUCONATE 15 MILLILITER(S): 213 SOLUTION TOPICAL at 18:35

## 2022-01-01 RX ADMIN — Medication 5: at 05:35

## 2022-01-01 RX ADMIN — PROPOFOL 4.98 MICROGRAM(S)/KG/MIN: 10 INJECTION, EMULSION INTRAVENOUS at 07:51

## 2022-01-01 RX ADMIN — Medication 7.31 MICROGRAM(S)/KG/MIN: at 22:47

## 2022-01-01 RX ADMIN — Medication 4 UNIT(S): at 18:22

## 2022-01-01 RX ADMIN — CHLORHEXIDINE GLUCONATE 15 MILLILITER(S): 213 SOLUTION TOPICAL at 06:26

## 2022-01-01 RX ADMIN — Medication 4: at 12:06

## 2022-01-01 RX ADMIN — HEPARIN SODIUM 850 UNIT(S)/HR: 5000 INJECTION INTRAVENOUS; SUBCUTANEOUS at 19:27

## 2022-01-01 RX ADMIN — Medication 40 MILLIGRAM(S): at 00:04

## 2022-01-01 RX ADMIN — CHLORHEXIDINE GLUCONATE 1 APPLICATION(S): 213 SOLUTION TOPICAL at 05:37

## 2022-01-01 RX ADMIN — TICAGRELOR 90 MILLIGRAM(S): 90 TABLET ORAL at 05:14

## 2022-01-01 RX ADMIN — HEPARIN SODIUM 1400 UNIT(S)/HR: 5000 INJECTION INTRAVENOUS; SUBCUTANEOUS at 23:59

## 2022-01-01 RX ADMIN — HYDROMORPHONE HYDROCHLORIDE 1 MG/HR: 2 INJECTION INTRAMUSCULAR; INTRAVENOUS; SUBCUTANEOUS at 12:47

## 2022-01-01 RX ADMIN — Medication 100 MILLIEQUIVALENT(S): at 02:25

## 2022-01-01 RX ADMIN — CHLORHEXIDINE GLUCONATE 1 APPLICATION(S): 213 SOLUTION TOPICAL at 05:02

## 2022-01-01 RX ADMIN — INSULIN HUMAN 4 UNIT(S): 100 INJECTION, SOLUTION SUBCUTANEOUS at 21:35

## 2022-01-01 RX ADMIN — Medication 400 MILLIGRAM(S): at 20:18

## 2022-01-01 RX ADMIN — Medication 7.78 MICROGRAM(S)/KG/MIN: at 11:20

## 2022-01-01 RX ADMIN — Medication 40 MILLIEQUIVALENT(S): at 13:25

## 2022-01-01 RX ADMIN — PIPERACILLIN AND TAZOBACTAM 25 GRAM(S): 4; .5 INJECTION, POWDER, LYOPHILIZED, FOR SOLUTION INTRAVENOUS at 11:44

## 2022-01-01 RX ADMIN — HYDROMORPHONE HYDROCHLORIDE 2 MG/HR: 2 INJECTION INTRAMUSCULAR; INTRAVENOUS; SUBCUTANEOUS at 13:07

## 2022-01-01 RX ADMIN — INSULIN HUMAN 12 UNIT(S)/HR: 100 INJECTION, SOLUTION SUBCUTANEOUS at 11:00

## 2022-01-01 RX ADMIN — CHLORHEXIDINE GLUCONATE 1 APPLICATION(S): 213 SOLUTION TOPICAL at 06:17

## 2022-01-01 RX ADMIN — INSULIN HUMAN 4 UNIT(S): 100 INJECTION, SOLUTION SUBCUTANEOUS at 18:59

## 2022-01-01 RX ADMIN — SERTRALINE 50 MILLIGRAM(S): 25 TABLET, FILM COATED ORAL at 12:04

## 2022-01-01 RX ADMIN — Medication 7.78 MICROGRAM(S)/KG/MIN: at 12:04

## 2022-01-01 RX ADMIN — Medication 650 MILLIGRAM(S): at 15:42

## 2022-01-01 RX ADMIN — Medication 81 MILLIGRAM(S): at 11:36

## 2022-01-01 RX ADMIN — BUMETANIDE 2 MILLIGRAM(S): 0.25 INJECTION INTRAMUSCULAR; INTRAVENOUS at 16:24

## 2022-01-01 RX ADMIN — Medication 650 MILLIGRAM(S): at 18:54

## 2022-01-01 RX ADMIN — Medication 400 MILLIGRAM(S): at 20:58

## 2022-01-01 RX ADMIN — Medication 7.78 MICROGRAM(S)/KG/MIN: at 14:17

## 2022-01-01 RX ADMIN — MORPHINE SULFATE 1 MILLIGRAM(S): 50 CAPSULE, EXTENDED RELEASE ORAL at 13:04

## 2022-01-01 RX ADMIN — SODIUM CHLORIDE 100 MILLILITER(S): 9 INJECTION, SOLUTION INTRAVENOUS at 05:18

## 2022-01-01 RX ADMIN — PROPOFOL 4.98 MICROGRAM(S)/KG/MIN: 10 INJECTION, EMULSION INTRAVENOUS at 11:30

## 2022-01-01 RX ADMIN — PANTOPRAZOLE SODIUM 40 MILLIGRAM(S): 20 TABLET, DELAYED RELEASE ORAL at 12:03

## 2022-01-01 RX ADMIN — Medication 7.78 MICROGRAM(S)/KG/MIN: at 12:43

## 2022-01-01 RX ADMIN — CHLORHEXIDINE GLUCONATE 15 MILLILITER(S): 213 SOLUTION TOPICAL at 05:01

## 2022-01-01 RX ADMIN — CHLORHEXIDINE GLUCONATE 15 MILLILITER(S): 213 SOLUTION TOPICAL at 05:14

## 2022-01-01 RX ADMIN — HEPARIN SODIUM 1200 UNIT(S)/HR: 5000 INJECTION INTRAVENOUS; SUBCUTANEOUS at 03:25

## 2022-01-01 RX ADMIN — DEXMEDETOMIDINE HYDROCHLORIDE IN 0.9% SODIUM CHLORIDE 4.15 MICROGRAM(S)/KG/HR: 4 INJECTION INTRAVENOUS at 06:12

## 2022-01-01 RX ADMIN — Medication 7.78 MICROGRAM(S)/KG/MIN: at 06:44

## 2022-01-01 RX ADMIN — BUMETANIDE 2 MILLIGRAM(S): 0.25 INJECTION INTRAMUSCULAR; INTRAVENOUS at 23:36

## 2022-01-01 RX ADMIN — HEPARIN SODIUM 5000 UNIT(S): 5000 INJECTION INTRAVENOUS; SUBCUTANEOUS at 06:01

## 2022-01-01 RX ADMIN — HEPARIN SODIUM 950 UNIT(S)/HR: 5000 INJECTION INTRAVENOUS; SUBCUTANEOUS at 12:07

## 2022-01-01 RX ADMIN — HEPARIN SODIUM 1200 UNIT(S)/HR: 5000 INJECTION INTRAVENOUS; SUBCUTANEOUS at 08:05

## 2022-01-01 RX ADMIN — Medication 650 MILLIGRAM(S): at 12:04

## 2022-01-01 RX ADMIN — MORPHINE SULFATE 1 MILLIGRAM(S): 50 CAPSULE, EXTENDED RELEASE ORAL at 12:49

## 2022-01-01 RX ADMIN — Medication 2: at 08:17

## 2022-01-01 RX ADMIN — Medication 50 MILLIEQUIVALENT(S): at 13:00

## 2022-01-01 RX ADMIN — PANTOPRAZOLE SODIUM 40 MILLIGRAM(S): 20 TABLET, DELAYED RELEASE ORAL at 11:36

## 2022-01-01 RX ADMIN — Medication 7.78 MICROGRAM(S)/KG/MIN: at 06:26

## 2022-01-01 RX ADMIN — FENTANYL CITRATE 4.15 MICROGRAM(S)/KG/HR: 50 INJECTION INTRAVENOUS at 12:35

## 2022-01-01 RX ADMIN — HEPARIN SODIUM 850 UNIT(S)/HR: 5000 INJECTION INTRAVENOUS; SUBCUTANEOUS at 07:20

## 2022-01-01 RX ADMIN — TICAGRELOR 90 MILLIGRAM(S): 90 TABLET ORAL at 05:01

## 2022-01-01 RX ADMIN — ONDANSETRON 8 MILLIGRAM(S): 8 TABLET, FILM COATED ORAL at 21:04

## 2022-01-01 RX ADMIN — HYDROMORPHONE HYDROCHLORIDE 2 MILLIGRAM(S): 2 INJECTION INTRAMUSCULAR; INTRAVENOUS; SUBCUTANEOUS at 14:20

## 2022-01-01 RX ADMIN — PROPOFOL 4.98 MICROGRAM(S)/KG/MIN: 10 INJECTION, EMULSION INTRAVENOUS at 00:46

## 2022-06-30 NOTE — ED PROVIDER NOTE - PROGRESS NOTE DETAILS
BP improving with gentle hydration, repeat pressure 94/49 with MAP of 60. Will continue to monitor pt seen by cardiology, troponin noted to be elevated although CPKs are only min elevated. Requesting heparin gtt. At this time cardiology does not feel pt is a candidate for any aggressive intervention based on history and is recommending medical management. Also requesting Lasix 20 mg as he believes that pt may be volume overloaded BP had stabilized but is now again down trending. Spoke with cardiology will place on Levophed drip. ICU consult place Spoke with ICU will come to see the pt. Pt BP improved currently chest pain free at this time pt remains comfortable ICU accepted

## 2022-06-30 NOTE — ED PROVIDER NOTE - CRITICAL CARE ATTENDING CONTRIBUTION TO CARE
BP improving with gentle hydration, repeat pressure 94/49 with MAP of 60. Will continue to monitor. pt seen by cardiology, troponin noted to be elevated although CPKs are only min elevated. Requesting heparin gtt. At this time cardiology does not feel pt is a candidate for any aggressive intervention based on history and is recommending medical management. Also requesting Lasix 20 mg as he believes that pt may be volume overloaded. BP had stabilized but is now again down trending. Spoke with cardiology will place on Levophed drip. ICU consult place. Spoke with ICU will come to see the pt. Pt BP improved currently chest pain free at this time. Pt remains comfortable ICU accepted

## 2022-06-30 NOTE — H&P ADULT - HISTORY OF PRESENT ILLNESS
BEKAH AMOR is a 78 yo male brought to the ED because of chest discomfort. Pt reports that he is experiencing substernal chest pain since last night. He reports that the pain has been constant. Feels similar to his previous MIs. Pt reports  that approx 2 months pt had an MI and he reports that he had several stents collapse. Denies fever, chills, N/V, abd pain. Pt does note some tremors, SOB and a non productive.   PMH of Cellulitis CHF (congestive heart failure) Chronic back pain Diabetes on metformin, Humalog insulin pump Heart attack 1990,2005 Hiatal hernia High cholesterol HTN (hypertension) Insulin pump in place Lumbar herniated disc Lung cancer, upper lobe, left OA (osteoarthritis) Sleep apnea uses c pap at home Spinal stenosis in cervical region Spinal stenosis of lumbar region Stented coronary artery Drug eluding x 6 stents TIA (transient ischemic attack) Jan 2014 Vertigo.    BEKAH AMOR is a 76 yo male brought to the ED because of chest discomfort. Patient reports that he is experiencing substernal chest pain since last night. He reports that the pain has been constant. Feels similar to his previous MIs. Pt reports  that approx 2 months pt had an MI and he reports that he had several stents collapse. Denies fever, chills, N/V, abd pain. Pt does note some tremors, SOB and a non productive.   PMH of Cellulitis CHF (congestive heart failure) Chronic back pain Diabetes on metformin, Humalog insulin pump Heart attack 1990,2005 Hiatal hernia High cholesterol HTN (hypertension) Insulin pump in place Lumbar herniated disc Lung cancer, upper lobe, left OA (osteoarthritis) Sleep apnea uses c pap at home Spinal stenosis in cervical region Spinal stenosis of lumbar region Stented coronary artery Drug eluding x 6 stents TIA (transient ischemic attack) Jan 2014 Vertigo.

## 2022-06-30 NOTE — CONSULT NOTE ADULT - ASSESSMENT
The patient is a 77 year old male with a history of DM, CAD with prior MI s/p multiple PCI, chronic systolic heart failure s/p CRT-D who presents with chest pain and shortness of breath.    Plan:  - Chest pain may be due to NSTEMI  - The patient has a history of multiple PCI. Will obtain recent cath information but the patient is likely not a candidate for further intervention.  - ECG with biventricular pacing  - Troponin mildly elevated at 641 in the setting of possible NSTEMI vs. heart failure. Continue to trend.  - Continue aspirin 81 mg daily  - Continue ticagrelor 90 mg bid  - Will start heparin drip for now for medical treatment of NSTEMI and will decide duration based on subsequent cardiac enzymes  - Hold Entresto for now due to JIMENEZ  - Continue spironolactone 25 mg daily  - Continue carvedilol 3.125 mg bid  - If able, can continue Jardiance 10 mg daily for heart failure  - BP initially low and improved with IV fluids. However, patient currently in heart failure as noted on exam and imaging. Stop IV fluids.  - Give furosemide 20-40 mg IV now and continue q12h  - Check echo  The patient is a 77 year old male with a history of DM, CAD with prior MI s/p multiple PCI, chronic systolic heart failure s/p CRT-D who presents with chest pain and shortness of breath.    Plan:  - Chest pain may be due to NSTEMI  - The patient has a history of multiple PCI. Will obtain recent cath information but the patient is likely not a candidate for further intervention.  - ECG with biventricular pacing  - Troponin mildly elevated at 641 in the setting of possible NSTEMI vs. heart failure. Continue to trend.  - Continue aspirin 81 mg daily  - Continue ticagrelor 90 mg bid  - Will start heparin drip for now for medical treatment of NSTEMI and will decide duration based on subsequent cardiac enzymes  - Hold Entresto for now due to JIMENEZ  - Continue spironolactone 25 mg daily  - Continue carvedilol 3.125 mg bid  - If able, can continue Jardiance 10 mg daily for heart failure  - BP initially low and improved with IV fluids. However, patient currently in heart failure as noted on exam and imaging. Stop IV fluids.  - Give furosemide 20-40 mg IV now and continue q12h  - Check echo     ADDENDUM:  - Now hypotensive again  - Hold all antihypertensive medications  - Start norepinephrine  - May need addition of dobutamine once blood pressures improve  - ICU eval  - [facility] discussions The patient is a 77 year old male with a history of DM, CAD with prior MI s/p multiple PCI, chronic systolic heart failure s/p CRT-D who presents with chest pain and shortness of breath.    Plan:  - Chest pain may be due to NSTEMI  - The patient has a history of multiple PCI. Will obtain recent cath information but the patient is likely not a candidate for further intervention.  - ECG with biventricular pacing  - Troponin mildly elevated at 641 in the setting of possible NSTEMI vs. heart failure. Continue to trend.  - Continue aspirin 81 mg daily  - Continue ticagrelor 90 mg bid  - Will start heparin drip for now for medical treatment of NSTEMI and will decide duration based on subsequent cardiac enzymes  - Hold Entresto for now due to JIMENEZ  - Continue spironolactone 25 mg daily  - Continue carvedilol 3.125 mg bid  - If able, can continue Jardiance 10 mg daily for heart failure  - BP initially low and improved with IV fluids. However, patient currently in heart failure as noted on exam and imaging. Stop IV fluids.  - Give furosemide 20-40 mg IV now and continue q12h  - Check echo     ADDENDUM:  - Now hypotensive again  - Hold all antihypertensive medications  - Start norepinephrine  - May need addition of dobutamine once blood pressures improve  - Awaiting callback from outpatient cardiologist  - ICU eval  - [facility] discussions

## 2022-06-30 NOTE — CONSULT NOTE ADULT - SUBJECTIVE AND OBJECTIVE BOX
Patient is a 77y old  Male who presents with a chief complaint of     Type: 2 DX 1958. [facility] can[facility].  No Hx DKA/HHS. medtronic insulin pump Novolog U-100 new site this am. Deborah on posterior left arm- expires tomorrow- removed by myself/patient. settings: ISF 1:25 mg/dL, CR 1:10 gms, Basal total 28 units/daily.  Dr Ace, pump to be removed 12 am tonight after Lantus 22 units SQ at HS. multiple injections during hospital stay and may return to pump when stable.       HPI:      PAST MEDICAL & SURGICAL HISTORY:  Lung cancer, upper lobe, left      Heart attack  1990,2005      Stented coronary artery  six cardiac stents      Sleep apnea  uses c pap at home      HTN (hypertension)      High cholesterol      Diabetes  on metformin, humalog insulin pump      Insulin pump in place      Hiatal hernia      Vertigo      Stented coronary artery  Drug eluding x 6 stents      Type 2 diabetes mellitus      TIA (transient ischemic attack)  Jan 2014      Chronic back pain      Spinal stenosis in cervical region      Spinal stenosis of lumbar region      Lumbar herniated disc      OA (osteoarthritis)      CHF (congestive heart failure)      Cellulitis      S/P partial lobectomy of lung  lobectomy of left upper lobe. April 2014      History of throat surgery  1996      S/P angioplasty with stent  2005 and 2006          REVIEW OF SYSTEMS  General:	as above  Respiratory: NAD, No SOB, no cough  Cardiovascular: No chest pain, no palpitations	  Endocrine: no polyuria, no polydipsia, or S/S of hypoglycemia        Allergies    No Known Allergies    Intolerances        MEDICATIONS  (STANDING):  aspirin enteric coated 81 milliGRAM(s) Oral daily  atorvastatin 40 milliGRAM(s) Oral at bedtime  chlorhexidine 2% Cloths 1 Application(s) Topical <User Schedule>  dextrose 5%. 1000 milliLiter(s) (50 mL/Hr) IV Continuous <Continuous>  dextrose 5%. 1000 milliLiter(s) (100 mL/Hr) IV Continuous <Continuous>  dextrose 50% Injectable 25 Gram(s) IV Push once  dextrose 50% Injectable 12.5 Gram(s) IV Push once  dextrose 50% Injectable 25 Gram(s) IV Push once  glucagon  Injectable 1 milliGRAM(s) IntraMuscular once  heparin  Infusion.  Unit(s)/Hr (9.5 mL/Hr) IV Continuous <Continuous>  insulin glargine Injectable (LANTUS) 22 Unit(s) SubCutaneous at bedtime  norepinephrine Infusion 0.05 MICROgram(s)/kG/Min (7.31 mL/Hr) IV Continuous <Continuous>  pantoprazole    Tablet 40 milliGRAM(s) Oral before breakfast  sertraline 50 milliGRAM(s) Oral daily  ticagrelor 60 milliGRAM(s) Oral every 12 hours

## 2022-06-30 NOTE — ED ADULT NURSE NOTE - OBJECTIVE STATEMENT
Received pt c/o mid sternal chest pressure since last night with intermittent sob.   Pt calm and laughing with staff at this time.  Skin warm and dry.  Pt states he was treated in the hospital 1-2 months ago for chest pain , MI and stent replacement.   Pt with +2 pitting edema to BLLE at this time, states this is baseline.    Respirations even and unlabored.  Will continue frequent monitoring. JEFFERSON   1230:   MD aware of pt new onset hypotension, order for levofed noted at present.   JEFFERSON

## 2022-06-30 NOTE — PATIENT PROFILE ADULT - FALL HARM RISK - HARM RISK INTERVENTIONS
DISCHARGE Assistance with ambulation/Assistance OOB with selected safe patient handling equipment/Communicate Risk of Fall with Harm to all staff/Reinforce activity limits and safety measures with patient and family/Tailored Fall Risk Interventions/Visual Cue: Yellow wristband and red socks/Bed in lowest position, wheels locked, appropriate side rails in place/Call bell, personal items and telephone in reach/Instruct patient to call for assistance before getting out of bed or chair/Non-slip footwear when patient is out of bed/[place] to call system/Physically safe environment - no spills, clutter or unnecessary equipment/Purposeful Proactive Rounding/Room/bathroom lighting operational, light cord in reach

## 2022-06-30 NOTE — ED PROVIDER NOTE - OBJECTIVE STATEMENT
Pt is a 76 yo male who presents to the ED with a cc of chest discomfort. PMHx of Cellulitis CHF (congestive heart failure) Chronic back pain Diabetes on metformin, humalog insulin pump Heart attack 1990,2005 Hiatal hernia High cholesterol HTN (hypertension) Insulin pump in place   Lumbar herniated disc Lung cancer, upper lobe, left OA (osteoarthritis) Sleep apnea uses c pap at homeSpinal stenosis in cervical region     Spinal stenosis of lumbar region     Stented coronary artery Drug eluding x 6 stents    Stented coronary artery six cardiac stents    TIA (transient ischemic attack) Jan 2014    Type 2 diabetes mellitus     Vertigo Pt is a 76 yo male who presents to the ED with a cc of chest discomfort. PMHx of Cellulitis CHF (congestive heart failure) Chronic back pain Diabetes on metformin, humalog insulin pump Heart attack 1990,2005 Hiatal hernia High cholesterol HTN (hypertension) Insulin pump in place   Lumbar herniated disc Lung cancer, upper lobe, left OA (osteoarthritis) Sleep apnea uses c pap at home Spinal stenosis in cervical region   Spinal stenosis of lumbar region Stented coronary artery Drug eluding x 6 stents TIA (transient ischemic attack) Jan 2014 Vertigo. Pt reports that he is experiencing substernal chest pain since last night. He reports that the pain has been constant. Feels similar to his previous MIs. Pt reports  that approx 2 months pt had an MI and he reports that he had several stents collapse. Denies fever, chills, N/V, abd pain. Pt does note some tremors, SOB and a non productive.     Cardio: Margarita Pt is a 78 yo male who presents to the ED with a cc of chest discomfort. PMHx of Cellulitis CHF (congestive heart failure) Chronic back pain Diabetes on metformin, Humalog insulin pump Heart attack 1990,2005 Hiatal hernia High cholesterol HTN (hypertension) Insulin pump in place Lumbar herniated disc Lung cancer, upper lobe, left OA (osteoarthritis) Sleep apnea uses c pap at home Spinal stenosis in cervical region Spinal stenosis of lumbar region Stented coronary artery Drug eluding x 6 stents TIA (transient ischemic attack) Jan 2014 Vertigo. Pt reports that he is experiencing substernal chest pain since last night. He reports that the pain has been constant. Feels similar to his previous MIs. Pt reports  that approx 2 months pt had an MI and he reports that he had several stents collapse. Denies fever, chills, N/V, abd pain. Pt does note some tremors, SOB and a non productive.     Cardio: Margarita

## 2022-06-30 NOTE — H&P ADULT - ASSESSMENT
BEKAH AMOR is a 78 yo male brought to the ED because of chest discomfort. Patient reports that he is experiencing substernal chest pain since last night. He reports that the pain has been constant. Feels similar to his previous MIs. Pt reports  that approx 2 months pt had an MI and he reports that he had several stents collapse.

## 2022-06-30 NOTE — CONSULT NOTE ADULT - SUBJECTIVE AND OBJECTIVE BOX
Patient is a 77y old  Male who presents with a chief complaint of chest discomfort.     BRIEF HOSPITAL COURSE: Patient is a 78 yo male with pmh of cellulitis, CHD, Chronic back pain, diabetes (on metformin), MI in  and , Hiatal hernia, HLD, HTN, Lung CA, sleep apnea CAD with s/p 6 stents, TIA and known cardiomyopathy (on Entresto at home) who presented to [facility] ED with chest pain and SOB. Patient originally with epigastric pain starting 1.5 days ago that developed to lower chest pain. Patient started noticing b/l arm tingling. Symptoms resolved, but came back 12 hours later prompting him to come to ED. Of note patient had MI x2 months ago requiring in-stent restenosis. Patients primary cardiology is at [place]. Cardiology consulted in ED for possible NSTEMI. Patient deemed not a candidate for intervention at this time given cardiac history, and was started on heparin gtt. Patient was also noted to be hypotensive requiring vasopressor therapy given CHF and volume overload.  ICU was consulted for further management and admission.  Upon assessment patient is very [place], though states his chest pain has resolved since BP improved. Patient remains on low dose levophed and heparin gtt. Patient to be admitted to ICU.    Events last 24 hours: Arrived to ED with chest pain and SOB, ? NSTEMI, shock -> on levophed, likely cardiac in nature. Being admitted to ICU.    PAST MEDICAL & SURGICAL HISTORY:  Lung cancer, upper lobe, left  Heart attack  ,  Stented coronary artery  six cardiac stents  Sleep apnea  uses c pap at home  HTN (hypertension)  High cholesterol  Diabetes  on metformin, humalog insulin pump  Insulin pump in place  Hiatal hernia  Vertigo  Stented coronary artery  Drug eluding x 6 stents  Type 2 diabetes mellitus  TIA (transient ischemic attack)  2014  Chronic back pain  Spinal stenosis in cervical region  Spinal stenosis of lumbar region  Lumbar herniated disc  OA (osteoarthritis)  CHF (congestive heart failure)  Cellulitis  S/P partial lobectomy of lung  lobectomy of left upper lobe. 2014  History of throat surgery  1996  S/P angioplasty with stent   and       Review of Systems:  NEURO: no headaches, no confusion  CV: no chest pain, palpitations, murmurs, orthopnea; prior chest pain (resolved)  Resp: no shortness of breath (resolved), no cough, wheeze, sputum production  GI: no stomach pain, nausea, vomiting flatulence  MSK: no joint pain, no joint swelling, no pain with ext movement.   PV: lower ext at baseline      Medications:  norepinephrine Infusion 0.05 MICROgram(s)/kG/Min IV Continuous <Continuous>  heparin   Injectable 4700 Unit(s) IV Push every 6 hours PRN  heparin  Infusion.  Unit(s)/Hr IV Continuous <Continuous>  chlorhexidine 2% Cloths 1 Application(s) Topical <User Schedule>      ICU Vital Signs Last 24 Hrs  T(C): 36.7 (2022 16:03), Max: 36.7 (2022 08:40)  T(F): 98.1 (2022 16:03), Max: 98.1 (2022 16:03)  HR: 82 (2022 15:37) (80 - 97)  BP: 98/55 (2022 15:37) (73/51 - 109/55)  BP(mean): --  ABP: --  ABP(mean): --  RR: 18 (2022 15:37) (16 - 18)  SpO2: 96% (2022 15:37) (84% - 96%)    I&O's Detail    LABS:                        11.1   9.24  )-----------( 174      ( 2022 09:20 )             36.1     06-30    136  |  99  |  46<H>  ----------------------------<  258<H>  4.2   |  26  |  1.90<H>    Ca    9.6      2022 09:20  Phos  2.7     06-30  Mg     2.6     06-30    TPro  7.1  /  Alb  3.8  /  TBili  0.9  /  DBili  x   /  AST  41<H>  /  ALT  82<H>  /  AlkPhos  108  06-30    CARDIAC MARKERS ( 2022 09:20 )  x     / x     / 104 U/L / x     / 6.6 ng/mL    CAPILLARY BLOOD GLUCOSE    PT/INR - ( 2022 09:20 )   PT: 13.1 sec;   INR: 1.12 ratio         PTT - ( 2022 09:20 )  PTT:32.1 sec  Urinalysis Basic - ( 2022 10:00 )    Color: Yellow / Appearance: Clear / S.010 / pH: x  Gluc: x / Ketone: Negative  / Bili: Negative / Urobili: Negative   Blood: x / Protein: Negative / Nitrite: Negative   Leuk Esterase: Negative / RBC: x / WBC x   Sq Epi: x / Non Sq Epi: x / Bacteria: x    CULTURES:    Physical Examination:    General: No acute distress.  Alert, oriented, interactive, nonfocal    HEENT: Pupils equal, reactive to light.  Symmetric.    PULM: Diminished b/l lower breath sounds. Chest expansion symmetrical. RRR.    CVS: Regular rate and rhythm, no murmurs, rubs, or gallops. Hypotensive on levophed    ABD: Soft, nondistended, nontender, normoactive bowel sounds, no masses. Insulin pump in place.    EXT: +2 edema, nontender    SKIN: Cool, dry, no rashes noted.    NEURO: A&Ox3, moving all ext. Following commands.      RADIOLOGY: < from: CT Chest No Cont (22 @ 12:16) >  INTERPRETATION:  CLINICAL INFORMATION: Abnormal chest x-ray    COMPARISON: None.    CONTRAST/COMPLICATIONS:  IV Contrast: NONE  Oral Contrast: NONE  Complications: None reported at time of study completion    PROCEDURE:  CT of the Chest was performed.  Sagittal and coronal reformats were performed.    FINDINGS:    LUNGS AND AIRWAYS: Patent central airways.  Prominent subpleural   intralobular septa suggesting interstitial edema. Airspace disease   throughout the right lung may represent pulmonary edema.   Infection/inflammation not excluded.  PLEURA: Small bilateral layering pleural effusions..  MEDIASTINUM AND LINDSAY: No lymphadenopathy.  VESSELS: Within normal limits.  HEART: Heart size is normal. Coronary artery stent.  cardiac pacer. No   pericardial effusion.  CHEST WALL AND LOWER NECK: Within normal limits.  VISUALIZED UPPER ABDOMEN: Small hiatal hernia.  BONES: Degenerative change.    IMPRESSION:  Interstitial edema and bilateral pleural effusions suggest an element of   CHF/fluid overload.  Airspace disease throughout right lung may represent pulmonary edema.   Inflammation/infection not excluded.    CRITICAL CARE TIME SPENT: 41 minutes  Evaluating/treating patient, reviewing data/labs/imaging, discussing case with multidisciplinary team, discussing plan/goals of care with patient/family. Non-inclusive of procedure time.

## 2022-06-30 NOTE — CONSULT NOTE ADULT - ASSESSMENT
Patient is a 76 yo male with pmh of cellulitis, CHD, Chronic back pain, diabetes (on metformin), MI in 1990 and 2005, Hiatal hernia, HLD, HTN, Lung CA, sleep apnea CAD with s/p 6 stents, TIA and known cardiomyopathy who is being admitted with NSTEMI, shock and CHF exacerbation.     Problem:  - Shock (likely cardiogenic)  - NSTEMI  - Systolic HF exacerbation  - Pulmonary edema  - Lactic acidosis   - JIMENEZ (unknown baseline)    Plan:  Neuro: No active issues. Will c/w home sertraline given concern for WD.  Respiratory: SOB with pulmonary edema, now feeling improved. CT showing intersitial edema and b/l pleural effusions, likely related to CHF exacerbation. Oxygenating well on NC, titrate to maintain spo2 >90%.  Cardiac: Pending POCUS with attending. In shock requiring vasopressor administration. Actively titrating to maintain MAP >65 for adequate tissue perfusion. Cardiology following, no need at this time to transfer for intervention. Started on Heparin gtt for possible NSTEMI. Trend cardiac markers to evaluated for further ischemia. Patient states to have improved chest pain with now normotensive BP. Was given 20 of lasix in ED with success, low threshold to give gentle diuresis if needed given CHF exacerbation. Will hold coreg at this time given hypotension. C/w home statin.   GI: NPO except medications at this time during initial work up, can transition to cardiac diet once cleared. Will add home PPI.   /Renal: was given 1850 IVF in ED. Strict I&O via Bryant catheter. Creatine elevated, 1.9, continue to trend. Goal UO 0.5-1.5cc/kg/hr. Trend electrolytes and replace as needed. Goal K >4, mag >2. Avoid nephrotoxic agents.  ID: No active s/s of infection or role for abx at this time. Trend CBC, assess for fevers.   Hem/Onc: On heparin gtt given possible NSTEMI. Monitor for s/s of bleeding. Transfuse if hgb <7, trend cbc. C/w with home Brilinta and aspirin give cardiac hx and stents.    Endo: Patient is noted to have self insulin pump. Diabetic educator evaluating, will not continue with insulin pump while in unit. Will evaluate basal rate needed.   Dispo: Full code, ICU.    Son updated at bedside in ED.    Case discussed with Dr. Ophelia Ace, ICU attending.

## 2022-06-30 NOTE — CONSULT NOTE ADULT - NS PANP COMMENT GEN_ALL_CORE FT
78 yo man with Hx CAD with 6 stents, HFrEF, BiVICD, most recent MI 2mos ago, presenting with CP and dyspnea.  Found to be in shock with JIMENEZ and lactic acidosis.  CP resolved with levophed and improvement in BP.      --admit to ICU  --mentating well  --cardiogenic shock with volume overload  on small dose of levophed 0.04mcg, wean as tolerates  s/p lasix in ED, may redose, possible laboy  --UA/ACS, continue home ASA, brilinta, statin, start heparin gtt  --afib controlled on digoxin  --JIMENEZ v CKD, will likely improved with improved hemodynamics  --lactic acidosis improving  --DM2, remove insulin pump this evening and transition to lantus  --plan discussed with son

## 2022-06-30 NOTE — CONSULT NOTE ADULT - SUBJECTIVE AND OBJECTIVE BOX
History of Present Illness: The patient is a 77 year old male with a history of DM, CAD with prior MI s/p multiple PCI, chronic systolic heart failure s/p CRT-D who presents with chest pain and shortness of breath. He states about 1.5 days ago he had the development of epigastric and lower chest pressure. There was some associated bilateral arm tingling. Symptoms persisted for about 12 hours then resolved. However, they returned this morning. He has also felt short of breath. He states he had his last MI about 2 months ago and had in-stent restenosis requiring PCI. He was told he is not a candidate for CABG and likely not for repeat PCI.    Past Medical/Surgical History:  DM, CAD with prior MI s/p multiple PCI, chronic systolic heart failure s/p CRT-D    Medications:  Aspirin 81 mg daily  Ticagrelor 90 mg bid  Entresto 24-26 mg bid  Carvedilol 3.125 mg bid  Spironolactone 25 mg daily  Jardiance 10 mg daily      Family History: Non-contributory family history of premature cardiovascular atherosclerotic disease    Social History: No tobacco, alcohol or drug use    Review of Systems:  General: No fevers, chills, weight gain  Skin: No rashes, color changes  Cardiovascular: No chest pain, orthopnea  Respiratory: No shortness of breath, cough  Gastrointestinal: No nausea, abdominal pain  Genitourinary: No incontinence, pain with urination  Musculoskeletal: No pain, swelling, decreased range of motion  Neurological: No headache, weakness  Psychiatric: No depression, anxiety  Endocrine: No weight gain, increased thirst  All other systems are comprehensively negative.    Physical Exam:  Vitals:        Vital Signs Last 24 Hrs  T(C): 36.7 (30 Jun 2022 08:40), Max: 36.7 (30 Jun 2022 08:40)  T(F): 98 (30 Jun 2022 08:40), Max: 98 (30 Jun 2022 08:40)  HR: 91 (30 Jun 2022 11:00) (90 - 97)  BP: 107/68 (30 Jun 2022 11:00) (76/44 - 107/68)  BP(mean): --  RR: 18 (30 Jun 2022 11:00) (16 - 18)  SpO2: 96% (30 Jun 2022 11:00) (84% - 96%)  General: NAD  HEENT: MMM  Neck: No JVD, no carotid bruit  Lungs: Rales bilaterally  CV: RRR, nl S1/S2, no M/R/G  Abdomen: S/NT/ND, +BS  Extremities: 1+ LE edema, no cyanosis  Neuro: AAOx3, non-focal  Skin: No rash    Labs:                        11.1   9.24  )-----------( 174      ( 30 Jun 2022 09:20 )             36.1     06-30    136  |  99  |  46<H>  ----------------------------<  258<H>  4.2   |  26  |  1.90<H>    Ca    9.6      30 Jun 2022 09:20  Phos  2.7     06-30  Mg     2.6     06-30    TPro  7.1  /  Alb  3.8  /  TBili  0.9  /  DBili  x   /  AST  41<H>  /  ALT  82<H>  /  AlkPhos  108  06-30    CARDIAC MARKERS ( 30 Jun 2022 09:20 )  x     / x     / 104 U/L / x     / 6.6 ng/mL      PT/INR - ( 30 Jun 2022 09:20 )   PT: 13.1 sec;   INR: 1.12 ratio         PTT - ( 30 Jun 2022 09:20 )  PTT:32.1 sec    ECG: Atrial and biventricular paced rhythm

## 2022-06-30 NOTE — CONSULT NOTE ADULT - ASSESSMENT
Physical Exam:   Vital Signs Last 24 Hrs  T(C): 36.7 (30 Jun 2022 16:03), Max: 36.7 (30 Jun 2022 08:40)  T(F): 98.1 (30 Jun 2022 16:03), Max: 98.1 (30 Jun 2022 16:03)  HR: 84 (30 Jun 2022 16:00) (80 - 97)  BP: 106/57 (30 Jun 2022 16:00) (73/51 - 109/55)  BP(mean): 71 (30 Jun 2022 16:00) (71 - 71)  RR: 23 (30 Jun 2022 16:00) (16 - 23)  SpO2: 90% (30 Jun 2022 16:00) (84% - 96%)    General: NAD, denies Fever, chills  CVS: S1S2 no M/R/G  Resp: CTA in all fields         CAPILLARY BLOOD GLUCOSE      POCT Blood Glucose.: 174 mg/dL (30 Jun 2022 16:25)      Cholesterol, Serum: 113 mg/dL (05.19.21 @ 08:36)     HDL Cholesterol, Serum: 22 mg/dL (05.19.21 @ 08:36)     LDL Cholesterol Calculated: 66 mg/dL (05.19.21 @ 08:36)     DIET: CC  >50%

## 2022-06-30 NOTE — ED PROVIDER NOTE - CLINICAL SUMMARY MEDICAL DECISION MAKING FREE TEXT BOX
Pt is a 78 yo male who presents to the ED with a cc of chest discomfort. PMHx of Cellulitis CHF (congestive heart failure) Chronic back pain Diabetes on metformin, Humalog insulin pump Heart attack 1990,2005 Hiatal hernia High cholesterol HTN (hypertension) Insulin pump in place Lumbar herniated disc Lung cancer, upper lobe, left OA (osteoarthritis) Sleep apnea uses c pap at home Spinal stenosis in cervical region Spinal stenosis of lumbar region Stented coronary artery Drug eluding x 6 stents TIA (transient ischemic attack) Jan 2014 Vertigo. Pt reports that he is experiencing substernal chest pain since last night. He reports that the pain has been constant. Feels similar to his previous MIs. Pt reports  that approx 2 months pt had an MI and he reports that he had several stents collapse. Denies fever, chills, N/V, abd pain. Pt does note some tremors, SOB and a non productive. Pt is a 76 yo male who presents to the ED with a cc of chest discomfort. PMHx of Cellulitis CHF (congestive heart failure) Chronic back pain Diabetes on metformin, Humalog insulin pump Heart attack 1990,2005 Hiatal hernia High cholesterol HTN (hypertension) Insulin pump in place Lumbar herniated disc Lung cancer, upper lobe, left OA (osteoarthritis) Sleep apnea uses c pap at home Spinal stenosis in cervical region Spinal stenosis of lumbar region Stented coronary artery Drug eluding x 6 stents TIA (transient ischemic attack) Jan 2014 Vertigo. Pt reports that he is experiencing substernal chest pain since last night. He reports that the pain has been constant. Feels similar to his previous MIs. Pt reports  that approx 2 months pt had an MI and he reports that he had several stents collapse. Denies fever, chills, N/V, abd pain. Pt does note some tremors, SOB and a non productive. Pt presenting to the ED with CP and SOB extensive underlying cardiac pathology concern for possible MI, vs volume overload vs decompensation heart failure. Will obtain screening labs, chest x-ray, EKG, chest cardiac markers and will monitor

## 2022-06-30 NOTE — H&P ADULT - NSHPLABSRESULTS_GEN_ALL_CORE
11.3   10.96 )-----------( 183      ( 2022 18:26 )             38.2     2022 09:20    136    |  99     |  46     ----------------------------<  258    4.2     |  26     |  1.90     Ca    9.6        2022 09:20  Phos  2.7       2022 09:20  Mg     2.6       2022 09:20    TPro  7.1    /  Alb  3.8    /  TBili  0.9    /  DBili  x      /  AST  41     /  ALT  82     /  AlkPhos  108    2022 09:20    LIVER FUNCTIONS - ( 2022 09:20 )  Alb: 3.8 g/dL / Pro: 7.1 g/dL / ALK PHOS: 108 U/L / ALT: 82 U/L / AST: 41 U/L / GGT: x           PT/INR - ( 2022 09:20 )   PT: 13.1 sec;   INR: 1.12 ratio         PTT - ( 2022 18:26 )  PTT:58.8 sec  CAPILLARY BLOOD GLUCOSE    POCT Blood Glucose.: 174 mg/dL (2022 16:25)    CARDIAC MARKERS ( 2022 09:20 )  x     / x     / 104 U/L / x     / 6.6 ng/mL    Urinalysis Basic - ( 2022 10:00 )    Color: Yellow / Appearance: Clear / S.010 / pH: x  Gluc: x / Ketone: Negative  / Bili: Negative / Urobili: Negative   Blood: x / Protein: Negative / Nitrite: Negative   Leuk Esterase: Negative / RBC: x / WBC x   Sq Epi: x / Non Sq Epi: x / Bacteria: x

## 2022-06-30 NOTE — ED ADULT TRIAGE NOTE - CHIEF COMPLAINT QUOTE
went to see cardiologist 2 days ago for acid reflux, c/o chest discomfort *  2 days- radiating down right arm

## 2022-07-01 NOTE — PROGRESS NOTE ADULT - SUBJECTIVE AND OBJECTIVE BOX
Patient is a 77y old  Male who presents with a chief complaint of Substernal chest pain since last night (2022 14:46)      INTERVAL /OVERNIGHT EVENTS: feels better    MEDICATIONS  (STANDING):  aspirin enteric coated 81 milliGRAM(s) Oral daily  atorvastatin 40 milliGRAM(s) Oral at bedtime  chlorhexidine 2% Cloths 1 Application(s) Topical <User Schedule>  dextrose 5%. 1000 milliLiter(s) (50 mL/Hr) IV Continuous <Continuous>  dextrose 5%. 1000 milliLiter(s) (100 mL/Hr) IV Continuous <Continuous>  dextrose 50% Injectable 25 Gram(s) IV Push once  dextrose 50% Injectable 12.5 Gram(s) IV Push once  dextrose 50% Injectable 25 Gram(s) IV Push once  furosemide   Injectable 40 milliGRAM(s) IV Push daily  glucagon  Injectable 1 milliGRAM(s) IntraMuscular once  heparin  Infusion. 1200 Unit(s)/Hr (12 mL/Hr) IV Continuous <Continuous>  insulin glargine Injectable (LANTUS) 26 Unit(s) SubCutaneous at bedtime  insulin lispro (ADMELOG) corrective regimen sliding scale   SubCutaneous three times a day before meals  insulin lispro Injectable (ADMELOG) 4 Unit(s) SubCutaneous three times a day before meals  pantoprazole    Tablet 40 milliGRAM(s) Oral before breakfast  sertraline 50 milliGRAM(s) Oral daily  ticagrelor 90 milliGRAM(s) Oral every 12 hours    MEDICATIONS  (PRN):  acetaminophen     Tablet .. 650 milliGRAM(s) Oral every 6 hours PRN Temp greater or equal to 38C (100.4F), Mild Pain (1 - 3)  aluminum hydroxide/magnesium hydroxide/simethicone Suspension 30 milliLiter(s) Oral every 6 hours PRN Dyspepsia  dextrose Oral Gel 15 Gram(s) Oral once PRN Blood Glucose LESS THAN 70 milliGRAM(s)/deciliter  heparin   Injectable 6500 Unit(s) IV Push every 6 hours PRN For aPTT less than 40  heparin   Injectable 3000 Unit(s) IV Push every 6 hours PRN For aPTT between 40 - 57      Allergies    No Known Allergies    Intolerances        REVIEW OF SYSTEMS:  CONSTITUTIONAL: No fever, weight loss, or fatigue  EYES: No eye pain, visual disturbances, or discharge  ENMT:  No difficulty hearing, tinnitus, vertigo; No sinus or throat pain  NECK: No pain or stiffness  RESPIRATORY: No cough, wheezing, chills or hemoptysis; No shortness of breath  CARDIOVASCULAR: No chest pain, palpitations, dizziness, or leg swelling  GASTROINTESTINAL: No abdominal or epigastric pain. No nausea, vomiting, or hematemesis; No diarrhea or constipation. No melena or hematochezia.  GENITOURINARY: No dysuria, frequency, hematuria, or incontinence  NEUROLOGICAL: No headaches, memory loss, loss of strength, numbness, or tremors  SKIN: No itching, burning, rashes, or lesions   LYMPH NODES: No enlarged glands  ENDOCRINE: No heat or cold intolerance; No hair loss; No polydipsia or polyuria  MUSCULOSKELETAL: No joint pain or swelling; No muscle, back, or extremity pain  PSYCHIATRIC: No depression, anxiety, mood swings, or difficulty sleeping  HEME/LYMPH: No easy bruising, or bleeding gums  ALLERGY AND IMMUNOLOGIC: No hives or eczema    Vital Signs Last 24 Hrs  T(C): 36.3 (2022 12:08), Max: 36.8 (2022 23:53)  T(F): 97.3 (2022 12:08), Max: 98.2 (2022 23:53)  HR: 88 (2022 14:00) (81 - 99)  BP: 97/62 (2022 14:00) (76/50 - 131/65)  BP(mean): 72 (2022 14:00) (58 - 90)  RR: 28 (2022 14:00) (20 - 45)  SpO2: 99% (2022 14:00) (83% - 100%)    PHYSICAL EXAM:  GENERAL: NAD, well-groomed, well-developed  HEAD:  Atraumatic, Normocephalic  EYES: EOMI, PERRLA, conjunctiva and sclera clear  ENMT: No tonsillar erythema, exudates, or enlargement; Moist mucous membranes, Good dentition, No lesions  NECK: Supple, No JVD, Normal thyroid  NERVOUS SYSTEM:  Alert & Oriented X3, Good concentration; Motor Strength 5/5 B/L upper and lower extremities; DTRs 2+ intact and symmetric  CHEST/LUNG: Clear to auscultation bilaterally; No rales, rhonchi, wheezing, or rubs  HEART: Regular rate and rhythm; No murmurs, rubs, or gallops  ABDOMEN: Soft, Nontender, Nondistended; Bowel sounds present  EXTREMITIES:  2+ Peripheral Pulses, No clubbing, cyanosis, or edema  LYMPH: No lymphadenopathy noted  SKIN: No rashes or lesions    LABS:                        11.5   14.88 )-----------( 184      ( 2022 09:25 )             38.7     2022 04:30    141    |  104    |  43     ----------------------------<  196    4.2     |  27     |  1.70     Ca    9.2        2022 04:30  Phos  4.1       2022 04:30  Mg     2.4       2022 04:30    TPro  7.2    /  Alb  3.5    /  TBili  1.1    /  DBili  x      /  AST  36     /  ALT  69     /  AlkPhos  87     2022 04:30    PT/INR - ( 2022 09:20 )   PT: 13.1 sec;   INR: 1.12 ratio         PTT - ( 2022 15:51 )  PTT:62.8 sec  Urinalysis Basic - ( 2022 10:00 )    Color: Yellow / Appearance: Clear / S.010 / pH: x  Gluc: x / Ketone: Negative  / Bili: Negative / Urobili: Negative   Blood: x / Protein: Negative / Nitrite: Negative   Leuk Esterase: Negative / RBC: x / WBC x   Sq Epi: x / Non Sq Epi: x / Bacteria: x      CAPILLARY BLOOD GLUCOSE      POCT Blood Glucose.: 330 mg/dL (2022 11:13)  POCT Blood Glucose.: 226 mg/dL (2022 08:09)  POCT Blood Glucose.: 183 mg/dL (2022 05:26)  POCT Blood Glucose.: 231 mg/dL (2022 21:41)      RADIOLOGY & ADDITIONAL TESTS:    Notes Reviewed:  [x ] YES  [ ] NO    Care Discussed with Consultants/Other Providers [x ] YES  [ ] NO

## 2022-07-01 NOTE — PROGRESS NOTE ADULT - ASSESSMENT
Physical Exam:   Vital Signs Last 24 Hrs  T(C): 36.3 (01 Jul 2022 12:08), Max: 36.8 (30 Jun 2022 23:53)  T(F): 97.3 (01 Jul 2022 12:08), Max: 98.2 (30 Jun 2022 23:53)  HR: 88 (01 Jul 2022 14:00) (81 - 99)  BP: 97/62 (01 Jul 2022 14:00) (76/50 - 131/65)  BP(mean): 72 (01 Jul 2022 14:00) (58 - 90)  RR: 28 (01 Jul 2022 14:00) (18 - 45)  SpO2: 99% (01 Jul 2022 14:00) (83% - 100%)    General: NAD, denies Fever, chills  CVS: S1S2 no M/R/G  Resp: CTA in all fields  : no freq, no urgency, no dysuria           CAPILLARY BLOOD GLUCOSE      POCT Blood Glucose.: 330 mg/dL (01 Jul 2022 11:13)  POCT Blood Glucose.: 226 mg/dL (01 Jul 2022 08:09)  POCT Blood Glucose.: 183 mg/dL (01 Jul 2022 05:26)  POCT Blood Glucose.: 231 mg/dL (30 Jun 2022 21:41)  POCT Blood Glucose.: 174 mg/dL (30 Jun 2022 16:25)      Cholesterol, Serum: 113 mg/dL (05.19.21 @ 08:36)     HDL Cholesterol, Serum: 22 mg/dL (05.19.21 @ 08:36)     LDL Cholesterol Calculated: 66 mg/dL (05.19.21 @ 08:36)     DIET: CC  >50%

## 2022-07-01 NOTE — PROGRESS NOTE ADULT - ASSESSMENT
The patient is a 77 year old male with a history of DM, CAD with prior MI s/p multiple PCI, chronic systolic heart failure s/p CRT-D who presents with chest pain and shortness of breath in the setting of acute systolic heart failure, cardiogenic shock, possible NSTEMI.    Plan:  - The patient has a history of multiple PCI including intervention on ISR and is not a candidate for CABG or future PCI  - Troponin peaked at 1360 in the setting of possible NSTEMI vs. heart failure/shock. No need to trend.  - Echo pending  - Continue aspirin 81 mg daily  - Continue ticagrelor 90 mg bid  - Continue heparin drip for duration of 48 hours (discontinue 7/2)  - Hold Entresto, spironolactone, carvedilol due to hypotension/shock  - Would consider continuing diuresis with furosemide given heart failure symptoms and presence of pulm edema on imaging  - Continue norepinephrine and wean off as tolerated - the patient's baseline blood pressure is systolics 80s-90s  - For now, hold off starting dobutamine but can consider if ongoing shock remains an issue  - The patient is not a candidate for advanced heart failure therapies  - [facility] discussions  - ICU care The patient is a 77 year old male with a history of DM, CAD with prior MI s/p multiple PCI, chronic systolic heart failure s/p CRT-D who presents with chest pain and shortness of breath in the setting of acute systolic heart failure, cardiogenic shock, possible NSTEMI.    Plan:  - The patient has a history of multiple PCI including intervention on ISR and is not a candidate for CABG or future PCI  - Troponin peaked at 1360 in the setting of possible NSTEMI vs. heart failure/shock. No need to trend.  - Echo pending  - Continue aspirin 81 mg daily  - Continue ticagrelor 90 mg bid  - Continue heparin drip for duration of 48 hours (discontinue 7/2)  - Hold Entresto, spironolactone, carvedilol due to hypotension/shock  - Would consider continuing diuresis with furosemide given heart failure symptoms and presence of pulm edema on imaging  - Continue norepinephrine and wean off as tolerated - the patient's baseline blood pressure is systolics 80s-90s  - For now, hold off starting dobutamine but can consider if ongoing shock remains an issue  - The patient is not a candidate for advanced heart failure therapies  - [facility] discussions  - ICU care      31 minutes of critical care time spent with the patient and coordinating care with nursing, hospitalist, and consultants. Patient is critically ill requiring ICU care. The patient is high risk for deterioration and death.

## 2022-07-01 NOTE — PROGRESS NOTE ADULT - SUBJECTIVE AND OBJECTIVE BOX
Interval events:   levo weaned off at 8am    Review of Systems:  dyspnea improved  +CP substernal this am  no nausea/vomiting    T(F): 98.6 (22 @ 20:17), Max: 98.6 (22 @ 20:17)  HR: 98 (22 @ 20:17) (81 - 98)  BP: 134/74 (22 @ 20:17) (76/50 - 134/74)  RR: 20 (22 @ 20:17) (20 - 41)  SpO2: 92% (22 @ 20:17) (87% - 100%)  Wt(kg): --      22 @ 07:01  -  22 @ 07:00  --------------------------------------------------------  IN: 236.2 mL / OUT: 2000 mL / NET: -1763.8 mL    22 @ 07:01  -  22 @ 22:08  --------------------------------------------------------  IN: 492 mL / OUT: 1050 mL / NET: -558 mL        CAPILLARY BLOOD GLUCOSE      POCT Blood Glucose.: 267 mg/dL (2022 17:38)      I&O's Summary    2022 07:  -  2022 07:00  --------------------------------------------------------  IN: 236.2 mL / OUT: 2000 mL / NET: -1763.8 mL    2022 07:  -  2022 22:08  --------------------------------------------------------  IN: 492 mL / OUT: 1050 mL / NET: -558 mL        Physical Exam:   Gen: obese M, NAD  Neuro: alert, answers questions appropriately  Resp:  CTABL  CVS: RRR  Abd: obese, soft, NTND  Ext:1+ edema b/l  Skin: WWP    Meds:    furosemide   Injectable IV Push    atorvastatin Oral  dextrose 50% Injectable IV Push  dextrose 50% Injectable IV Push  dextrose 50% Injectable IV Push  dextrose Oral Gel Oral PRN  glucagon  Injectable IntraMuscular  insulin glargine Injectable (LANTUS) SubCutaneous  insulin lispro (ADMELOG) corrective regimen sliding scale SubCutaneous  insulin lispro Injectable (ADMELOG) SubCutaneous      acetaminophen     Tablet .. Oral PRN  sertraline Oral      aspirin enteric coated Oral  heparin   Injectable IV Push PRN  heparin   Injectable IV Push PRN  heparin  Infusion. IV Continuous  ticagrelor Oral    aluminum hydroxide/magnesium hydroxide/simethicone Suspension Oral PRN  pantoprazole    Tablet Oral      dextrose 5%. IV Continuous  dextrose 5%. IV Continuous      chlorhexidine 2% Cloths Topical                              11.5   14.88 )-----------( 184      ( 2022 09:25 )             38.7       07-01    141  |  104  |  43<H>  ----------------------------<  196<H>  4.2   |  27  |  1.70<H>    Ca    9.2      2022 04:30  Phos  4.1     07-  Mg     2.4     07-    TPro  7.2  /  Alb  3.5  /  TBili  1.1  /  DBili  x   /  AST  36  /  ALT  69  /  AlkPhos  87  07-01    Lactate 1.9           07-01 @ 04:30    Lactate 2.1           07-01 @ 00:48      CARDIAC MARKERS ( 2022 09:20 )  x     / x     / 104 U/L / x     / 6.6 ng/mL      PT/INR - ( 2022 09:20 )   PT: 13.1 sec;   INR: 1.12 ratio         PTT - ( 2022 15:51 )  PTT:62.8 sec  Urinalysis Basic - ( 2022 10:00 )    Color: Yellow / Appearance: Clear / S.010 / pH: x  Gluc: x / Ketone: Negative  / Bili: Negative / Urobili: Negative   Blood: x / Protein: Negative / Nitrite: Negative   Leuk Esterase: Negative / RBC: x / WBC x   Sq Epi: x / Non Sq Epi: x / Bacteria: x      Clean Catch Clean Catch (Midstream)   <10,000 CFU/mL Normal Urogenital Janiya --  @ 10:00  .Blood Blood-Peripheral   No growth to date. --  @ 09:15  .Blood Blood-Peripheral   No growth to date. --  @ 09:05        Radiology:  < from: CT Chest No Cont (22 @ 12:16) >  FINDINGS:    LUNGS AND AIRWAYS: Patent central airways.  Prominent subpleural   intralobular septa suggesting interstitial edema. Airspace disease   throughout the right lung may represent pulmonary edema.   Infection/inflammation not excluded.  PLEURA: Small bilateral layering pleural effusions..  MEDIASTINUM AND LINDSAY: No lymphadenopathy.  VESSELS: Within normal limits.  HEART: Heart size is normal. Coronary artery stent.  cardiac pacer. No   pericardial effusion.  CHEST WALL AND LOWER NECK: Within normal limits.  VISUALIZED UPPER ABDOMEN: Small hiatal hernia.  BONES: Degenerative change.    IMPRESSION:  Interstitial edema and bilateral pleural effusions suggest an element of   CHF/fluid overload.  Airspace disease throughout right lung may represent pulmonary edema.   Inflammation/infection not excluded.      < end of copied text >      Bedside ultrasound:  B lines b/l, IVC without variation, size range 2 to 2.4cm, reduced LV function    Tubes/lines: laboy      GLOBAL ISSUE/BEST PRACTICE:  Analgesia: NA  Sedation: NA  HOB elevation: yes  Stress ulcer prophylaxis: Y  VTE prophylaxis: AC  Glycemic control: Y  Nutrition: Y    CODE STATUS: FULL

## 2022-07-01 NOTE — CONSULT NOTE ADULT - SUBJECTIVE AND OBJECTIVE BOX
Patient is a 77y old  Male who presents with a chief complaint of Substernal chest pain since last night (2022 19:45)    HPI:  BEKAH AMOR is a 78 yo male brought to the ED because of chest discomfort. Patient reports that he is experiencing substernal chest pain since last night. He reports that the pain has been constant. Feels similar to his previous MIs. Pt reports  that approx 2 months pt had an MI and he reports that he had several stents collapse. Denies fever, chills, N/V, abd pain. Pt does note some tremors, SOB and a non productive.   PMH of Cellulitis CHF (congestive heart failure) Chronic back pain Diabetes on metformin, Humalog insulin pump Heart attack , Hiatal hernia High cholesterol HTN (hypertension) Insulin pump in place Lumbar herniated disc Lung cancer, upper lobe, left OA (osteoarthritis) Sleep apnea uses c pap at home Spinal stenosis in cervical region Spinal stenosis of lumbar region Stented coronary artery Drug eluding x 6 stents TIA (transient ischemic attack) 2014 Vertigo.    (2022 19:45)      PAST MEDICAL HISTORY:  Lung cancer, upper lobe, left    Heart attack    Stented coronary artery    Sleep apnea    HTN (hypertension)    High cholesterol    Diabetes    Insulin pump in place    Hiatal hernia    Vertigo    Stented coronary artery    Type 2 diabetes mellitus    TIA (transient ischemic attack)    Chronic back pain    Spinal stenosis in cervical region    Spinal stenosis of lumbar region    Lumbar herniated disc    OA (osteoarthritis)    CHF (congestive heart failure)    Cellulitis        PAST SURGICAL HISTORY:  S/P partial lobectomy of lung    History of throat surgery    S/P angioplasty with stent        FAMILY HISTORY:  No pertinent family history in first degree relatives        SOCIAL HISTORY:    Allergies    No Known Allergies    Intolerances      Home Medications:  aspirin 81 mg oral delayed release tablet: 1 tab(s) orally once a day (2022 15:14)  atorvastatin 40 mg oral tablet: 1 tab(s) orally once a day (2022 15:14)  carvedilol 3.125 mg oral tablet: 1 tab(s) orally 2 times a day (2022 15:14)  digoxin 125 mcg (0.125 mg) oral tablet: 1 tab(s) orally every other day (2022 15:14)  Entresto 24 mg-26 mg oral tablet: 0.5 tab(s) orally 2 times a day (2022 15:14)  gabapentin 100 mg oral capsule: 6 cap(s) orally once a day (in the morning) (2022 15:14)  gabapentin 300 mg oral capsule: 1 cap(s) orally once a day (in the evening) (2022 15:14)  insulin aspart 100 units/mL injectable solution: uses via insulin pump (2022 15:14)  Jardiance 25 mg oral tablet: 0.5 tab(s) orally once a day (in the morning) (2022 15:14)  metFORMIN 500 mg oral tablet: 2 tab(s) orally 2 times a day (2022 15:14)  pantoprazole 40 mg oral delayed release tablet: 1 tab(s) orally once a day (2022 15:14)  sertraline 50 mg oral tablet: 1 tab(s) orally once a day (2022 15:14)  spironolactone 25 mg oral tablet: 0.5 tab(s) orally once a day (2022 15:14)  tamsulosin 0.4 mg oral capsule: 1 cap(s) orally once a day (2022 15:14)  ticagrelor 90 mg oral tablet: 1 tab(s) orally 2 times a day (2022 15:14)  torsemide 20 mg oral tablet: 1 tab(s) orally once a day (2022 15:14)    MEDICATIONS  (STANDING):  aspirin enteric coated 81 milliGRAM(s) Oral daily  atorvastatin 40 milliGRAM(s) Oral at bedtime  chlorhexidine 2% Cloths 1 Application(s) Topical <User Schedule>  dextrose 5%. 1000 milliLiter(s) (50 mL/Hr) IV Continuous <Continuous>  dextrose 5%. 1000 milliLiter(s) (100 mL/Hr) IV Continuous <Continuous>  dextrose 50% Injectable 25 Gram(s) IV Push once  dextrose 50% Injectable 12.5 Gram(s) IV Push once  dextrose 50% Injectable 25 Gram(s) IV Push once  furosemide   Injectable 40 milliGRAM(s) IV Push daily  glucagon  Injectable 1 milliGRAM(s) IntraMuscular once  heparin  Infusion. 1200 Unit(s)/Hr (12 mL/Hr) IV Continuous <Continuous>  insulin glargine Injectable (LANTUS) 26 Unit(s) SubCutaneous at bedtime  insulin lispro (ADMELOG) corrective regimen sliding scale   SubCutaneous three times a day before meals  pantoprazole    Tablet 40 milliGRAM(s) Oral before breakfast  sertraline 50 milliGRAM(s) Oral daily  ticagrelor 90 milliGRAM(s) Oral every 12 hours    MEDICATIONS  (PRN):  aluminum hydroxide/magnesium hydroxide/simethicone Suspension 30 milliLiter(s) Oral every 6 hours PRN Dyspepsia  dextrose Oral Gel 15 Gram(s) Oral once PRN Blood Glucose LESS THAN 70 milliGRAM(s)/deciliter  heparin   Injectable 6500 Unit(s) IV Push every 6 hours PRN For aPTT less than 40  heparin   Injectable 3000 Unit(s) IV Push every 6 hours PRN For aPTT between 40 - 57      REVIEW OF SYSTEMS:  General:   Respiratory: No cough, SOB  Cardiovascular: No CP or Palpitations	  Gastrointestinal: No nausea, Vomiting. No diarrhea  Genitourinary: No urinary complaints	  Musculoskeletal: No leg swelling, No new rash or lesions	  Neurological: 	  all other systems negative    T(F): 97.7 (22 @ 08:27), Max: 98.2 (22 @ 23:53)  HR: 81 (22 @ 10:00) (80 - 99)  BP: 129/66 (22 @ 10:00) (73/51 - 131/65)  RR: 24 (22 @ 10:00) (18 - 45)  SpO2: 93% (22 @ 10:00) (83% - 100%)  Wt(kg): --    PHYSICAL EXAM:  General: NAD  Respiratory: b/l air entry  Cardiovascular: S1 S2  Gastrointestinal: soft  Extremities: edema            141  |  104  |  43<H>  ----------------------------<  196<H>  4.2   |  27  |  1.70<H>    Ca    9.2      2022 04:30  Phos  4.1       Mg     2.4         TPro  7.2  /  Alb  3.5  /  TBili  1.1  /  DBili  x   /  AST  36  /  ALT  69  /  AlkPhos  87                            11.5   14.88 )-----------( 184      ( 2022 09:25 )             38.7       Hemoglobin: 11.5 g/dL ( @ 09:25)  Hematocrit: 38.7 % ( @ 09:25)  Hemoglobin: 11.2 g/dL ( @ 04:30)  Hematocrit: 37.2 % ( @ 04:30)      Creatinine, Serum: 1.70 ( @ 04:30)  Creatinine, Serum: 1.90 ( @ 09:20)      Urinalysis Basic - ( 2022 10:00 )    Color: Yellow / Appearance: Clear / S.010 / pH: x  Gluc: x / Ketone: Negative  / Bili: Negative / Urobili: Negative   Blood: x / Protein: Negative / Nitrite: Negative   Leuk Esterase: Negative / RBC: x / WBC x   Sq Epi: x / Non Sq Epi: x / Bacteria: x      LIVER FUNCTIONS - ( 2022 04:30 )  Alb: 3.5 g/dL / Pro: 7.2 g/dL / ALK PHOS: 87 U/L / ALT: 69 U/L / AST: 36 U/L / GGT: x           CARDIAC MARKERS ( 2022 09:20 )  x     / x     / 104 U/L / x     / 6.6 ng/mL      Creatine Kinase, Serum: 104 U/L (22 @ 09:20)        ABG - ( 2022 21:11 )  pH, Arterial: 7.48  pH, Blood: x     /  pCO2: 30    /  pO2: 49    / HCO3: 22    / Base Excess: -1.2  /  SaO2: 83.1              I&O's Detail    2022 07:  -  2022 07:00  --------------------------------------------------------  IN:    Heparin Infusion: 76 mL    Heparin Infusion: 60 mL    Norepinephrine: 40.2 mL    Oral Fluid: 60 mL  Total IN: 236.2 mL    OUT:    Indwelling Catheter - Urethral (mL): 1200 mL    Voided (mL): 800 mL  Total OUT: 2000 mL    Total NET: -1763.8 mL      2022 07:  -  2022 11:32  --------------------------------------------------------  IN:    Heparin Infusion: 36 mL  Total IN: 36 mL    OUT:    Indwelling Catheter - Urethral (mL): 300 mL    Norepinephrine: 0 mL  Total OUT: 300 mL    Total NET: -264 mL             Patient is a 77y old  Male who presents with a chief complaint of Substernal chest pain since last night (2022 19:45)    HPI:  BEKAH AMOR is a 78 yo male brought to the ED because of chest discomfort. Patient reports that he is experiencing substernal chest pain since last night. He reports that the pain has been constant. Feels similar to his previous MIs. Pt reports  that approx 2 months pt had an MI and he reports that he had several stents collapse. Denies fever, chills, N/V, abd pain. Pt does note some tremors, SOB and a non productive.     PMH of Cellulitis CHF (congestive heart failure) Chronic back pain Diabetes on metformin, Humalog insulin pump Heart attack , Hiatal hernia High cholesterol HTN (hypertension) Insulin pump in place Lumbar herniated disc Lung cancer, upper lobe, left OA (osteoarthritis) Sleep apnea uses c pap at home Spinal stenosis in cervical region Spinal stenosis of lumbar region Stented coronary artery Drug eluding x 6 stents TIA (transient ischemic attack) 2014 Vertigo.    (2022 19:45)    Renal consult called for JIMENEZ. History obtained from chart.       PAST MEDICAL HISTORY:  Lung cancer, upper lobe, left    Heart attack    Stented coronary artery    Sleep apnea    HTN (hypertension)    High cholesterol    Diabetes    Insulin pump in place    Hiatal hernia    Vertigo    Stented coronary artery    Type 2 diabetes mellitus    TIA (transient ischemic attack)    Chronic back pain    Spinal stenosis in cervical region    Spinal stenosis of lumbar region    Lumbar herniated disc    OA (osteoarthritis)    CHF (congestive heart failure)    Cellulitis        PAST SURGICAL HISTORY:  S/P partial lobectomy of lung    History of throat surgery    S/P angioplasty with stent        FAMILY HISTORY:  No pertinent family history in first degree relatives        SOCIAL HISTORY: No smoking or alcohol use     Allergies    No Known Allergies    Intolerances      Home Medications:  aspirin 81 mg oral delayed release tablet: 1 tab(s) orally once a day (2022 15:14)  atorvastatin 40 mg oral tablet: 1 tab(s) orally once a day (2022 15:14)  carvedilol 3.125 mg oral tablet: 1 tab(s) orally 2 times a day (2022 15:14)  digoxin 125 mcg (0.125 mg) oral tablet: 1 tab(s) orally every other day (2022 15:14)  Entresto 24 mg-26 mg oral tablet: 0.5 tab(s) orally 2 times a day (2022 15:14)  gabapentin 100 mg oral capsule: 6 cap(s) orally once a day (in the morning) (2022 15:14)  gabapentin 300 mg oral capsule: 1 cap(s) orally once a day (in the evening) (2022 15:14)  insulin aspart 100 units/mL injectable solution: uses via insulin pump (2022 15:14)  Jardiance 25 mg oral tablet: 0.5 tab(s) orally once a day (in the morning) (2022 15:14)  metFORMIN 500 mg oral tablet: 2 tab(s) orally 2 times a day (2022 15:14)  pantoprazole 40 mg oral delayed release tablet: 1 tab(s) orally once a day (2022 15:14)  sertraline 50 mg oral tablet: 1 tab(s) orally once a day (2022 15:14)  spironolactone 25 mg oral tablet: 0.5 tab(s) orally once a day (2022 15:14)  tamsulosin 0.4 mg oral capsule: 1 cap(s) orally once a day (2022 15:14)  ticagrelor 90 mg oral tablet: 1 tab(s) orally 2 times a day (2022 15:14)  torsemide 20 mg oral tablet: 1 tab(s) orally once a day (2022 15:14)    MEDICATIONS  (STANDING):  aspirin enteric coated 81 milliGRAM(s) Oral daily  atorvastatin 40 milliGRAM(s) Oral at bedtime  chlorhexidine 2% Cloths 1 Application(s) Topical <User Schedule>  dextrose 5%. 1000 milliLiter(s) (50 mL/Hr) IV Continuous <Continuous>  dextrose 5%. 1000 milliLiter(s) (100 mL/Hr) IV Continuous <Continuous>  dextrose 50% Injectable 25 Gram(s) IV Push once  dextrose 50% Injectable 12.5 Gram(s) IV Push once  dextrose 50% Injectable 25 Gram(s) IV Push once  furosemide   Injectable 40 milliGRAM(s) IV Push daily  glucagon  Injectable 1 milliGRAM(s) IntraMuscular once  heparin  Infusion. 1200 Unit(s)/Hr (12 mL/Hr) IV Continuous <Continuous>  insulin glargine Injectable (LANTUS) 26 Unit(s) SubCutaneous at bedtime  insulin lispro (ADMELOG) corrective regimen sliding scale   SubCutaneous three times a day before meals  pantoprazole    Tablet 40 milliGRAM(s) Oral before breakfast  sertraline 50 milliGRAM(s) Oral daily  ticagrelor 90 milliGRAM(s) Oral every 12 hours    MEDICATIONS  (PRN):  aluminum hydroxide/magnesium hydroxide/simethicone Suspension 30 milliLiter(s) Oral every 6 hours PRN Dyspepsia  dextrose Oral Gel 15 Gram(s) Oral once PRN Blood Glucose LESS THAN 70 milliGRAM(s)/deciliter  heparin   Injectable 6500 Unit(s) IV Push every 6 hours PRN For aPTT less than 40  heparin   Injectable 3000 Unit(s) IV Push every 6 hours PRN For aPTT between 40 - 57      REVIEW OF SYSTEMS:  General: no distress  Respiratory: No cough, SOB  Cardiovascular: CP resolved	  Gastrointestinal: No nausea, Vomiting. No diarrhea  Genitourinary: No urinary complaints	  Musculoskeletal: No leg swelling, No new rash or lesions	  Neurological: 	  all other systems negative    T(F): 97.7 (22 @ 08:27), Max: 98.2 (22 @ 23:53)  HR: 81 (22 @ 10:00) (80 - 99)  BP: 129/66 (22 @ 10:00) (73/51 - 131/65)  RR: 24 (22 @ 10:00) (18 - 45)  SpO2: 93% (22 @ 10:00) (83% - 100%)  Wt(kg): --    PHYSICAL EXAM:  General: NAD  Respiratory: b/l air entry  Cardiovascular: S1 S2  Gastrointestinal: soft  Extremities: no edema            141  |  104  |  43<H>  ----------------------------<  196<H>  4.2   |  27  |  1.70<H>    Ca    9.2      2022 04:30  Phos  4.1       Mg     2.4         TPro  7.2  /  Alb  3.5  /  TBili  1.1  /  DBili  x   /  AST  36  /  ALT  69  /  AlkPhos  87                            11.5   14.88 )-----------( 184      ( 2022 09:25 )             38.7       Hemoglobin: 11.5 g/dL ( @ 09:25)  Hematocrit: 38.7 % ( @ 09:25)  Hemoglobin: 11.2 g/dL ( @ 04:30)  Hematocrit: 37.2 % ( @ 04:30)      Creatinine, Serum: 1.70 ( @ 04:30)  Creatinine, Serum: 1.90 ( @ 09:20)      Urinalysis Basic - ( 2022 10:00 )    Color: Yellow / Appearance: Clear / S.010 / pH: x  Gluc: x / Ketone: Negative  / Bili: Negative / Urobili: Negative   Blood: x / Protein: Negative / Nitrite: Negative   Leuk Esterase: Negative / RBC: x / WBC x   Sq Epi: x / Non Sq Epi: x / Bacteria: x      LIVER FUNCTIONS - ( 2022 04:30 )  Alb: 3.5 g/dL / Pro: 7.2 g/dL / ALK PHOS: 87 U/L / ALT: 69 U/L / AST: 36 U/L / GGT: x           CARDIAC MARKERS ( 2022 09:20 )  x     / x     / 104 U/L / x     / 6.6 ng/mL      Creatine Kinase, Serum: 104 U/L (22 @ 09:20)        ABG - ( 2022 21:11 )  pH, Arterial: 7.48  pH, Blood: x     /  pCO2: 30    /  pO2: 49    / HCO3: 22    / Base Excess: -1.2  /  SaO2: 83.1              I&O's Detail    2022 07:  -  2022 07:00  --------------------------------------------------------  IN:    Heparin Infusion: 76 mL    Heparin Infusion: 60 mL    Norepinephrine: 40.2 mL    Oral Fluid: 60 mL  Total IN: 236.2 mL    OUT:    Indwelling Catheter - Urethral (mL): 1200 mL    Voided (mL): 800 mL  Total OUT: 2000 mL    Total NET: -1763.8 mL      2022 07:01  -  2022 11:32  --------------------------------------------------------  IN:    Heparin Infusion: 36 mL  Total IN: 36 mL    OUT:    Indwelling Catheter - Urethral (mL): 300 mL    Norepinephrine: 0 mL  Total OUT: 300 mL    Total NET: -264 mL    < from: CT Chest No Cont (22 @ 12:16) >    ACC: [id number] EXAM:  CT CHEST                          PROCEDURE DATE:  2022          INTERPRETATION:  CLINICAL INFORMATION: Abnormal chest x-ray    COMPARISON: None.    CONTRAST/COMPLICATIONS:  IV Contrast: NONE  Oral Contrast: NONE  Complications: None reported at time of study completion    PROCEDURE:  CT of the Chest was performed.  Sagittal and coronal reformats were performed.    FINDINGS:    LUNGS AND AIRWAYS: Patent central airways.  Prominent subpleural   intralobular septa suggesting interstitial edema. Airspace disease   throughout the right lung may represent pulmonary edema.   Infection/inflammation not excluded.  PLEURA: Small bilateral layering pleural effusions..  MEDIASTINUM AND LINDSAY: No lymphadenopathy.  VESSELS: Within normal limits.  HEART: Heart size is normal. Coronary artery stent.  cardiac pacer. No   pericardial effusion.  CHEST WALL AND LOWER NECK: Within normal limits.  VISUALIZED UPPER ABDOMEN: Small hiatal hernia.  BONES: Degenerative change.    IMPRESSION:  Interstitial edema and bilateral pleural effusions suggest an element of   CHF/fluid overload.  Airspace disease throughout right lung may represent pulmonary edema.   Inflammation/infection not excluded.        --- End of Report ---            JIMY HU MD; Attending Radiologist  This document has been electronically signed. 2022 12:34PM    < end of copied text >  < from: Xray Chest 1 View-PORTABLE IMMEDIATE (22 @ 09:38) >    ACC: [id number] EXAM:  XR CHEST PORTABLE IMMED 1V                          PROCEDURE DATE:  2022          INTERPRETATION:  INDICATION: Sepsis    COMPARISON: 2014    FINDINGS:  A semiupright portable chest x-ray demonstrates findings suspicious for   CHF with bilateral perihilar infiltrates and cardiomegaly with AV   sequential pacemaker. Epicardial pacemaker leads are suggested. These   findings represent a change compared to the older exam. There are no   focal lobar consolidations tosuggest pneumonia. There is no   pneumothorax. Any basilar effusion would be small.    IMPRESSION:  1. Cardiomegaly, AV sequential pacemaker with epicardial pacemaker leads   along with CHF.  2. No focal infiltrates indicate pneumonia.  3. Basilar effusions cannot be entirely excluded, particularly on the   left.    --- End of Report ---            JUAN TA MD; Attending Radiologist  This document has been electronically signed. 2022  3:33PM    < end of copied text >

## 2022-07-01 NOTE — PROGRESS NOTE ADULT - SUBJECTIVE AND OBJECTIVE BOX
Chief Complaint: Chest pain, shortness of breath    Interval Events: Admitted to ICU. Started on norepinephrine.    Review of Systems:  General: No fevers, chills, weight gain  Skin: No rashes, color changes  Cardiovascular: No chest pain, orthopnea  Respiratory: +shortness of breath, cough  Gastrointestinal: No nausea, abdominal pain  Genitourinary: No incontinence, pain with urination  Musculoskeletal: No pain, swelling, decreased range of motion  Neurological: No headache, weakness  Psychiatric: No depression, anxiety  Endocrine: No weight gain, increased thirst  All other systems are comprehensively negative.    Physical Exam:  Vitals:        Vital Signs Last 24 Hrs  T(C): 36.5 (01 Jul 2022 08:27), Max: 36.8 (30 Jun 2022 23:53)  T(F): 97.7 (01 Jul 2022 08:27), Max: 98.2 (30 Jun 2022 23:53)  HR: 86 (01 Jul 2022 07:00) (80 - 99)  BP: 111/60 (01 Jul 2022 07:00) (73/51 - 131/65)  BP(mean): 79 (01 Jul 2022 07:00) (58 - 90)  RR: 28 (01 Jul 2022 07:00) (18 - 45)  SpO2: 92% (01 Jul 2022 07:00) (83% - 100%)  General: NAD  HEENT: MMM  Neck: No JVD, no carotid bruit  Lungs: Rales at bases  CV: RRR, nl S1/S2, no M/R/G  Abdomen: S/NT/ND, +BS  Extremities: 1+ LE edema, no cyanosis  Neuro: AAOx3, non-focal  Skin: No rash    Labs:                        11.2   13.82 )-----------( 175      ( 01 Jul 2022 04:30 )             37.2     07-01    141  |  104  |  43<H>  ----------------------------<  196<H>  4.2   |  27  |  1.70<H>    Ca    9.2      01 Jul 2022 04:30  Phos  4.1     07-01  Mg     2.4     07-01    TPro  7.2  /  Alb  3.5  /  TBili  1.1  /  DBili  x   /  AST  36  /  ALT  69  /  AlkPhos  87  07-01    CARDIAC MARKERS ( 30 Jun 2022 09:20 )  x     / x     / 104 U/L / x     / 6.6 ng/mL      PT/INR - ( 30 Jun 2022 09:20 )   PT: 13.1 sec;   INR: 1.12 ratio         PTT - ( 01 Jul 2022 01:45 )  PTT:36.3 sec    Telemetry: Ventricular paced

## 2022-07-01 NOTE — CONSULT NOTE ADULT - ASSESSMENT
JIMENEZ on CKD 3  CHF  Shock on pressors  h/o Hypertension  Diabetes    Clinically improving. On IV diuresis. Taper pressors as tolerated. Monitor blood sugar levels. Insulin coverage as needed. Dietary restriction.   D/w ICU team. Will follow electrolytes and renal function trend. Avoid nephrotoxic meds as possible.   Further recommendations pending clinical course. Thank you for the courtesy of this referral.

## 2022-07-01 NOTE — PROGRESS NOTE ADULT - ASSESSMENT
78 yo man with Hx CAD with 6 stents, HFrEF, BiVICD, most recent MI 2mos ago, presenting with CP and dyspnea, found to be in cardiogenic shock with NSTEMI, JIMENEZ, lactic acidosis.      --mentating well  --cardiogenic shock resolved, off pressors  continue daily lasix for volume overload  hold entresto given JIMENEZ  --CAD, NSTEMI, continue ASA, brilinta, heparin gtt  continue statin  no BB due to shock  episode of chest pain this am improved with nitro paste  --JIMENEZ improved  lactate normalized  --tolerating PO diet  --no infectious concerns  --DM2 uncontrolled  on pump at home but now transitioned to basal/bolus  increase lantus and add 4units premeal lispro  --stable for tele  --pt and son updated

## 2022-07-01 NOTE — CHART NOTE - NSCHARTNOTEFT_GEN_A_CORE
consult dictated    Impression:    Acute Hypoxic respiratory failure  Hx Left upper lobectomy 2014 for stage 1 non-small cell carcinoma of lung  Obstructive sleep apnea syndrome  Coronary artery disease - S/P multiple stents  Acute on Chronic Systolic congestive heart failure  Diabetes type 2    Plan:    Supplemental oxygen  Nocturnal CPAP  Diuretics per cardiology  Cardiac monitoring  Entresto

## 2022-07-01 NOTE — PROGRESS NOTE ADULT - ASSESSMENT
BEKAH AMOR is a 76 yo male brought to the ED because of chest discomfort. Patient reports that he is experiencing substernal chest pain since last night. He reports that the pain has been constant. Feels similar to his previous MIs. Pt reports  that approx 2 months pt had an MI and he reports that he had several stents collapse.

## 2022-07-01 NOTE — PROGRESS NOTE ADULT - SUBJECTIVE AND OBJECTIVE BOX
Patient is a 77y old  Male who presents with a chief complaint of     Type: 2 DX 1958. endocrine [facility] can't remember name, reports last A1c 7.9, now 8.6%  No Hx DKA/HHS. medtronic insulin pump Novolog U-100  settings: ISF 1:25 mg/dL, CR 1:10 gms, Basal total 28 units/daily. bolus average 10-15 units daily, freestyle sandro CGM and pump removed yesterday, wife will bring home insulin pump today.     In hospital: F/S 174<330  consistent carb diet  ACcucheck ACHS  Lantus 26 Units @ HS  PANCHO    HPI:      PAST MEDICAL & SURGICAL HISTORY:  Lung cancer, upper lobe, left      Heart attack  1990,2005      Stented coronary artery  six cardiac stents      Sleep apnea  uses c pap at home      HTN (hypertension)      High cholesterol      Diabetes  on metformin, humalog insulin pump      Insulin pump in place      Hiatal hernia      Vertigo      Stented coronary artery  Drug eluding x 6 stents      Type 2 diabetes mellitus      TIA (transient ischemic attack)  Jan 2014      Chronic back pain      Spinal stenosis in cervical region      Spinal stenosis of lumbar region      Lumbar herniated disc      OA (osteoarthritis)      CHF (congestive heart failure)      Cellulitis      S/P partial lobectomy of lung  lobectomy of left upper lobe. April 2014      History of throat surgery  1996      S/P angioplasty with stent  2005 and 2006          REVIEW OF SYSTEMS  General: alert and oriented, [place]  Respiratory: NAD, No SOB, no cough  Cardiovascular: No chest pain, no palpitations	  Endocrine: no polyuria, no polydipsia, or S/S of hypoglycemia        Allergies    No Known Allergies    Intolerances        MEDICATIONS  (STANDING):  aspirin enteric coated 81 milliGRAM(s) Oral daily  atorvastatin 40 milliGRAM(s) Oral at bedtime  chlorhexidine 2% Cloths 1 Application(s) Topical <User Schedule>  dextrose 5%. 1000 milliLiter(s) (50 mL/Hr) IV Continuous <Continuous>  dextrose 5%. 1000 milliLiter(s) (100 mL/Hr) IV Continuous <Continuous>  dextrose 50% Injectable 25 Gram(s) IV Push once  dextrose 50% Injectable 12.5 Gram(s) IV Push once  dextrose 50% Injectable 25 Gram(s) IV Push once  glucagon  Injectable 1 milliGRAM(s) IntraMuscular once  heparin  Infusion.  Unit(s)/Hr (9.5 mL/Hr) IV Continuous <Continuous>  insulin glargine Injectable (LANTUS) 22 Unit(s) SubCutaneous at bedtime  norepinephrine Infusion 0.05 MICROgram(s)/kG/Min (7.31 mL/Hr) IV Continuous <Continuous>  pantoprazole    Tablet 40 milliGRAM(s) Oral before breakfast  sertraline 50 milliGRAM(s) Oral daily  ticagrelor 60 milliGRAM(s) Oral every 12 hours

## 2022-07-02 NOTE — PROCEDURE NOTE - NSPROCDETAILS_GEN_ALL_CORE
guidewire recovered/lumen(s) aspirated and flushed/sterile dressing applied/sterile technique, catheter placed/ultrasound guidance with use of sterile gel and probe cove
location identified, draped/prepped, sterile technique used/lumen(s) aspirated and flushed/sterile dressing applied

## 2022-07-02 NOTE — PROVIDER CONTACT NOTE (EICU) - BACKGROUND
-requesting order for levophed for hypotension  -propofol for sedation  -vent settings , RR 22, FIO2: 100%, PEEP 8  -CXR for ETT confirmation

## 2022-07-02 NOTE — PROGRESS NOTE ADULT - ASSESSMENT
77M significant PMH CAD s/p MEL x 6 (most recent MI 2 mo ago), HFrEF, BiV-ICD, chronic back pain, DM2 on metformin, Hiatal hernia, HLD, HTN, Lung CA, and PAPO originally a/w NSTEMI with cardiogenic shock component and downgraded.  Now return to ICU for ADHF, Cardiogenic shock, acute hypoxemic RF 2/2 cardiogenic pulmonary edema, worsening JIMENEZ and transaminitis.    ADHF  Cardiogenic Shock  Acute hypoxemic RF  Cardiogenic Pulmonary Edema  JIMENEZ  Transaminitis    Neuro:   - Sedated on propofol and fentanyl to facilitate mechanical ventilation; titrate down as clinically appropriate  CV: ADHF, Cardiogenic shock; POCUS with severely down LV and plump IVC with no respirophasic variation.  Actively titrating norepinephrine to maintain MAP > 65mmHg.  BNP 17k.  Will attempt aggressive diuresis and consideration of inotrope.  Cardiology following.  On heparin gtt for ACS.  C/w ASA/brilinta  Resp: Acute hypoxemic respiratory failure 2/2 cardiogenic pulm edema.  Pink frothy on intubation.  Lung protective strategy.  Aggressive chest PT and suctioning.  Will attempt to diurese.  Trend serial troponins.    GI: NPO with TF; Transaminitis likely congestive hepatopathy v shock liver.  Will continue to trend  Renal: JIMENEZ, hemodynamically mediated; HAGMA with lactate 13.4.  received furosemide x 1 prior to intubation.  Strict I&Os.  Indwelling laboy catheter.  UOP preserved.  Will attempt diuresis  ID: No indication for antibiotics at this time.  Heme: Heparin gtt for ACS protocol.  C/w ASA/brilinta  Endo: DM2 on ISS +lantus.    Dispo: Critically ill requiring ICU level of care.   77M significant PMH CAD s/p MEL x 6 (most recent MI 2 mo ago), HFrEF, BiV-ICD, chronic back pain, DM2 on metformin, Hiatal hernia, HLD, HTN, Lung CA, and PAPO originally a/w NSTEMI with cardiogenic shock component and downgraded.  Now return to ICU for ADHF, Cardiogenic shock, acute hypoxemic RF 2/2 cardiogenic pulmonary edema, worsening JIMENEZ and transaminitis.    ADHF  Cardiogenic Shock  Acute hypoxemic RF  Cardiogenic Pulmonary Edema  JIMENEZ  Transaminitis    Neuro:   - Sedated on propofol and fentanyl to facilitate mechanical ventilation; titrate down as clinically appropriate    CV:   - ADHF, Cardiogenic shock; POCUS with severely down LV and plump IVC with no respirophasic variation.  Actively titrating norepinephrine to maintain MAP > 65mmHg.  BNP 17k.  Will attempt aggressive diuresis and consideration of inotrope.  Cardiology following.  On heparin gtt for ACS.  C/w ASA/brilinta  Resp: Acute hypoxemic respiratory failure 2/2 cardiogenic pulm edema.  Pink frothy on intubation.  Lung protective strategy.  Aggressive chest PT and suctioning.  Will attempt to diurese.  Trend serial troponins.    GI: NPO with TF; Transaminitis likely congestive hepatopathy v shock liver.  Will continue to trend  Renal: JIMENEZ, hemodynamically mediated; HAGMA with lactate 13.4.  received furosemide x 1 prior to intubation.  Strict I&Os.  Indwelling laboy catheter.  UOP preserved.  Will attempt diuresis  ID: No indication for antibiotics at this time.  Heme: Heparin gtt for ACS protocol.  C/w ASA/brilinta  Endo: DM2 on ISS +lantus.    Dispo: Critically ill requiring ICU level of care.   77M significant PMH CAD s/p MEL x 6 (most recent MI 2 mo ago), HFrEF, BiV-ICD, chronic back pain, DM2 on metformin, Hiatal hernia, HLD, HTN, Lung CA, and PAPO originally a/w NSTEMI with cardiogenic shock component and downgraded.  Now return to ICU for ADHF, Cardiogenic shock, acute hypoxemic RF 2/2 cardiogenic pulmonary edema, worsening JIMENEZ and transaminitis.    ADHF  Cardiogenic Shock  Acute hypoxemic RF  Cardiogenic Pulmonary Edema  JIMENEZ  Transaminitis    Neuro:   - Sedated on propofol and fentanyl to facilitate mechanical ventilation; titrate down as clinically appropriate    CV:   - ADHF and Cardiogenic shock; continue norepinephrine to maintain MAP > 65mmHg  - BNP 16067, s/p Bumex 2 mg IVP x1 this AM  - Continue heparin gtt for NSTEMI  - Continue ASA and Brilinta  - Trop: 1071.8-->1740.6    - Will DC statin given shock liver     Resp:   - Acute hypoxemic respiratory failure 2/2 cardiogenic pulm edema  - Continue lung protective ventilation AC/CMV: 26/480/8/80  - Aggressive chest PT and suctioning    GI:   - NPO with TF  - Worsening transaminitis likely shock liver, will continue to trend   - Statin discontinued     Renal:  - JIMENEZ likely pre renal, improving HAGMA    - Monitor Cr   - Diurese PRN   - Strict I&Os    ID:   - lactate 13.4-->5.1, f/u repeat lactate  - Start Rocephin empirically   - 6/30 BCx x2 negative  - F/u repeat BCx x2, sputum cx, and MRSA PCR   - Monitor WBC and fever     Heme:   - Heparin gtt for NSTEMI    - C/w ASA/brilinta    Endo:  - DMT2 on ISS  and Lantus, will continue    Skin:   - Bryant  - RIJ central line     Dispo:  Critically ill requiring ICU level of care.   77M significant PMH CAD s/p MEL x 6 (most recent MI 2 mo ago), HFrEF, BiV-ICD, chronic back pain, DM2 on metformin, Hiatal hernia, HLD, HTN, Lung CA, and PAPO originally a/w NSTEMI with cardiogenic shock component and downgraded.  Now return to ICU for ADHF, Cardiogenic shock, acute hypoxemic RF 2/2 cardiogenic pulmonary edema, worsening JIMENEZ and transaminitis.    ADHF  Cardiogenic Shock  Acute hypoxemic RF  Cardiogenic Pulmonary Edema  JIMENEZ  Transaminitis    Neuro:   - Sedated on propofol and fentanyl to facilitate mechanical ventilation; titrate down as clinically appropriate    CV:   - ADHF and Cardiogenic shock; continue norepinephrine to maintain MAP > 65mmHg  - BNP 80495, s/p Bumex 2 mg IVP x1 this AM  - Continue heparin gtt for NSTEMI  - Continue ASA and Brilinta  - Trop: 1071.8-->1740.6    - TTE performed, f/u official read   - Will DC statin given shock liver     Resp:   - Acute hypoxemic respiratory failure 2/2 cardiogenic pulm edema  - Continue lung protective ventilation AC/CMV: 26/480/8/80  - Aggressive chest PT and suctioning    GI:   - NPO with TF  - Worsening transaminitis likely shock liver, will continue to trend   - Statin discontinued     Renal:  - JIMENEZ likely pre renal, improving HAGMA    - Monitor Cr   - Diurese PRN   - Strict I&Os    ID:   - lactate 13.4-->5.1, f/u repeat lactate  - Start Rocephin empirically   - 6/30 BCx x2 negative  - F/u repeat BCx x2, sputum cx, and MRSA PCR   - Monitor WBC and fever     Heme:   - Heparin gtt for NSTEMI    - C/w ASA/brilinta    Endo:  - DMT2 on ISS  and Lantus, will continue    Skin:   - [place]  - RIJ central line     Dispo:  Critically ill requiring ICU level of care.

## 2022-07-02 NOTE — CONSULT NOTE ADULT - SUBJECTIVE AND OBJECTIVE BOX
Patient is a 77y old  Male who presents with a chief complaint of Substernal chest pain since last night (02 Jul 2022 02:49)      Reason For Consult: dm2 uncontrolled    HPI:  BEKAH AMOR is a 76 yo male brought to the ED because of chest discomfort. Patient reports that he is experiencing substernal chest pain since last night. He reports that the pain has been constant. Feels similar to his previous MIs. Pt reports  that approx 2 months pt had an MI and he reports that he had several stents collapse. Denies fever, chills, N/V, abd pain. Pt does note some tremors, SOB and a non productive.   PMH of Cellulitis CHF (congestive heart failure) Chronic back pain Diabetes on metformin, Humalog insulin pump Heart attack 1990,2005 Hiatal hernia High cholesterol HTN (hypertension) Insulin pump in place Lumbar herniated disc Lung cancer, upper lobe, left OA (osteoarthritis) Sleep apnea uses c pap at home Spinal stenosis in cervical region Spinal stenosis of lumbar region Stented coronary artery Drug eluding x 6 stents TIA (transient ischemic attack) Jan 2014 Vertigo.    (30 Jun 2022 19:45)      PAST MEDICAL & SURGICAL HISTORY:  Lung cancer, upper lobe, left      Heart attack  1990,2005      Stented coronary artery  six cardiac stents      Sleep apnea  uses c pap at home      HTN (hypertension)      High cholesterol      Diabetes  on metformin, humalog insulin pump      Insulin pump in place      Hiatal hernia      Vertigo      Stented coronary artery  Drug eluding x 6 stents      Type 2 diabetes mellitus      TIA (transient ischemic attack)  Jan 2014      Chronic back pain      Spinal stenosis in cervical region      Spinal stenosis of lumbar region      Lumbar herniated disc      OA (osteoarthritis)      CHF (congestive heart failure)      Cellulitis      S/P partial lobectomy of lung  lobectomy of left upper lobe. April 2014      History of throat surgery  1996      S/P angioplasty with stent  2005 and 2006          FAMILY HISTORY:  No pertinent family history in first degree relatives          Social History:    MEDICATIONS  (STANDING):  aspirin  chewable 81 milliGRAM(s) Oral daily  atorvastatin 40 milliGRAM(s) Oral at bedtime  chlorhexidine 0.12% Liquid 15 milliLiter(s) Oral Mucosa every 12 hours  chlorhexidine 2% Cloths 1 Application(s) Topical <User Schedule>  dextrose 5%. 1000 milliLiter(s) (50 mL/Hr) IV Continuous <Continuous>  dextrose 5%. 1000 milliLiter(s) (100 mL/Hr) IV Continuous <Continuous>  dextrose 50% Injectable 25 Gram(s) IV Push once  dextrose 50% Injectable 12.5 Gram(s) IV Push once  dextrose 50% Injectable 25 Gram(s) IV Push once  fentaNYL   Infusion. 0.5 MICROgram(s)/kG/Hr (4.15 mL/Hr) IV Continuous <Continuous>  furosemide   Injectable 40 milliGRAM(s) IV Push daily  glucagon  Injectable 1 milliGRAM(s) IntraMuscular once  heparin  Infusion. 1200 Unit(s)/Hr (12 mL/Hr) IV Continuous <Continuous>  insulin glargine Injectable (LANTUS) 26 Unit(s) SubCutaneous at bedtime  insulin lispro (ADMELOG) corrective regimen sliding scale   SubCutaneous every 6 hours  norepinephrine Infusion 0.05 MICROgram(s)/kG/Min (7.78 mL/Hr) IV Continuous <Continuous>  pantoprazole  Injectable 40 milliGRAM(s) IV Push daily  propofol Infusion 10 MICROgram(s)/kG/Min (4.98 mL/Hr) IV Continuous <Continuous>  ticagrelor 90 milliGRAM(s) Oral every 12 hours    MEDICATIONS  (PRN):  acetaminophen     Tablet .. 650 milliGRAM(s) Oral every 6 hours PRN Temp greater or equal to 38C (100.4F)  dextrose Oral Gel 15 Gram(s) Oral once PRN Blood Glucose LESS THAN 70 milliGRAM(s)/deciliter  heparin   Injectable 6500 Unit(s) IV Push every 6 hours PRN For aPTT less than 40  heparin   Injectable 3000 Unit(s) IV Push every 6 hours PRN For aPTT between 40 - 57        T(C): 37 (07-01-22 @ 20:17), Max: 37 (07-01-22 @ 20:17)  HR: 78 (07-02-22 @ 05:36) (77 - 98)  BP: 134/74 (07-01-22 @ 20:17) (91/50 - 134/74)  RR: 20 (07-01-22 @ 20:17) (20 - 28)  SpO2: 98% (07-02-22 @ 05:36) (92% - 100%)  Wt(kg): --    PHYSICAL EXAM:  HEART: Regular rate and rhythm; No murmurs, rubs, or gallops  ABDOMEN: Soft, Nontender, Nondistended; Bowel sounds present  EXTREMITIES:  2+ Peripheral Pulses, No clubbing, cyanosis, or edema  SKIN: No rashes or lesions    CAPILLARY BLOOD GLUCOSE      POCT Blood Glucose.: 293 mg/dL (02 Jul 2022 05:50)  POCT Blood Glucose.: 253 mg/dL (01 Jul 2022 22:29)  POCT Blood Glucose.: 267 mg/dL (01 Jul 2022 17:38)  POCT Blood Glucose.: 330 mg/dL (01 Jul 2022 11:13)  POCT Blood Glucose.: 226 mg/dL (01 Jul 2022 08:09)                            11.5   16.50 )-----------( 228      ( 02 Jul 2022 03:20 )             37.9       CMP:  07-02 @ 03:20  SGPT 3127  Albumin 3.2   Alk Phos 129   Anion Gap 15   SGOT 3086   Total Bili 3.0   BUN 56   Calcium Total 8.8   CO2 22   Chloride 98   Creatinine 2.60   eGFR if AA --   eGFR if non AA --   Glucose 281   Potassium 5.2   Protein 6.8   Sodium 135      Thyroid Function Tests:      Diabetes Tests:       Radiology:

## 2022-07-02 NOTE — PROGRESS NOTE ADULT - SUBJECTIVE AND OBJECTIVE BOX
Patient is a 77y Male with a known history of :  Type 2 diabetes mellitus [E11.9]    Acute MI, subendocardial [I21.4]    Lung cancer, upper lobe [C34.10]    Hypertension [I10]      HPI:  BEKAH AMOR is a 78 yo male brought to the ED because of chest discomfort. Patient reports that he is experiencing substernal chest pain since last night. He reports that the pain has been constant. Feels similar to his previous MIs. Pt reports  that approx 2 months pt had an MI and he reports that he had several stents collapse. Denies fever, chills, N/V, abd pain. Pt does note some tremors, SOB and a non productive.   PMH of Cellulitis CHF (congestive heart failure) Chronic back pain Diabetes on metformin, Humalog insulin pump Heart attack 1990,2005 Hiatal hernia High cholesterol HTN (hypertension) Insulin pump in place Lumbar herniated disc Lung cancer, upper lobe, left OA (osteoarthritis) Sleep apnea uses c pap at home Spinal stenosis in cervical region Spinal stenosis of lumbar region Stented coronary artery Drug eluding x 6 stents TIA (transient ischemic attack) Jan 2014 Vertigo.    (30 Jun 2022 19:45)      REVIEW OF SYSTEMS:    CONSTITUTIONAL: No fever, weight loss, or fatigue  EYES: No eye pain, visual disturbances, or discharge  ENMT:  No difficulty hearing, tinnitus, vertigo; No sinus or throat pain  NECK: No pain or stiffness  BREASTS: No pain, masses, or nipple discharge  RESPIRATORY: No cough, wheezing, chills or hemoptysis; No shortness of breath  CARDIOVASCULAR: No chest pain, palpitations, dizziness, or leg swelling  GASTROINTESTINAL: No abdominal or epigastric pain. No nausea, vomiting, or hematemesis; No diarrhea or constipation. No melena or hematochezia.  GENITOURINARY: No dysuria, frequency, hematuria, or incontinence  NEUROLOGICAL: No headaches, memory loss, loss of strength, numbness, or tremors  SKIN: No itching, burning, rashes, or lesions   LYMPH NODES: No enlarged glands  ENDOCRINE: No heat or cold intolerance; No hair loss  MUSCULOSKELETAL: No joint pain or swelling; No muscle, back, or extremity pain  PSYCHIATRIC: No depression, anxiety, mood swings, or difficulty sleeping  HEME/LYMPH: No easy bruising, or bleeding gums  ALLERGY AND IMMUNOLOGIC: No hives or eczema    MEDICATIONS  (STANDING):  aspirin  chewable 81 milliGRAM(s) Oral daily  atorvastatin 40 milliGRAM(s) Oral at bedtime  cefTRIAXone   IVPB      cefTRIAXone   IVPB 1000 milliGRAM(s) IV Intermittent once  chlorhexidine 0.12% Liquid 15 milliLiter(s) Oral Mucosa every 12 hours  chlorhexidine 2% Cloths 1 Application(s) Topical <User Schedule>  dextrose 5%. 1000 milliLiter(s) (50 mL/Hr) IV Continuous <Continuous>  dextrose 5%. 1000 milliLiter(s) (100 mL/Hr) IV Continuous <Continuous>  dextrose 50% Injectable 25 Gram(s) IV Push once  dextrose 50% Injectable 12.5 Gram(s) IV Push once  dextrose 50% Injectable 25 Gram(s) IV Push once  fentaNYL   Infusion. 0.5 MICROgram(s)/kG/Hr (4.15 mL/Hr) IV Continuous <Continuous>  furosemide   Injectable 40 milliGRAM(s) IV Push daily  glucagon  Injectable 1 milliGRAM(s) IntraMuscular once  heparin  Infusion. 1200 Unit(s)/Hr (12 mL/Hr) IV Continuous <Continuous>  insulin glargine Injectable (LANTUS) 26 Unit(s) SubCutaneous at bedtime  insulin lispro (ADMELOG) corrective regimen sliding scale   SubCutaneous every 6 hours  norepinephrine Infusion 0.05 MICROgram(s)/kG/Min (7.78 mL/Hr) IV Continuous <Continuous>  pantoprazole  Injectable 40 milliGRAM(s) IV Push daily  propofol Infusion 10 MICROgram(s)/kG/Min (4.98 mL/Hr) IV Continuous <Continuous>  ticagrelor 90 milliGRAM(s) Oral every 12 hours    MEDICATIONS  (PRN):  acetaminophen     Tablet .. 650 milliGRAM(s) Oral every 6 hours PRN Temp greater or equal to 38C (100.4F)  dextrose Oral Gel 15 Gram(s) Oral once PRN Blood Glucose LESS THAN 70 milliGRAM(s)/deciliter  heparin   Injectable 6500 Unit(s) IV Push every 6 hours PRN For aPTT less than 40  heparin   Injectable 3000 Unit(s) IV Push every 6 hours PRN For aPTT between 40 - 57      ALLERGIES: No Known Allergies      FAMILY HISTORY:  No pertinent family history in first degree relatives        Social history:  Alochol:   Smoking:   Drug Use:   Marital Status:     PHYSICAL EXAMINATION:  -----------------------------  T(C): 37.4 (07-02-22 @ 04:00), Max: 37.4 (07-02-22 @ 04:00)  HR: 78 (07-02-22 @ 08:30) (71 - 98)  BP: 112/58 (07-02-22 @ 08:30) (78/39 - 134/74)  RR: 6 (07-02-22 @ 08:30) (0 - 39)  SpO2: 96% (07-02-22 @ 08:30) (92% - 100%)  Wt(kg): --    07-01 @ 07:01  -  07-02 @ 07:00  --------------------------------------------------------  IN:    FentaNYL: 37.4 mL    Heparin Infusion: 288 mL    Norepinephrine: 194.6 mL    Oral Fluid: 420 mL    Propofol: 62.5 mL  Total IN: 1002.5 mL    OUT:    Indwelling Catheter - Urethral (mL): 1590 mL    Norepinephrine: 0 mL  Total OUT: 1590 mL    Total NET: -587.6 mL      07-02 @ 07:01  -  07-02 @ 08:57  --------------------------------------------------------  IN:    FentaNYL: 12 mL    Heparin Infusion: 12 mL    Norepinephrine: 46.7 mL    Propofol: 12.5 mL  Total IN: 83.2 mL    OUT:  Total OUT: 0 mL    Total NET: 83.2 mL          Weight (kg): 83 (07-02 @ 01:30)    Constitutional: well developed, normal appearance, well groomed, well nourished, no deformities and no acute distress.   Eyes: the conjunctiva exhibited no abnormalities and the eyelids demonstrated no xanthelasmas.   HEENT: normal oral mucosa, no oral pallor and no oral cyanosis.   Neck: normal jugular venous A waves present, normal jugular venous V waves present and no jugular venous venegas A waves.   Pulmonary: no respiratory distress, normal respiratory rhythm and effort, no accessory muscle use and lungs were clear to auscultation bilaterally. Anteriorly  Cardiovascular: heart rate and rhythm were normal, normal S1 and S2 and no murmur, gallop, rub, heave or thrill are present.    Musculoskeletal: the gait could not be assessed.   Extremities: no clubbing of the fingernails, no localized cyanosis, no petechial hemorrhages and no ischemic changes.   Skin: normal skin color and pigmentation, no rash, no venous stasis, no skin lesions, no skin ulcer and no xanthoma was observed.       LABS:   --------  07-02    135  |  98  |  56<H>  ----------------------------<  281<H>  5.2   |  22  |  2.60<H>    Ca    8.8      02 Jul 2022 03:20  Phos  4.9     07-02  Mg     2.6     07-02    TPro  6.8  /  Alb  3.2<L>  /  TBili  3.0<H>  /  DBili  x   /  AST  3086<H>  /  ALT  3127<H>  /  AlkPhos  129<H>  07-02                         11.5   16.50 )-----------( 228      ( 02 Jul 2022 03:20 )             37.9     PT/INR - ( 02 Jul 2022 03:20 )   PT: 24.2 sec;   INR: 2.05 ratio         PTT - ( 02 Jul 2022 03:20 )  PTT:57.4 sec  07-02 @ 00:36 BNP: 72206 pg/mL        Culture Results:   <10,000 CFU/mL Normal Urogenital Janiya (06-30 @ 10:00)  Culture Results:   No growth to date. (06-30 @ 09:15)  Culture Results:   No growth to date. (06-30 @ 09:05)    06-30 @ 10:00    Organism --   Gram Stain Blood -- Gram Stain --  Specimen Source Clean Catch Clean Catch (Midstream)  Culture-Blood --    06-30 @ 09:15    Organism --   Gram Stain Blood -- Gram Stain --  Specimen Source .Blood Blood-Peripheral  Culture-Blood --    06-30 @ 09:05    Organism --   Gram Stain Blood -- Gram Stain --  Specimen Source .Blood Blood-Peripheral  Culture-Blood --        Radiology:

## 2022-07-02 NOTE — PROCEDURE NOTE - NSSITEPREP_SKIN_A_CORE
Adherence to aseptic technique: hand hygiene prior to donning barriers (gown, gloves), don cap and mask, sterile drape over patient
Adherence to aseptic technique: hand hygiene prior to donning barriers (gown, gloves), don cap and mask, sterile drape over patient

## 2022-07-02 NOTE — PROGRESS NOTE ADULT - ASSESSMENT
77M significant PMH CAD s/p MEL x 6 (most recent MI 2 mo ago), HFrEF, BiV-ICD, chronic back pain, DM2 on metformin, Hiatal hernia, HLD, HTN, Lung CA, and PAPO originally a/w NSTEMI with cardiogenic shock component and downgraded.  Now return to ICU for ADHF, Cardiogenic shock, acute hypoxemic RF 2/2 cardiogenic pulmonary edema, worsening JIMENEZ and transaminitis.    ADHF  Cardiogenic Shock  Acute hypoxemic RF  Cardiogenic Pulmonary Edema  JIMENEZ  Transaminitis    Neuro: baseline alert and oriented x 3, now sedated on propofol and fentanyl to facilitate mechanical ventilation; titrate down as clinically appropriate  CV: ADHF, Cardiogenic shock; POCUS with severely down LV and plump IVC with no respirophasic variation.  Actively titrating norepinephrine to maintain MAP > 65mmHg.  BNP 17k.  Will attempt aggressive diuresis and consideration of inotrope.  Cardiology following.  On heparin gtt for ACS.  C/w ASA/brilinta  Resp: Acute hypoxemic respiratory failure 2/2 cardiogenic pulm edema.  Pink frothy on intubation.  Lung protective strategy.  Aggressive chest PT and suctioning.  Will attempt to diurese.  Trend serial troponins.    GI: NPO with TF; Transaminitis likely congestive hepatopathy v shock liver.  Will continue to trend  Renal: JIMENEZ, hemodynamically mediated; HAGMA with lactate 13.4.  received furosemide x 1 prior to intubation.  Strict I&Os.  Indwelling laboy catheter.  UOP preserved.  Will attempt diuresis  ID: No indication for antibiotics at this time.  Heme: Heparin gtt for ACS protocol.  C/w ASA/brilinta  Endo: DM2 on ISS +lantus.    Dispo: Critically ill requiring ICU level of care.

## 2022-07-02 NOTE — PROGRESS NOTE ADULT - SUBJECTIVE AND OBJECTIVE BOX
****** CHARTING IN PROGRESS *******    INTERVAL HPI/OVERNIGHT EVENTS: Overnight, pt was agitated and tachypneic, was given Ativan and started on BIPAP. Pt was in respiratory distress, repeat CXR with worsening R infiltrate and pulmonary edema and pt was given Lasix 40 mg IVP x1. Pt had increase WOB, RR in the 40-50s and was brought to the ICU and emergently intubated, placed on Levophed, Fentanyl, Propofol. Pt had labs done which revealed transaminitis, elevated proBNP 17,155 and lactate 13.4.   Pt was given Bumex 2 mg IVP x1 this AM.     SUBJECTIVE: Patient seen and examined at bedside.     ROS: Unable to obtain as pt is intubated     OBJECTIVE:    VITAL SIGNS:  ICU Vital Signs Last 24 Hrs  T(C): 37.8 (02 Jul 2022 09:00), Max: 37.8 (02 Jul 2022 09:00)  T(F): 100 (02 Jul 2022 09:00), Max: 100 (02 Jul 2022 09:00)  HR: 84 (02 Jul 2022 14:00) (71 - 98)  BP: 96/54 (02 Jul 2022 14:00) (78/39 - 134/74)  BP(mean): 72 (02 Jul 2022 14:00) (53 - 86)  ABP: --  ABP(mean): --  RR: 18 (02 Jul 2022 14:00) (0 - 39)  SpO2: 94% (02 Jul 2022 14:00) (91% - 100%)    Mode: AC/ CMV (Assist Control/ Continuous Mandatory Ventilation), RR (machine): 26, TV (machine): 450, FiO2: 50, PEEP: 8, ITime: 1, MAP: 12, PIP: 26    07-01 @ 07:01  -  07-02 @ 07:00  --------------------------------------------------------  IN: 1002.5 mL / OUT: 1590 mL / NET: -587.6 mL    07-02 @ 07:01  -  07-02 @ 14:19  --------------------------------------------------------  IN: 339.9 mL / OUT: 600 mL / NET: -260.1 mL      CAPILLARY BLOOD GLUCOSE      POCT Blood Glucose.: 280 mg/dL (02 Jul 2022 12:01)      PHYSICAL EXAM:  General: NAD, comfortable  HEENT: NCAT, PERRL, clear conjunctiva  Respiratory: diminished BS BL   Cardiovascular: RRR, normal S1S2, no M/R/G  Abdomen: soft, NT/ND, bowel sounds in all four quadrants, no palpable masses, obese  Extremities: no clubbing, cyanosis, or edema    MEDICATIONS:  MEDICATIONS  (STANDING):  aspirin  chewable 81 milliGRAM(s) Oral daily  cefTRIAXone   IVPB      chlorhexidine 0.12% Liquid 15 milliLiter(s) Oral Mucosa every 12 hours  chlorhexidine 2% Cloths 1 Application(s) Topical <User Schedule>  dextrose 5%. 1000 milliLiter(s) (50 mL/Hr) IV Continuous <Continuous>  dextrose 5%. 1000 milliLiter(s) (100 mL/Hr) IV Continuous <Continuous>  dextrose 50% Injectable 25 Gram(s) IV Push once  dextrose 50% Injectable 12.5 Gram(s) IV Push once  dextrose 50% Injectable 25 Gram(s) IV Push once  fentaNYL   Infusion. 0.5 MICROgram(s)/kG/Hr (4.15 mL/Hr) IV Continuous <Continuous>  furosemide   Injectable 40 milliGRAM(s) IV Push daily  glucagon  Injectable 1 milliGRAM(s) IntraMuscular once  heparin  Infusion. 1200 Unit(s)/Hr (12 mL/Hr) IV Continuous <Continuous>  insulin glargine Injectable (LANTUS) 26 Unit(s) SubCutaneous at bedtime  insulin lispro (ADMELOG) corrective regimen sliding scale   SubCutaneous every 6 hours  norepinephrine Infusion 0.05 MICROgram(s)/kG/Min (7.78 mL/Hr) IV Continuous <Continuous>  pantoprazole  Injectable 40 milliGRAM(s) IV Push daily  propofol Infusion 10 MICROgram(s)/kG/Min (4.98 mL/Hr) IV Continuous <Continuous>  ticagrelor 90 milliGRAM(s) Oral every 12 hours    MEDICATIONS  (PRN):  acetaminophen     Tablet .. 650 milliGRAM(s) Oral every 6 hours PRN Temp greater or equal to 38C (100.4F)  dextrose Oral Gel 15 Gram(s) Oral once PRN Blood Glucose LESS THAN 70 milliGRAM(s)/deciliter  heparin   Injectable 6500 Unit(s) IV Push every 6 hours PRN For aPTT less than 40  heparin   Injectable 3000 Unit(s) IV Push every 6 hours PRN For aPTT between 40 - 57      ALLERGIES:  Allergies    No Known Allergies    Intolerances        LABS:                        11.5   16.50 )-----------( 228      ( 02 Jul 2022 03:20 )             37.9     07-02    136  |  99  |  61<H>  ----------------------------<  284<H>  4.0   |  24  |  2.40<H>    Ca    8.5      02 Jul 2022 11:30  Phos  4.7     07-02  Mg     2.6     07-02    TPro  6.5  /  Alb  3.0<L>  /  TBili  1.9<H>  /  DBili  x   /  AST  6083<H>  /  ALT  5077<H>  /  AlkPhos  132<H>  07-02    PT/INR - ( 02 Jul 2022 03:20 )   PT: 24.2 sec;   INR: 2.05 ratio         PTT - ( 02 Jul 2022 09:45 )  PTT:55.4 sec      RADIOLOGY & ADDITIONAL TESTS: Reviewed.   BEDSIDE LUNG ULTRASOUND: Diffuse B Lines  BEDSIDE ECHO: IVC: 2.2 cm with minimal variation     CENTRAL LINE: RIJ        DATE INSERTED:               PETERSON: Y                       DATE INSERTED: 7/2                A-LINE: N                       DATE INSERTED:                    GLOBAL ISSUE/BEST PRACTICE  Analgesia: Fentanyl   Sedation: Propofol   HOB elevation: yes  Stress ulcer prophylaxis: Protonix   VTE prophylaxis: Heparin drip  Glycemic control: Y  Nutrition: Tube Feeds     CODE STATUS: Full Code ****** CHARTING IN PROGRESS *******    INTERVAL HPI/OVERNIGHT EVENTS: Overnight, pt was agitated and tachypneic, was given Ativan and started on BIPAP. Pt was in respiratory distress, repeat CXR with worsening R infiltrate and pulmonary edema and pt was given Lasix 40 mg IVP x1. Pt had increase WOB, RR in the 40-50s and was brought to the ICU and emergently intubated, placed on Levophed, Fentanyl, Propofol. Pt had labs done which revealed transaminitis, elevated proBNP 17,155 and lactate 13.4.   Pt was given Bumex 2 mg IVP x1 this AM.     SUBJECTIVE: Patient seen and examined at bedside.     ROS: Unable to obtain as pt is intubated     OBJECTIVE:    VITAL SIGNS:  ICU Vital Signs Last 24 Hrs  T(C): 37.8 (02 Jul 2022 09:00), Max: 37.8 (02 Jul 2022 09:00)  T(F): 100 (02 Jul 2022 09:00), Max: 100 (02 Jul 2022 09:00)  HR: 84 (02 Jul 2022 14:00) (71 - 98)  BP: 96/54 (02 Jul 2022 14:00) (78/39 - 134/74)  BP(mean): 72 (02 Jul 2022 14:00) (53 - 86)  ABP: --  ABP(mean): --  RR: 18 (02 Jul 2022 14:00) (0 - 39)  SpO2: 94% (02 Jul 2022 14:00) (91% - 100%)    Mode: AC/ CMV (Assist Control/ Continuous Mandatory Ventilation), RR (machine): 26, TV (machine): 450, FiO2: 50, PEEP: 8, ITime: 1, MAP: 12, PIP: 26    07-01 @ 07:01  -  07-02 @ 07:00  --------------------------------------------------------  IN: 1002.5 mL / OUT: 1590 mL / NET: -587.6 mL    07-02 @ 07:01  -  07-02 @ 14:19  --------------------------------------------------------  IN: 339.9 mL / OUT: 600 mL / NET: -260.1 mL      CAPILLARY BLOOD GLUCOSE      POCT Blood Glucose.: 280 mg/dL (02 Jul 2022 12:01)      PHYSICAL EXAM:  General: NAD  HEENT: NCAT, PERRL, clear conjunctiva  Respiratory: diminished BS BL   Cardiovascular: RRR, normal S1S2, no M/R/G  Abdomen: soft, NT/ND, bowel sounds in all four quadrants, no palpable masses, obese  Extremities: no clubbing, cyanosis, or edema    MEDICATIONS:  MEDICATIONS  (STANDING):  aspirin  chewable 81 milliGRAM(s) Oral daily  cefTRIAXone   IVPB      chlorhexidine 0.12% Liquid 15 milliLiter(s) Oral Mucosa every 12 hours  chlorhexidine 2% Cloths 1 Application(s) Topical <User Schedule>  dextrose 5%. 1000 milliLiter(s) (50 mL/Hr) IV Continuous <Continuous>  dextrose 5%. 1000 milliLiter(s) (100 mL/Hr) IV Continuous <Continuous>  dextrose 50% Injectable 25 Gram(s) IV Push once  dextrose 50% Injectable 12.5 Gram(s) IV Push once  dextrose 50% Injectable 25 Gram(s) IV Push once  fentaNYL   Infusion. 0.5 MICROgram(s)/kG/Hr (4.15 mL/Hr) IV Continuous <Continuous>  furosemide   Injectable 40 milliGRAM(s) IV Push daily  glucagon  Injectable 1 milliGRAM(s) IntraMuscular once  heparin  Infusion. 1200 Unit(s)/Hr (12 mL/Hr) IV Continuous <Continuous>  insulin glargine Injectable (LANTUS) 26 Unit(s) SubCutaneous at bedtime  insulin lispro (ADMELOG) corrective regimen sliding scale   SubCutaneous every 6 hours  norepinephrine Infusion 0.05 MICROgram(s)/kG/Min (7.78 mL/Hr) IV Continuous <Continuous>  pantoprazole  Injectable 40 milliGRAM(s) IV Push daily  propofol Infusion 10 MICROgram(s)/kG/Min (4.98 mL/Hr) IV Continuous <Continuous>  ticagrelor 90 milliGRAM(s) Oral every 12 hours    MEDICATIONS  (PRN):  acetaminophen     Tablet .. 650 milliGRAM(s) Oral every 6 hours PRN Temp greater or equal to 38C (100.4F)  dextrose Oral Gel 15 Gram(s) Oral once PRN Blood Glucose LESS THAN 70 milliGRAM(s)/deciliter  heparin   Injectable 6500 Unit(s) IV Push every 6 hours PRN For aPTT less than 40  heparin   Injectable 3000 Unit(s) IV Push every 6 hours PRN For aPTT between 40 - 57      ALLERGIES:  Allergies    No Known Allergies    Intolerances        LABS:                        11.5   16.50 )-----------( 228      ( 02 Jul 2022 03:20 )             37.9     07-02    136  |  99  |  61<H>  ----------------------------<  284<H>  4.0   |  24  |  2.40<H>    Ca    8.5      02 Jul 2022 11:30  Phos  4.7     07-02  Mg     2.6     07-02    TPro  6.5  /  Alb  3.0<L>  /  TBili  1.9<H>  /  DBili  x   /  AST  6083<H>  /  ALT  5077<H>  /  AlkPhos  132<H>  07-02    PT/INR - ( 02 Jul 2022 03:20 )   PT: 24.2 sec;   INR: 2.05 ratio         PTT - ( 02 Jul 2022 09:45 )  PTT:55.4 sec      RADIOLOGY & ADDITIONAL TESTS: Reviewed.   BEDSIDE LUNG ULTRASOUND: Diffuse B Lines  BEDSIDE ECHO: IVC: 2.2 cm with minimal variation     CENTRAL LINE: RIJ        DATE INSERTED:               PETERSON: Y                       DATE INSERTED: 7/2                A-LINE: N                       DATE INSERTED:                    GLOBAL ISSUE/BEST PRACTICE  Analgesia: Fentanyl   Sedation: Propofol   HOB elevation: yes  Stress ulcer prophylaxis: Protonix   VTE prophylaxis: Heparin drip  Glycemic control: Y  Nutrition: Tube Feeds     CODE STATUS: Full Code INTERVAL HPI/OVERNIGHT EVENTS: Overnight, pt was agitated and tachypneic, was given Ativan and started on BIPAP. Pt was in respiratory distress, repeat CXR with worsening R infiltrate and pulmonary edema and pt was given Lasix 40 mg IVP x1. Pt had increase WOB, RR in the 40-50s and was brought to the ICU and emergently intubated, placed on Levophed, Fentanyl, Propofol. Pt had labs done which revealed transaminitis, elevated proBNP 17,155 and lactate 13.4.   Pt was given Bumex 2 mg IVP x1 this AM.     SUBJECTIVE: Patient seen and examined at bedside.     ROS: Unable to obtain as pt is intubated     OBJECTIVE:    VITAL SIGNS:  ICU Vital Signs Last 24 Hrs  T(C): 37.8 (02 Jul 2022 09:00), Max: 37.8 (02 Jul 2022 09:00)  T(F): 100 (02 Jul 2022 09:00), Max: 100 (02 Jul 2022 09:00)  HR: 84 (02 Jul 2022 14:00) (71 - 98)  BP: 96/54 (02 Jul 2022 14:00) (78/39 - 134/74)  BP(mean): 72 (02 Jul 2022 14:00) (53 - 86)  ABP: --  ABP(mean): --  RR: 18 (02 Jul 2022 14:00) (0 - 39)  SpO2: 94% (02 Jul 2022 14:00) (91% - 100%)    Mode: AC/ CMV (Assist Control/ Continuous Mandatory Ventilation), RR (machine): 26, TV (machine): 450, FiO2: 50, PEEP: 8, ITime: 1, MAP: 12, PIP: 26    07-01 @ 07:01  -  07-02 @ 07:00  --------------------------------------------------------  IN: 1002.5 mL / OUT: 1590 mL / NET: -587.6 mL    07-02 @ 07:01 - 07-02 @ 14:19  --------------------------------------------------------  IN: 339.9 mL / OUT: 600 mL / NET: -260.1 mL      CAPILLARY BLOOD GLUCOSE      POCT Blood Glucose.: 280 mg/dL (02 Jul 2022 12:01)      PHYSICAL EXAM:  General: NAD  HEENT: NCAT, PERRL, clear conjunctiva  Respiratory: diminished BS BL   Cardiovascular: RRR, normal S1S2, no M/R/G  Abdomen: soft, NT/ND, bowel sounds in all four quadrants, no palpable masses, obese  Extremities: no clubbing, cyanosis, or edema    MEDICATIONS:  MEDICATIONS  (STANDING):  aspirin  chewable 81 milliGRAM(s) Oral daily  cefTRIAXone   IVPB      chlorhexidine 0.12% Liquid 15 milliLiter(s) Oral Mucosa every 12 hours  chlorhexidine 2% Cloths 1 Application(s) Topical <User Schedule>  dextrose 5%. 1000 milliLiter(s) (50 mL/Hr) IV Continuous <Continuous>  dextrose 5%. 1000 milliLiter(s) (100 mL/Hr) IV Continuous <Continuous>  dextrose 50% Injectable 25 Gram(s) IV Push once  dextrose 50% Injectable 12.5 Gram(s) IV Push once  dextrose 50% Injectable 25 Gram(s) IV Push once  fentaNYL   Infusion. 0.5 MICROgram(s)/kG/Hr (4.15 mL/Hr) IV Continuous <Continuous>  furosemide   Injectable 40 milliGRAM(s) IV Push daily  glucagon  Injectable 1 milliGRAM(s) IntraMuscular once  heparin  Infusion. 1200 Unit(s)/Hr (12 mL/Hr) IV Continuous <Continuous>  insulin glargine Injectable (LANTUS) 26 Unit(s) SubCutaneous at bedtime  insulin lispro (ADMELOG) corrective regimen sliding scale   SubCutaneous every 6 hours  norepinephrine Infusion 0.05 MICROgram(s)/kG/Min (7.78 mL/Hr) IV Continuous <Continuous>  pantoprazole  Injectable 40 milliGRAM(s) IV Push daily  propofol Infusion 10 MICROgram(s)/kG/Min (4.98 mL/Hr) IV Continuous <Continuous>  ticagrelor 90 milliGRAM(s) Oral every 12 hours    MEDICATIONS  (PRN):  acetaminophen     Tablet .. 650 milliGRAM(s) Oral every 6 hours PRN Temp greater or equal to 38C (100.4F)  dextrose Oral Gel 15 Gram(s) Oral once PRN Blood Glucose LESS THAN 70 milliGRAM(s)/deciliter  heparin   Injectable 6500 Unit(s) IV Push every 6 hours PRN For aPTT less than 40  heparin   Injectable 3000 Unit(s) IV Push every 6 hours PRN For aPTT between 40 - 57      ALLERGIES:  Allergies    No Known Allergies    Intolerances        LABS:                        11.5   16.50 )-----------( 228      ( 02 Jul 2022 03:20 )             37.9     07-02    136  |  99  |  61<H>  ----------------------------<  284<H>  4.0   |  24  |  2.40<H>    Ca    8.5      02 Jul 2022 11:30  Phos  4.7     07-02  Mg     2.6     07-02    TPro  6.5  /  Alb  3.0<L>  /  TBili  1.9<H>  /  DBili  x   /  AST  6083<H>  /  ALT  5077<H>  /  AlkPhos  132<H>  07-02    PT/INR - ( 02 Jul 2022 03:20 )   PT: 24.2 sec;   INR: 2.05 ratio         PTT - ( 02 Jul 2022 09:45 )  PTT:55.4 sec      RADIOLOGY & ADDITIONAL TESTS: Reviewed.   BEDSIDE LUNG ULTRASOUND: Diffuse B Lines  BEDSIDE ECHO: IVC: 2.2 cm with minimal variation     CENTRAL LINE: RIJ        DATE INSERTED:               PETERSON: Y                       DATE INSERTED: 7/2                A-LINE: N                       DATE INSERTED:                    GLOBAL ISSUE/BEST PRACTICE  Analgesia: Fentanyl   Sedation: Propofol   HOB elevation: yes  Stress ulcer prophylaxis: Protonix   VTE prophylaxis: Heparin drip  Glycemic control: Y  Nutrition: Tube Feeds     CODE STATUS: Full Code INTERVAL HPI/OVERNIGHT EVENTS: Overnight, pt was agitated and tachypneic, was given Ativan and started on BIPAP. Pt was in respiratory distress, repeat CXR with worsening R infiltrate and pulmonary edema and pt was given Lasix 40 mg IVP x1. Pt had increase WOB, RR in the 40-50s and was brought to the ICU and emergently intubated, placed on Levophed, Fentanyl, Propofol. Pt had labs done which revealed transaminitis, elevated proBNP 17,155 and lactate 13.4.   Pt was given Bumex 2 mg IVP x1 this AM.     SUBJECTIVE: Patient seen and examined at bedside.     ROS: Unable to obtain as pt is intubated     OBJECTIVE:    VITAL SIGNS:  ICU Vital Signs Last 24 Hrs  T(C): 37.8 (02 Jul 2022 09:00), Max: 37.8 (02 Jul 2022 09:00)  T(F): 100 (02 Jul 2022 09:00), Max: 100 (02 Jul 2022 09:00)  HR: 84 (02 Jul 2022 14:00) (71 - 98)  BP: 96/54 (02 Jul 2022 14:00) (78/39 - 134/74)  BP(mean): 72 (02 Jul 2022 14:00) (53 - 86)  ABP: --  ABP(mean): --  RR: 18 (02 Jul 2022 14:00) (0 - 39)  SpO2: 94% (02 Jul 2022 14:00) (91% - 100%)    Mode: AC/ CMV (Assist Control/ Continuous Mandatory Ventilation), RR (machine): 26, TV (machine): 450, FiO2: 50, PEEP: 8, ITime: 1, MAP: 12, PIP: 26    07-01 @ 07:01  -  07-02 @ 07:00  --------------------------------------------------------  IN: 1002.5 mL / OUT: 1590 mL / NET: -587.6 mL    07-02 @ 07:01 - 07-02 @ 14:19  --------------------------------------------------------  IN: 339.9 mL / OUT: 600 mL / NET: -260.1 mL      CAPILLARY BLOOD GLUCOSE      POCT Blood Glucose.: 280 mg/dL (02 Jul 2022 12:01)      PHYSICAL EXAM:  General: NAD  HEENT: NCAT, PERRL, clear conjunctiva  Respiratory: diminished BS BL   Cardiovascular: RRR, normal S1S2, no M/R/G  Abdomen: soft, NT/ND, bowel sounds in all four quadrants, no palpable masses, obese  Extremities: no clubbing, cyanosis, or edema    MEDICATIONS:  MEDICATIONS  (STANDING):  aspirin  chewable 81 milliGRAM(s) Oral daily  cefTRIAXone   IVPB      chlorhexidine 0.12% Liquid 15 milliLiter(s) Oral Mucosa every 12 hours  chlorhexidine 2% Cloths 1 Application(s) Topical <User Schedule>  dextrose 5%. 1000 milliLiter(s) (50 mL/Hr) IV Continuous <Continuous>  dextrose 5%. 1000 milliLiter(s) (100 mL/Hr) IV Continuous <Continuous>  dextrose 50% Injectable 25 Gram(s) IV Push once  dextrose 50% Injectable 12.5 Gram(s) IV Push once  dextrose 50% Injectable 25 Gram(s) IV Push once  fentaNYL   Infusion. 0.5 MICROgram(s)/kG/Hr (4.15 mL/Hr) IV Continuous <Continuous>  furosemide   Injectable 40 milliGRAM(s) IV Push daily  glucagon  Injectable 1 milliGRAM(s) IntraMuscular once  heparin  Infusion. 1200 Unit(s)/Hr (12 mL/Hr) IV Continuous <Continuous>  insulin glargine Injectable (LANTUS) 26 Unit(s) SubCutaneous at bedtime  insulin lispro (ADMELOG) corrective regimen sliding scale   SubCutaneous every 6 hours  norepinephrine Infusion 0.05 MICROgram(s)/kG/Min (7.78 mL/Hr) IV Continuous <Continuous>  pantoprazole  Injectable 40 milliGRAM(s) IV Push daily  propofol Infusion 10 MICROgram(s)/kG/Min (4.98 mL/Hr) IV Continuous <Continuous>  ticagrelor 90 milliGRAM(s) Oral every 12 hours    MEDICATIONS  (PRN):  acetaminophen     Tablet .. 650 milliGRAM(s) Oral every 6 hours PRN Temp greater or equal to 38C (100.4F)  dextrose Oral Gel 15 Gram(s) Oral once PRN Blood Glucose LESS THAN 70 milliGRAM(s)/deciliter  heparin   Injectable 6500 Unit(s) IV Push every 6 hours PRN For aPTT less than 40  heparin   Injectable 3000 Unit(s) IV Push every 6 hours PRN For aPTT between 40 - 57      ALLERGIES:  Allergies    No Known Allergies    Intolerances        LABS:                        11.5   16.50 )-----------( 228      ( 02 Jul 2022 03:20 )             37.9     07-02    136  |  99  |  61<H>  ----------------------------<  284<H>  4.0   |  24  |  2.40<H>    Ca    8.5      02 Jul 2022 11:30  Phos  4.7     07-02  Mg     2.6     07-02    TPro  6.5  /  Alb  3.0<L>  /  TBili  1.9<H>  /  DBili  x   /  AST  6083<H>  /  ALT  5077<H>  /  AlkPhos  132<H>  07-02    PT/INR - ( 02 Jul 2022 03:20 )   PT: 24.2 sec;   INR: 2.05 ratio         PTT - ( 02 Jul 2022 09:45 )  PTT:55.4 sec      RADIOLOGY & ADDITIONAL TESTS: Reviewed.   BEDSIDE LUNG ULTRASOUND: Diffuse B Lines  BEDSIDE ECHO: IVC: 2.2 cm with novariation , reduced LV function, no  pericardial effusion    CENTRAL LINE: RIJ        DATE INSERTED:               PETERSON: Y                       DATE INSERTED: 7/2                A-LINE: N                       DATE INSERTED:                    GLOBAL ISSUE/BEST PRACTICE  Analgesia: Fentanyl   Sedation: Propofol   HOB elevation: yes  Stress ulcer prophylaxis: Protonix   VTE prophylaxis: Heparin drip  Glycemic control: Y  Nutrition: Tube Feeds     CODE STATUS: Full Code

## 2022-07-02 NOTE — PROGRESS NOTE ADULT - SUBJECTIVE AND OBJECTIVE BOX
Patient is a 77y old  Male who presents with a chief complaint of Substernal chest pain since last night (02 Jul 2022 14:19)      INTERVAL HPI/OVERNIGHT EVENTS:    developed severe shortness of breath last evening with chest pain and hypotension. Transferred back to CCU early this morning and was intubated and placed on respirator    MEDICATIONS  (STANDING):  aspirin  chewable 81 milliGRAM(s) Oral daily  cefTRIAXone   IVPB      chlorhexidine 0.12% Liquid 15 milliLiter(s) Oral Mucosa every 12 hours  chlorhexidine 2% Cloths 1 Application(s) Topical <User Schedule>  dextrose 5%. 1000 milliLiter(s) (50 mL/Hr) IV Continuous <Continuous>  dextrose 5%. 1000 milliLiter(s) (100 mL/Hr) IV Continuous <Continuous>  dextrose 50% Injectable 25 Gram(s) IV Push once  dextrose 50% Injectable 12.5 Gram(s) IV Push once  dextrose 50% Injectable 25 Gram(s) IV Push once  fentaNYL   Infusion. 0.5 MICROgram(s)/kG/Hr (4.15 mL/Hr) IV Continuous <Continuous>  furosemide   Injectable 40 milliGRAM(s) IV Push daily  glucagon  Injectable 1 milliGRAM(s) IntraMuscular once  heparin  Infusion. 1200 Unit(s)/Hr (12 mL/Hr) IV Continuous <Continuous>  insulin glargine Injectable (LANTUS) 26 Unit(s) SubCutaneous at bedtime  insulin lispro (ADMELOG) corrective regimen sliding scale   SubCutaneous every 6 hours  norepinephrine Infusion 0.05 MICROgram(s)/kG/Min (7.78 mL/Hr) IV Continuous <Continuous>  pantoprazole  Injectable 40 milliGRAM(s) IV Push daily  propofol Infusion 10 MICROgram(s)/kG/Min (4.98 mL/Hr) IV Continuous <Continuous>  ticagrelor 90 milliGRAM(s) Oral every 12 hours      MEDICATIONS  (PRN):  acetaminophen     Tablet .. 650 milliGRAM(s) Oral every 6 hours PRN Temp greater or equal to 38C (100.4F)  dextrose Oral Gel 15 Gram(s) Oral once PRN Blood Glucose LESS THAN 70 milliGRAM(s)/deciliter  heparin   Injectable 6500 Unit(s) IV Push every 6 hours PRN For aPTT less than 40  heparin   Injectable 3000 Unit(s) IV Push every 6 hours PRN For aPTT between 40 - 57      Allergies    No Known Allergies    Intolerances        PAST MEDICAL & SURGICAL HISTORY:  Lung cancer, upper lobe, left      Heart attack  1990,2005      Stented coronary artery  six cardiac stents      Sleep apnea  uses c pap at home      HTN (hypertension)      High cholesterol      Diabetes  on metformin, humalog insulin pump      Insulin pump in place      Hiatal hernia      Vertigo      Stented coronary artery  Drug eluding x 6 stents      Type 2 diabetes mellitus      TIA (transient ischemic attack)  Jan 2014      Chronic back pain      Spinal stenosis in cervical region      Spinal stenosis of lumbar region      Lumbar herniated disc      OA (osteoarthritis)      CHF (congestive heart failure)      Cellulitis      S/P partial lobectomy of lung  lobectomy of left upper lobe. April 2014      History of throat surgery  1996      S/P angioplasty with stent  2005 and 2006          Vital Signs Last 24 Hrs  T(C): 37.8 (02 Jul 2022 09:00), Max: 37.8 (02 Jul 2022 09:00)  T(F): 100 (02 Jul 2022 09:00), Max: 100 (02 Jul 2022 09:00)  HR: 84 (02 Jul 2022 16:10) (71 - 98)  BP: 93/50 (02 Jul 2022 15:00) (78/39 - 134/74)  BP(mean): 67 (02 Jul 2022 15:00) (53 - 86)  RR: 10 (02 Jul 2022 15:00) (0 - 39)  SpO2: 93% (02 Jul 2022 16:10) (91% - 100%)    PHYSICAL EXAMINATION:    GENERAL: The patient is sedated and intubated on respirator    HEENT: Head is normocephalic and atraumatic.    NECK: No JVD    LUNGS: rales both bases    HEART: Regular rate and rhythm without murmur.    ABDOMEN: Soft, nontender, and nondistended.      EXTREMITIES: 1+ bilateral pedal edema    NEUROLOGIC: intubated and sedated    SKIN: No ulceration or induration present.      LABS:                        11.5   16.50 )-----------( 228      ( 02 Jul 2022 03:20 )             37.9     07-02    136  |  99  |  61<H>  ----------------------------<  284<H>  4.0   |  24  |  2.40<H>    Ca    8.5      02 Jul 2022 11:30  Phos  4.7     07-02  Mg     2.6     07-02    TPro  6.5  /  Alb  3.0<L>  /  TBili  1.9<H>  /  DBili  x   /  AST  6083<H>  /  ALT  5077<H>  /  AlkPhos  132<H>  07-02    PT/INR - ( 02 Jul 2022 03:20 )   PT: 24.2 sec;   INR: 2.05 ratio         PTT - ( 02 Jul 2022 09:45 )  PTT:55.4 sec    ABG - ( 02 Jul 2022 04:39 )  pH, Arterial: 7.35  pH, Blood: x     /  pCO2: 36    /  pO2: 176   / HCO3: 20    / Base Excess: -5.7  /  SaO2: 99.2                    Serum Pro-Brain Natriuretic Peptide: 61081 pg/mL (07-02-22 @ 00:36)  Serum Pro-Brain Natriuretic Peptide: 4283 pg/mL (06-30-22 @ 09:20)    Lactate, Blood: 5.1 mmol/L (07-02-22 @ 11:30)  Lactate, Blood: 6.0 mmol/L (07-02-22 @ 06:24)  Lactate, Blood: 9.7 mmol/L (07-02-22 @ 03:20)  Lactate, Blood: 13.4 mmol/L (07-02-22 @ 00:36)    Procalcitonin, Serum: 0.11 ng/mL (06-30-22 @ 09:20)      MICROBIOLOGY:  Culture Results:   <10,000 CFU/mL Normal Urogenital Janiya (06-30 @ 10:00)  Culture Results:   No growth to date. (06-30 @ 09:15)  Culture Results:   No growth to date. (06-30 @ 09:05)        Assessment:    Acute Hypoxic respiratory failure  Acute on Chronic Systolic congestive heart failure  Cardiogenic Shock  Sepsis  Coronary artery disease - S/P MI x2, S/P stents  Obstructive sleep apnea syndrome    Plan:    Mechanical ventilatory support  Cardiac monitoring  Sedation as needed  Empiric antibiotics  Continue Pressors

## 2022-07-02 NOTE — CHART NOTE - NSCHARTNOTEFT_GEN_A_CORE
Called by RN for pt agitated and screaming in pain. At bedside pt tachypneic and agitated. Pt with JIMENEZ. CXR yesterday morning diffuse b/l infiltrates, CT chest reviewed.       T(C): 37 (07-01-22 @ 20:17), Max: 37 (07-01-22 @ 20:17)  HR: 98 (07-01-22 @ 20:17) (81 - 98)  BP: 134/74 (07-01-22 @ 20:17) (91/50 - 134/74)  RR: 20 (07-01-22 @ 20:17) (20 - 41)  SpO2: 92% (07-01-22 @ 20:17) (87% - 100%)  Wt(kg): --    Physical :  Gen- moderate respiratory distress  Cardio - s+1,s+2, rrr, no murmur  Lung - diffuse rales     Assessment/Plan  BEKAH AMOR is a 76 yo male brought to the ED because of chest discomfort. Patient reports that he is experiencing substernal chest pain since last night. He reports that the pain has been constant. Feels similar to his previous MIs. Pt reports  that approx 2 months pt had an MI and he reports that he had several stents collapse.  - STAT ABG, cxr, lasix 40mg IVP ordered  - respiratory at bedside, placed on bipap, sating 100%  - Pt remains agitated with increased work of breathing, will try ativan 1mg IVP   - EKG stat   - stat cardiac enzymes   - CXR wet read showing worsening right lobe infiltrates   - Will continue to monitor  - RN to notify of any changes Called by RN for pt agitated and screaming in pain. At bedside pt tachypneic and agitated. Pt with JIMENEZ. CXR yesterday morning diffuse b/l infiltrates, CT chest reviewed. Pt on heparin gtt   Pt downgraded from ICU earlier this evening, as per respiratory therapist pt was NAD and calm at that time. As per RN, pt began becoming agitated after reaching floor.       T(C): 37 (07-01-22 @ 20:17), Max: 37 (07-01-22 @ 20:17)  HR: 98 (07-01-22 @ 20:17) (81 - 98)  BP: 134/74 (07-01-22 @ 20:17) (91/50 - 134/74)  RR: 20 (07-01-22 @ 20:17) (20 - 41)  SpO2: 92% (07-01-22 @ 20:17) (87% - 100%)  Wt(kg): --    Physical :  Gen- moderate respiratory distress  Cardio - s+1,s+2, rrr, no murmur  Lung - diffuse rales, rhonchi     Assessment/Plan  BEKAH AMOR is a 78 yo male brought to the ED because of chest discomfort. Patient reports that he is experiencing substernal chest pain since last night. He reports that the pain has been constant. Feels similar to his previous MIs. Pt reports  that approx 2 months pt had an MI and he reports that he had several stents collapse.  - STAT ABG, cxr  - STAT lasix 40mg IVP ordered.  at this time, no concern for hypotension. JIMENEZ noted however pt appears overloaded requiring diuresis   - STAT cbc, cmp, lactate, mag, phos ordered   - respiratory at bedside, placed on bipap, sating 100%  - Pt remains agitated with increased work of breathing, will try ativan 1mg IVP   - EKG stat   - STAT cardiac enzymes, continue heparin gtt  - CXR wet read showing worsening right lobe infiltrates, suggestive of worsening fluid overload   - Concern for pna given worsening WBC count however pt remains afebrile   - Will continue to monitor  - RN to notify of any changes Called by RN for pt agitated and screaming in pain. At bedside pt tachypneic and agitated. Pt with JIMENEZ. CXR yesterday morning diffuse b/l infiltrates, CT chest reviewed. Pt on heparin gtt   Pt downgraded from ICU earlier this evening, as per respiratory therapist pt was NAD and calm at that time. As per RN, pt began becoming agitated after reaching floor.       T(C): 37 (07-01-22 @ 20:17), Max: 37 (07-01-22 @ 20:17)  HR: 98 (07-01-22 @ 20:17) (81 - 98)  BP: 134/74 (07-01-22 @ 20:17) (91/50 - 134/74)  RR: 20 (07-01-22 @ 20:17) (20 - 41)  SpO2: 92% (07-01-22 @ 20:17) (87% - 100%)  Wt(kg): --    Physical :  Gen- moderate respiratory distress  Cardio - s+1,s+2, rrr, no murmur  Lung - diffuse rales, rhonchi     Assessment/Plan  BEKAH AMOR is a 76 yo male brought to the ED because of chest discomfort. Patient reports that he is experiencing substernal chest pain since last night. He reports that the pain has been constant. Feels similar to his previous MIs. Pt reports  that approx 2 months pt had an MI and he reports that he had several stents collapse.  - STAT ABG, cxr  - STAT lasix 40mg IVP ordered.  at this time, no concern for hypotension. JIMENEZ noted however pt appears overloaded requiring diuresis   - STAT cbc, cmp, lactate, mag, phos ordered   - respiratory at bedside, placed on bipap, sating 100%  - Pt remains agitated with increased work of breathing, will try ativan 1mg IVP   - EKG stat   - STAT cardiac enzymes, continue heparin gtt  - CXR wet read showing worsening right lobe infiltrates, suggestive of worsening fluid overload   - Concern for pna given worsening WBC count however pt remains afebrile   - Pt continued to be tachypneic, RR low 40s. ABG showing   - ICU PA called to bedside to assess need for intubation   - Pt transferred to ICU for urgent intubation, performed successfully  - Mild hematuria noted in laboy however h/h appears stable   - transaminitis now worsening, would avoid hepatoxics  -  - Will continue to monitor  - RN to notify of any changes Called by RN for pt agitated and screaming in pain. At bedside pt tachypneic and agitated. Pt with JIMENEZ. CXR yesterday morning diffuse b/l infiltrates, CT chest reviewed. Pt on heparin gtt   Pt downgraded from ICU earlier this evening, as per respiratory therapist pt was NAD and calm at that time. As per RN, pt began becoming agitated after reaching floor.       T(C): 37 (07-01-22 @ 20:17), Max: 37 (07-01-22 @ 20:17)  HR: 98 (07-01-22 @ 20:17) (81 - 98)  BP: 134/74 (07-01-22 @ 20:17) (91/50 - 134/74)  RR: 20 (07-01-22 @ 20:17) (20 - 41)  SpO2: 92% (07-01-22 @ 20:17) (87% - 100%)  Wt(kg): --    Physical :  Gen- moderate respiratory distress  Cardio - s+1,s+2, rrr, no murmur  Lung - diffuse rales, rhonchi     Assessment/Plan  BEKAH AMOR is a 78 yo male brought to the ED because of chest discomfort. Patient reports that he is experiencing substernal chest pain since last night. He reports that the pain has been constant. Feels similar to his previous MIs. Pt reports  that approx 2 months pt had an MI and he reports that he had several stents collapse.  - STAT ABG, cxr  - STAT lasix 40mg IVP ordered.  at this time, no concern for hypotension. JIMENEZ noted however pt appears overloaded requiring diuresis   - STAT cbc, cmp, lactate, mag, phos, BNP ordered   - respiratory at bedside, placed on bipap, sating 100%  - Pt remains agitated with increased work of breathing, will try ativan 1mg IVP   - EKG stat   - STAT cardiac enzymes, continue heparin gtt  - CXR wet read showing worsening right lobe infiltrates, suggestive of worsening fluid overload   - Concern for pna given worsening WBC count however pt remains afebrile   - Pt continued to be tachypneic, RR low 40s. ABG showing   - ICU PA called to bedside to assess need for intubation   - Pt transferred to ICU for urgent intubation, performed successfully. Pt full code.   - Mild hematuria noted in laboy however h/h appears stable   - transaminitis now worsening, would avoid hepatotoxics   - lactate 13.4, unclear etiology for severe acidosis   - troponins increased 891 -> 1071  - Dr Mccarthy updated, no additional requests at this time  - Further management as per ICU Called by RN for pt agitated and screaming in pain. At bedside pt tachypneic and agitated. Pt with JIMENEZ. CXR and CT chest from yesterday morning reviewed. Pt on heparin gtt.   Pt downgraded from ICU earlier this evening, as per respiratory therapist pt was NAD and calm at that time. As per RN, pt began becoming agitated after reaching floor.         T(C): 37 (07-01-22 @ 20:17), Max: 37 (07-01-22 @ 20:17)  HR: 98 (07-01-22 @ 20:17) (81 - 98)  BP: 134/74 (07-01-22 @ 20:17) (91/50 - 134/74)  RR: 20 (07-01-22 @ 20:17) (20 - 41)  SpO2: 92% (07-01-22 @ 20:17) (87% - 100%)  Wt(kg): --    Physical :  Gen- moderate respiratory distress, tachypneic, diaphoretic   Cardio - s+1,s+2, rrr, no murmur  Lung - diffuse rales, rhonchi     Assessment/Plan  BEKAH AMOR is a 78 yo male brought to the ED because of chest discomfort. Patient reports that he is experiencing substernal chest pain since last night. He reports that the pain has been constant. Feels similar to his previous MIs. Pt reports  that approx 2 months pt had an MI and he reports that he had several stents collapse.  - Difficulty obtaining pulse ox as pt extremities cold   - STAT ABG, cxr  - STAT lasix 40mg IVP ordered.  at this time, no concern for hypotension. JIMENEZ noted however pt appears overloaded requiring diuresis   - STAT cbc, cmp, lactate, mag, phos, BNP ordered   - respiratory at bedside, placed on bipap, sating 100%.   - Tachy HR 100s  - Pt remains agitated with increased work of breathing, will try ativan 1mg IVP   - EKG stat - grossly unchanged   - STAT cardiac enzymes, continue heparin gtt  - CXR wet read showing worsening right lobe infiltrates, improved left infiltrates, suggestive of worsening fluid overload on right side. Concern for pna given worsening WBC count however pt remains afebrile   - Pt continued to be tachypneic, RR low 40s. ABG showing   - ICU PA called to bedside to assess need for intubation. Pt transferred to ICU for urgent intubation, performed successfully. Pt full code.   - Mild hematuria noted in laboy however h/h appears stable   - transaminitis now worsening, would avoid hepatotoxics   - lactate 13.4, unclear etiology for severe acidosis   - troponins increased 891 -> 1071  - Dr Mccarthy updated, no additional requests at this time  - Further management as per ICU

## 2022-07-02 NOTE — PROGRESS NOTE ADULT - SUBJECTIVE AND OBJECTIVE BOX
Significant recent/past 24 hr events:  - Patient downgraded to telemetry earlier in the evening.  However, developed acute agitation and tachypnea.  He was given lorazepam 1 mg and started on bilevel with working dx of volume overload leading to respiratory distress.  He was given furosemide 40mg IVP x 1.  CXR with worsening R sided infiltrate c/w pulm edema.  ABG drawn shortly thereafter with HAGMA and compensatory respiratory alkalosis.  His WOB worsened with RR in the 40's to 50's.  Decision made to transfer back to ICU for emergent intubation.    Upon transfer to ICU, patient minimally responsive.  Intubation uneventful.  Pink frothy sputum noted during insertion of ETT.  Lab work returned with marked transaminitis, troponin 1k, BNP 17K.   to Return to ICU for acute decompensated HF, acute hypoxemic RF 2/2 cardiogenic pulmonary edema,  congestive hepatopathy, worsening JIMENEZ, HAGMA.    Subjective:    Review of Systems         [ ] A ten-point review of systems was otherwise negative except as noted.  [x ] Due to altered mental status/intubation, subjective information were not able to be obtained from the patient. History was obtained, to the extent possible, from review of the chart and collateral sources of information.      Patient is a 77y old  Male who presents with a chief complaint of Substernal chest pain since last night (2022 14:46)    HPI:  BEKAH AMOR is a 76 yo male brought to the ED because of chest discomfort. Patient reports that he is experiencing substernal chest pain since last night. He reports that the pain has been constant. Feels similar to his previous MIs. Pt reports  that approx 2 months pt had an MI and he reports that he had several stents collapse. Denies fever, chills, N/V, abd pain. Pt does note some tremors, SOB and a non productive.   PMH of Cellulitis CHF (congestive heart failure) Chronic back pain Diabetes on metformin, Humalog insulin pump Heart attack , Hiatal hernia High cholesterol HTN (hypertension) Insulin pump in place Lumbar herniated disc Lung cancer, upper lobe, left OA (osteoarthritis) Sleep apnea uses c pap at home Spinal stenosis in cervical region Spinal stenosis of lumbar region Stented coronary artery Drug eluding x 6 stents TIA (transient ischemic attack) 2014 Vertigo.    (2022 19:45)    PAST MEDICAL & SURGICAL HISTORY:  Lung cancer, upper lobe, left      Heart attack  ,      Stented coronary artery  six cardiac stents      Sleep apnea  uses c pap at home      HTN (hypertension)      High cholesterol      Diabetes  on metformin, humalog insulin pump      Insulin pump in place      Hiatal hernia      Vertigo      Stented coronary artery  Drug eluding x 6 stents      Type 2 diabetes mellitus      TIA (transient ischemic attack)  2014      Chronic back pain      Spinal stenosis in cervical region      Spinal stenosis of lumbar region      Lumbar herniated disc      OA (osteoarthritis)      CHF (congestive heart failure)      Cellulitis      S/P partial lobectomy of lung  lobectomy of left upper lobe. 2014      History of throat surgery  1996      S/P angioplasty with stent   and         FAMILY HISTORY:  No pertinent family history in first degree relatives        Vitals   ICU Vital Signs Last 24 Hrs  T(C): 37 (2022 20:17), Max: 37 (2022 20:17)  T(F): 98.6 (2022 20:17), Max: 98.6 (2022 20:17)  HR: 85 (2022 01:30) (81 - 98)  BP: 134/74 (2022 20:17) (91/50 - 134/74)  BP(mean): 86 (2022 18:00) (67 - 86)  ABP: --  ABP(mean): --  RR: 20 (2022 20:17) (20 - 41)  SpO2: 98% (2022 01:30) (87% - 100%)      Physical Exam:   Constitutional: Severe respiratory distress  HEENT: PERRLA, EOMI, no drainage or redness  Neck: supple,  No JVD, Trachea midline  Back: Normal spine flexure, No CVA tenderness, No deformity or limitation of movement  Respiratory: Breath Sounds diminished bilaterally to auscultation, severe accessory muscle use noted  Cardiovascular: Regular rate, regular rhythm, normal S1, S2; no murmurs or rub  Gastrointestinal: Soft, non-tender, slightly distended, obese, no hepatosplenomegaly, normal bowel sounds  Extremities: ALEMAN x 4, +2 peripheral edema, no cyanosis, no clubbing   Vascular: Equal and normal pulses: 2+ peripheral pulses throughout  Neurological: Lethargic  Musculoskeletal: No joint swelling or deformity; no limitation of movement  Skin: cool, diaphoretic, no rashes    VENT SETTINGS   Mode: AC/ CMV (Assist Control/ Continuous Mandatory Ventilation)  RR (machine): 26  TV (machine): 480  FiO2: 100  PEEP: 8  ITime: 1  MAP: 16  PIP: 23    ABG - ( 2022 01:55 )  pH, Arterial: 7.33  pH, Blood: x     /  pCO2: 30    /  pO2: 74    / HCO3: 16    / Base Excess: -10.1 /  SaO2: 94.7                I&O's Detail    2022 07:01  -  2022 07:00  --------------------------------------------------------  IN:    Heparin Infusion: 76 mL    Heparin Infusion: 60 mL    Norepinephrine: 40.2 mL    Oral Fluid: 60 mL  Total IN: 236.2 mL    OUT:    Indwelling Catheter - Urethral (mL): 1200 mL    Voided (mL): 800 mL  Total OUT: 2000 mL    Total NET: -1763.8 mL      2022 07:01  -  2022 02:50  --------------------------------------------------------  IN:    Heparin Infusion: 204 mL    Oral Fluid: 360 mL  Total IN: 564 mL    OUT:    Indwelling Catheter - Urethral (mL): 1050 mL    Norepinephrine: 0 mL  Total OUT: 1050 mL    Total NET: -486 mL          LABS                        11.9   16.63 )-----------( 243      ( 2022 00:36 )             41.1     07-02    135  |  97  |  48<H>  ----------------------------<  251<H>  4.4   |  16<L>  |  2.50<H>    Ca    9.1      2022 00:36  Phos  5.6     07-02  Mg     3.0     07-02    TPro  7.5  /  Alb  3.5  /  TBili  3.0<H>  /  DBili  x   /  AST  1275<H>  /  ALT  1381<H>  /  AlkPhos  137<H>  07    LIVER FUNCTIONS - ( 2022 00:36 )  Alb: 3.5 g/dL / Pro: 7.5 g/dL / ALK PHOS: 137 U/L / ALT: 1381 U/L / AST: 1275 U/L / GGT: x           PT/INR - ( 2022 09:20 )   PT: 13.1 sec;   INR: 1.12 ratio         PTT - ( 2022 15:51 )  PTT:62.8 sec        Urinalysis Basic - ( 2022 10:00 )    Color: Yellow / Appearance: Clear / S.010 / pH: x  Gluc: x / Ketone: Negative  / Bili: Negative / Urobili: Negative   Blood: x / Protein: Negative / Nitrite: Negative   Leuk Esterase: Negative / RBC: x / WBC x   Sq Epi: x / Non Sq Epi: x / Bacteria: x      POCT Blood Glucose.: 253 mg/dL *H* (22 @ 22:29)  POCT Blood Glucose.: 267 mg/dL *H* (22 @ 17:38)  POCT Blood Glucose.: 330 mg/dL *H* (22 @ 11:13)  POCT Blood Glucose.: 226 mg/dL *H* (22 @ 08:09)  POCT Blood Glucose.: 183 mg/dL *H* (22 @ 05:26)        MEDICATIONS  (STANDING):  aspirin  chewable 81 milliGRAM(s) Oral daily  atorvastatin 40 milliGRAM(s) Oral at bedtime  chlorhexidine 0.12% Liquid 15 milliLiter(s) Oral Mucosa every 12 hours  chlorhexidine 2% Cloths 1 Application(s) Topical <User Schedule>  dextrose 5%. 1000 milliLiter(s) (50 mL/Hr) IV Continuous <Continuous>  dextrose 5%. 1000 milliLiter(s) (100 mL/Hr) IV Continuous <Continuous>  dextrose 50% Injectable 25 Gram(s) IV Push once  dextrose 50% Injectable 12.5 Gram(s) IV Push once  dextrose 50% Injectable 25 Gram(s) IV Push once  fentaNYL   Infusion. 0.5 MICROgram(s)/kG/Hr (4.15 mL/Hr) IV Continuous <Continuous>  furosemide   Injectable 40 milliGRAM(s) IV Push daily  glucagon  Injectable 1 milliGRAM(s) IntraMuscular once  heparin  Infusion. 1200 Unit(s)/Hr (12 mL/Hr) IV Continuous <Continuous>  insulin glargine Injectable (LANTUS) 26 Unit(s) SubCutaneous at bedtime  insulin lispro (ADMELOG) corrective regimen sliding scale   SubCutaneous every 6 hours  norepinephrine Infusion 0.05 MICROgram(s)/kG/Min (7.78 mL/Hr) IV Continuous <Continuous>  pantoprazole  Injectable 40 milliGRAM(s) IV Push daily  propofol Infusion 10 MICROgram(s)/kG/Min (4.98 mL/Hr) IV Continuous <Continuous>    MEDICATIONS  (PRN):  acetaminophen     Tablet .. 650 milliGRAM(s) Oral every 6 hours PRN Temp greater or equal to 38C (100.4F)  dextrose Oral Gel 15 Gram(s) Oral once PRN Blood Glucose LESS THAN 70 milliGRAM(s)/deciliter  heparin   Injectable 6500 Unit(s) IV Push every 6 hours PRN For aPTT less than 40  heparin   Injectable 3000 Unit(s) IV Push every 6 hours PRN For aPTT between 40 - 57      Allergies:  No Known Allergies        CRITICAL CARE TIME SPENT:  60 minutes of critical care time spent providing medical care for patient's acute illness/conditions that impairs at least one vital organ system and/or poses a high risk of imminent or life threatening deterioration in the patient's condition. It includes time spent evaluating and treating the patient's acute illness as well as time spent reviewing labs, radiology, discussing goals of care with patient and/or patient's family, and discussing the case with a multidisciplinary team, in an effort to prevent further life threatening deterioration or end organ damage. This time is independent of any procedures performed.

## 2022-07-02 NOTE — PROGRESS NOTE ADULT - SUBJECTIVE AND OBJECTIVE BOX
Patient is a 77y old  Male who presents with a chief complaint of Substernal chest pain since last night (02 Jul 2022 14:19)      INTERVAL /OVERNIGHT EVENTS: now intubated    MEDICATIONS  (STANDING):  aspirin  chewable 81 milliGRAM(s) Oral daily  cefTRIAXone   IVPB      chlorhexidine 0.12% Liquid 15 milliLiter(s) Oral Mucosa every 12 hours  chlorhexidine 2% Cloths 1 Application(s) Topical <User Schedule>  dextrose 5%. 1000 milliLiter(s) (50 mL/Hr) IV Continuous <Continuous>  dextrose 5%. 1000 milliLiter(s) (100 mL/Hr) IV Continuous <Continuous>  dextrose 50% Injectable 25 Gram(s) IV Push once  dextrose 50% Injectable 12.5 Gram(s) IV Push once  dextrose 50% Injectable 25 Gram(s) IV Push once  fentaNYL   Infusion. 0.5 MICROgram(s)/kG/Hr (4.15 mL/Hr) IV Continuous <Continuous>  furosemide   Injectable 40 milliGRAM(s) IV Push daily  glucagon  Injectable 1 milliGRAM(s) IntraMuscular once  heparin  Infusion. 1200 Unit(s)/Hr (12 mL/Hr) IV Continuous <Continuous>  insulin glargine Injectable (LANTUS) 26 Unit(s) SubCutaneous at bedtime  insulin lispro (ADMELOG) corrective regimen sliding scale   SubCutaneous every 6 hours  norepinephrine Infusion 0.05 MICROgram(s)/kG/Min (7.78 mL/Hr) IV Continuous <Continuous>  pantoprazole  Injectable 40 milliGRAM(s) IV Push daily  propofol Infusion 10 MICROgram(s)/kG/Min (4.98 mL/Hr) IV Continuous <Continuous>  ticagrelor 90 milliGRAM(s) Oral every 12 hours    MEDICATIONS  (PRN):  acetaminophen     Tablet .. 650 milliGRAM(s) Oral every 6 hours PRN Temp greater or equal to 38C (100.4F)  dextrose Oral Gel 15 Gram(s) Oral once PRN Blood Glucose LESS THAN 70 milliGRAM(s)/deciliter  heparin   Injectable 6500 Unit(s) IV Push every 6 hours PRN For aPTT less than 40  heparin   Injectable 3000 Unit(s) IV Push every 6 hours PRN For aPTT between 40 - 57      Allergies    No Known Allergies    Intolerances        REVIEW OF SYSTEMS:  unable to obtain    Vital Signs Last 24 Hrs  T(C): 37.8 (02 Jul 2022 09:00), Max: 37.8 (02 Jul 2022 09:00)  T(F): 100 (02 Jul 2022 09:00), Max: 100 (02 Jul 2022 09:00)  HR: 84 (02 Jul 2022 16:10) (71 - 98)  BP: 93/50 (02 Jul 2022 15:00) (78/39 - 134/74)  BP(mean): 67 (02 Jul 2022 15:00) (53 - 86)  RR: 10 (02 Jul 2022 15:00) (0 - 39)  SpO2: 93% (02 Jul 2022 16:10) (91% - 100%)    PHYSICAL EXAM:  GENERAL: NAD, well-groomed, well-developed  HEAD:  Atraumatic, Normocephalic  EYES: EOMI, PERRLA, conjunctiva and sclera clear  ENMT: No tonsillar erythema, exudates, or enlargement; Moist mucous membranes, Good dentition, No lesions  NECK: Supple, No JVD, Normal thyroid  NERVOUS SYSTEM:  sedated  CHEST/LUNG: Clear to auscultation bilaterally; No rales, rhonchi, wheezing, or rubs  HEART: Regular rate and rhythm; No murmurs, rubs, or gallops  ABDOMEN: Soft, Nontender, Nondistended; Bowel sounds present  EXTREMITIES:  2+ Peripheral Pulses, No clubbing, cyanosis, or edema  LYMPH: No lymphadenopathy noted  SKIN: No rashes or lesions    LABS:                        11.5   16.50 )-----------( 228      ( 02 Jul 2022 03:20 )             37.9     02 Jul 2022 11:30    136    |  99     |  61     ----------------------------<  284    4.0     |  24     |  2.40     Ca    8.5        02 Jul 2022 11:30  Phos  4.7       02 Jul 2022 11:30  Mg     2.6       02 Jul 2022 11:30    TPro  6.5    /  Alb  3.0    /  TBili  1.9    /  DBili  x      /  AST  6083   /  ALT  5077   /  AlkPhos  132    02 Jul 2022 11:30    PT/INR - ( 02 Jul 2022 03:20 )   PT: 24.2 sec;   INR: 2.05 ratio         PTT - ( 02 Jul 2022 09:45 )  PTT:55.4 sec    CAPILLARY BLOOD GLUCOSE      POCT Blood Glucose.: 280 mg/dL (02 Jul 2022 12:01)  POCT Blood Glucose.: 293 mg/dL (02 Jul 2022 05:50)  POCT Blood Glucose.: 253 mg/dL (01 Jul 2022 22:29)  POCT Blood Glucose.: 267 mg/dL (01 Jul 2022 17:38)      RADIOLOGY & ADDITIONAL TESTS:    Notes Reviewed:  [x ] YES  [ ] NO    Care Discussed with Consultants/Other Providers [x ] YES  [ ] NO

## 2022-07-02 NOTE — PROVIDER CONTACT NOTE (EICU) - SITUATION
Heparin reorder requested by bedside team- for pt on iv heparin, therapeutic PTT
ICU NP at bedside setting up for intubation.

## 2022-07-02 NOTE — PROVIDER CONTACT NOTE (EICU) - ACTION/TREATMENT ORDERED:
heparin reordered
-levophed 8mg in 250mL @ 0.05mcg/kg/min  -propofol 1000mg in 100mL @ 10mcg/kg/min  -urgent portable CXR  -vent orders as requested  -ABG

## 2022-07-02 NOTE — CHART NOTE - NSCHARTNOTEFT_GEN_A_CORE
LLE IO removed w/out any difficulty, tip intact, no signs of hematoma or infections, post removal pressure held w/out any signs of hematoma, pressure dressing applied

## 2022-07-02 NOTE — PROGRESS NOTE ADULT - SUBJECTIVE AND OBJECTIVE BOX
Subjective: vented, sedated. FiO2 50%. On Levophed      MEDICATIONS  (STANDING):  aspirin  chewable 81 milliGRAM(s) Oral daily  cefTRIAXone   IVPB      chlorhexidine 0.12% Liquid 15 milliLiter(s) Oral Mucosa every 12 hours  chlorhexidine 2% Cloths 1 Application(s) Topical <User Schedule>  dextrose 5%. 1000 milliLiter(s) (50 mL/Hr) IV Continuous <Continuous>  dextrose 5%. 1000 milliLiter(s) (100 mL/Hr) IV Continuous <Continuous>  dextrose 50% Injectable 25 Gram(s) IV Push once  dextrose 50% Injectable 12.5 Gram(s) IV Push once  dextrose 50% Injectable 25 Gram(s) IV Push once  fentaNYL   Infusion. 0.5 MICROgram(s)/kG/Hr (4.15 mL/Hr) IV Continuous <Continuous>  furosemide   Injectable 40 milliGRAM(s) IV Push daily  glucagon  Injectable 1 milliGRAM(s) IntraMuscular once  heparin  Infusion. 1200 Unit(s)/Hr (12 mL/Hr) IV Continuous <Continuous>  insulin glargine Injectable (LANTUS) 26 Unit(s) SubCutaneous at bedtime  insulin lispro (ADMELOG) corrective regimen sliding scale   SubCutaneous every 6 hours  norepinephrine Infusion 0.05 MICROgram(s)/kG/Min (7.78 mL/Hr) IV Continuous <Continuous>  pantoprazole  Injectable 40 milliGRAM(s) IV Push daily  propofol Infusion 10 MICROgram(s)/kG/Min (4.98 mL/Hr) IV Continuous <Continuous>  ticagrelor 90 milliGRAM(s) Oral every 12 hours    MEDICATIONS  (PRN):  acetaminophen     Tablet .. 650 milliGRAM(s) Oral every 6 hours PRN Temp greater or equal to 38C (100.4F)  dextrose Oral Gel 15 Gram(s) Oral once PRN Blood Glucose LESS THAN 70 milliGRAM(s)/deciliter  heparin   Injectable 6500 Unit(s) IV Push every 6 hours PRN For aPTT less than 40  heparin   Injectable 3000 Unit(s) IV Push every 6 hours PRN For aPTT between 40 - 57          T(C): 37.4 (07-02-22 @ 04:00), Max: 37.4 (07-02-22 @ 04:00)  HR: 80 (07-02-22 @ 10:30) (71 - 98)  BP: 93/51 (07-02-22 @ 10:30) (78/39 - 134/74)  RR: 10 (07-02-22 @ 10:30) (0 - 39)  SpO2: 93% (07-02-22 @ 10:30) (92% - 100%)  Wt(kg): --    ABG - ( 02 Jul 2022 04:39 )  pH, Arterial: 7.35  pH, Blood: x     /  pCO2: 36    /  pO2: 176   / HCO3: 20    / Base Excess: -5.7  /  SaO2: 99.2                I&O's Detail    01 Jul 2022 07:01  -  02 Jul 2022 07:00  --------------------------------------------------------  IN:    FentaNYL: 37.4 mL    Heparin Infusion: 288 mL    Norepinephrine: 194.6 mL    Oral Fluid: 420 mL    Propofol: 62.5 mL  Total IN: 1002.5 mL    OUT:    Indwelling Catheter - Urethral (mL): 1590 mL    Norepinephrine: 0 mL  Total OUT: 1590 mL    Total NET: -587.6 mL      02 Jul 2022 07:01  -  02 Jul 2022 11:09  --------------------------------------------------------  IN:    FentaNYL: 12 mL    Heparin Infusion: 12 mL    Norepinephrine: 46.7 mL    Propofol: 12.5 mL  Total IN: 83.2 mL    OUT:  Total OUT: 0 mL    Total NET: 83.2 mL          Mode: AC/ CMV (Assist Control/ Continuous Mandatory Ventilation)  RR (machine): 26  TV (machine): 450  FiO2: 70  PEEP: 8  ITime: 1  MAP: 14  PIP: 25       PHYSICAL EXAM:    GENERAL: vente, FiO2 50%  CHEST/LUNG: dec BS  HEART: S1S2  ABDOMEN: Soft  EXTREMITIES: 1+ edema      LABS:  CBC Full  -  ( 02 Jul 2022 03:20 )  WBC Count : 16.50 K/uL  RBC Count : 6.02 M/uL  Hemoglobin : 11.5 g/dL  Hematocrit : 37.9 %  Platelet Count - Automated : 228 K/uL  Mean Cell Volume : 63.0 fl  Mean Cell Hemoglobin : 19.1 pg  Mean Cell Hemoglobin Concentration : 30.3 gm/dL  Auto Neutrophil # : 13.78 K/uL  Auto Lymphocyte # : 0.85 K/uL  Auto Monocyte # : 1.33 K/uL  Auto Eosinophil # : 0.16 K/uL  Auto Basophil # : 0.06 K/uL  Auto Neutrophil % : 83.4 %  Auto Lymphocyte % : 5.2 %  Auto Monocyte % : 8.1 %  Auto Eosinophil % : 1.0 %  Auto Basophil % : 0.4 %    07-02    135  |  98  |  56<H>  ----------------------------<  281<H>  5.2   |  22  |  2.60<H>    Ca    8.8      02 Jul 2022 03:20  Phos  4.9     07-02  Mg     2.6     07-02    TPro  6.8  /  Alb  3.2<L>  /  TBili  3.0<H>  /  DBili  x   /  AST  3086<H>  /  ALT  3127<H>  /  AlkPhos  129<H>  07-02    PT/INR - ( 02 Jul 2022 03:20 )   PT: 24.2 sec;   INR: 2.05 ratio         PTT - ( 02 Jul 2022 09:45 )  PTT:55.4 sec      Impression:  JIMENEZ ischemic ATN  CKD3.   Chronic systolic HF, severe CAD, prior numerous PCIs. Not a candidate for CABG  Respiratory failure  Shock, AMI. Suspect cardiogenic etiology      Recommendations:   Consider trial of inotrope.   Monitor Cr on gentle diuresis

## 2022-07-02 NOTE — PROGRESS NOTE ADULT - ASSESSMENT
The patient is a 77 year old male with a history of DM, CAD with prior MI s/p multiple PCI, chronic systolic heart failure s/p CRT-D who presents with chest pain and shortness of breath in the setting of acute systolic heart failure, cardiogenic shock, possible NSTEMI.    7/2/22  Seen at [facility]  Wife and son at bedside  Intubated, on Propofol  WBC-16.50    Plan:  - The patient has a history of multiple PCI including intervention on ISR and is not a candidate for CABG or future PCI  - Troponin peaked at 1360 in the setting of possible NSTEMI vs. heart failure/shock. No need to trend.  - Echo pending  - Continue aspirin 81 mg daily  - Continue ticagrelor 90 mg bid  - Continue heparin drip for duration of 48 hours (discontinue 7/2)  - Hold Entresto, spironolactone, carvedilol due to hypotension/shock and consider re-starting when more stable  - Consider Ranexa for ischemia when taking PO  - Would consider continuing diuresis with furosemide given heart failure symptoms and presence of pulm edema on imaging  - Continue norepinephrine and wean off as tolerated - the patient's baseline blood pressure is systolics 80s-90s  - For now, hold off starting dobutamine but can consider if ongoing shock remains an issue  - The patient is not a candidate for advanced heart failure therapies  - [facility] discussions  - ICU care      31 minutes of critical care time spent with the patient and coordinating care with nursing, hospitalist, and consultants. Patient is critically ill requiring ICU care. The patient is high risk for deterioration and death.

## 2022-07-02 NOTE — CONSULT NOTE ADULT - PROBLEM SELECTOR RECOMMENDATION 9
cont lantus 26 units qhs  recommend change mod dose admelog corrective scale coverage q6hrs while npo  recommend switch to q6hr nph regimen if tube feeds added  goal bg 140-180 in icu setting
Type 2 A1c pending %  insulin pump- medtronic novolog insulin;  CGM- sandro, scanner/micah vials home with son  lantus 22 units HS, low corrective scale and pump to be removed at 12M.   Recommend endocrine, RD  consent form and attestation form- obtained  FU appt- TBA  Diabetes support group info given  Goal 100-180 mg/dL

## 2022-07-03 NOTE — PROGRESS NOTE ADULT - SUBJECTIVE AND OBJECTIVE BOX
HPI: 76 y/o M w/ pmh of DM, hld, htn, lung ca, sleep apnea,  cad x6 stents, HFrEF, BiVICD, recent MI appx 2 m/o ago, presented to [facility] on 6/30/2022 for CP and SOB was admitted to the ICU for cardiogenic shock, JIMENEZ, lactic acidosis and ACS, pt was started on medical management w/ improvement and transferred to floor 07/01/2022, floor courser c/b RRT for acute agitation, tachypnea 2/2 to pna, acute pulm edema resulting in acute hypoxic resp failure thus was transferred back to the MICU requiring intubation now w/ decompensated HF, cardiogenic shock, NSTEMI, w/ JIMENEZ, shock liver and lactic acidosis.    24 hour events: repeat labs tonight shows improvement in LA, shock liver, and cr, given dose of bumex this AM currently making >100cc of urine per hour, will redose W/ Bumex if UOP decrease goal is to keep pt net neg    Review of Systems: Intubated and sedated    T(F): 100.2 (07-02-22 @ 21:00), Max: 102.6 (07-02-22 @ 16:00)  HR: 75 (07-02-22 @ 22:30) (71 - 85)  BP: 102/53 (07-02-22 @ 22:30) (78/39 - 122/60)  RR: 21 (07-02-22 @ 22:30) (0 - 39)  SpO2: 95% (07-02-22 @ 22:30) (91% - 99%)  Wt(kg): --    Mode: AC/ CMV (Assist Control/ Continuous Mandatory Ventilation), RR (machine): 26, TV (machine): 450, FiO2: 50, PEEP: 5  07-01-22 @ 07:01  -  07-02-22 @ 07:00  --------------------------------------------------------  IN: 1002.5 mL / OUT: 1590 mL / NET: -587.6 mL    07-02-22 @ 07:01  -  07-02-22 @ 23:39  --------------------------------------------------------  IN: 1083.4 mL / OUT: 1850 mL / NET: -766.6 mL        CAPILLARY BLOOD GLUCOSE      POCT Blood Glucose.: 199 mg/dL (02 Jul 2022 23:30)      I&O's Summary    01 Jul 2022 07:01  -  02 Jul 2022 07:00  --------------------------------------------------------  IN: 1002.5 mL / OUT: 1590 mL / NET: -587.6 mL    02 Jul 2022 07:01  -  02 Jul 2022 23:39  --------------------------------------------------------  IN: 1083.4 mL / OUT: 1850 mL / NET: -766.6 mL        Physical Exam:   Gen: Comfortable in bed in NAD  Neuro: intubated and sedated  Resp: good air entry b/l  CVS: +RRR  Abd: BSx4, soft, ND  Ext: +edema   Skin: warm/dry    Meds:  cefTRIAXone   IVPB     furosemide   Injectable IV Push  norepinephrine Infusion IV Continuous    dextrose 50% Injectable IV Push  dextrose 50% Injectable IV Push  dextrose 50% Injectable IV Push  dextrose Oral Gel Oral PRN  glucagon  Injectable IntraMuscular  insulin glargine Injectable (LANTUS) SubCutaneous  insulin lispro (ADMELOG) corrective regimen sliding scale SubCutaneous      fentaNYL   Infusion. IV Continuous  propofol Infusion IV Continuous      aspirin  chewable Oral  heparin   Injectable IV Push PRN  heparin   Injectable IV Push PRN  heparin  Infusion. IV Continuous  ticagrelor Oral    pantoprazole  Injectable IV Push      dextrose 5%. IV Continuous  dextrose 5%. IV Continuous      chlorhexidine 0.12% Liquid Oral Mucosa  chlorhexidine 2% Cloths Topical                              11.5   16.50 )-----------( 228      ( 02 Jul 2022 03:20 )             37.9     Bands 4.0    07-02    139  |  103  |  64<H>  ----------------------------<  223<H>  4.0   |  21<L>  |  2.30<H>    Ca    8.5      02 Jul 2022 19:50  Phos  4.7     07-02  Mg     2.6     07-02    TPro  6.5  /  Alb  3.1<L>  /  TBili  2.1<H>  /  DBili  x   /  AST  >2002<H>  /  ALT  >3510<H>  /  AlkPhos  130<H>  07-02    Lactate 4.1           07-02 @ 19:50    Lactate 5.0           07-02 @ 15:25    Lactate 5.1           07-02 @ 11:30    Lactate 6.0           07-02 @ 06:24    Lactate 9.7           07-02 @ 03:20    Lactate 13.4           07-02 @ 00:36          PT/INR - ( 02 Jul 2022 03:20 )   PT: 24.2 sec;   INR: 2.05 ratio         PTT - ( 02 Jul 2022 22:08 )  PTT:89.0 sec    ET Tube ET Tube --   Few Squamous epithelial cells per low power field  Few polymorphonuclear leukocytes per low power field  Few Gram positive cocci in pairs per oil power field 07-02 @ 09:30  Clean Catch Clean Catch (Midstream)   <10,000 CFU/mL Normal Urogenital Janiya -- 06-30 @ 10:00  .Blood Blood-Peripheral   No growth to date. -- 06-30 @ 09:15  .Blood Blood-Peripheral   No growth to date. -- 06-30 @ 09:05    Radiology:     < from: CT Chest No Cont (06.30.22 @ 12:16) >  ACC: [id number] EXAM:  CT CHEST                          PROCEDURE DATE:  06/30/2022          INTERPRETATION:  CLINICAL INFORMATION: Abnormal chest x-ray    COMPARISON: None.    CONTRAST/COMPLICATIONS:  IV Contrast: NONE  Oral Contrast: NONE  Complications: None reported at time of study completion    PROCEDURE:  CT of the Chest was performed.  Sagittal and coronal reformats were performed.    FINDINGS:    LUNGS AND AIRWAYS: Patent central airways.  Prominent subpleural   intralobular septa suggesting interstitial edema. Airspace disease   throughout the right lung may represent pulmonary edema.   Infection/inflammation not excluded.  PLEURA: Small bilateral layering pleural effusions..  MEDIASTINUM AND LINDSAY: No lymphadenopathy.  VESSELS: Within normal limits.  HEART: Heart size is normal. Coronary artery stent.  cardiac pacer. No   pericardial effusion.  CHEST WALL AND LOWER NECK: Within normal limits.  VISUALIZED UPPER ABDOMEN: Small hiatal hernia.  BONES: Degenerative change.    IMPRESSION:  Interstitial edema and bilateral pleural effusions suggest an element of   CHF/fluid overload.  Airspace disease throughout right lung may represent pulmonary edema.   Inflammation/infection not excluded.        --- End of Report ---      JIMY HU MD; Attending Radiologist  This document has been electronically signed. Jun 30 2022 12:34PM    < end of copied text >      CENTRAL LINE: Y  PETERSON: Y  A-LINE: N    GLOBAL ISSUE/BEST PRACTICE:  Analgesia: Y  Sedation: Y  CAM-ICU: N/A  HOB elevation: Y  Stress ulcer prophylaxis: Y  VTE prophylaxis: Y  Glycemic control: Y  Nutrition: Y    CODE STATUS: FULL CODE    CRITICAL CARE TIME SPENT: 40 mins  (Assessing presenting problems of acute illness, which pose high probability of life threatening deterioration or end organ damage/dysfunction, as well as medical decision making including initiating plan of care, reviewing data, reviewing radiologic exams, discussing with multidisciplinary team,  discussing goals of care with patient/family, and writing this note.  Non-inclusive of procedures performed)

## 2022-07-03 NOTE — PROGRESS NOTE ADULT - ASSESSMENT
Encounter Date: 8/23/2018    SCRIBE #1 NOTE: Kelly TOVAR, betzaida scribing for, and in the presence of, Sol Moe NP.       History     Chief Complaint   Patient presents with    Sore Throat      x 4 days following surgical procedure last week        Time seen by provider: 9:20 PM on 08/23/2018    Riya Soto is a 49 y.o. female with chronic neck and back pain and mitral incompetence s/p repair (2011) who presents to the ED with a sore throat onset 4 days. Patient states she feels as if it gets worse at night. She notes that she had injections to her neck and an epidural for chronic pain where she was put under anesthesia just prior to the pain, and does not recall if she was intubated. She complains of a cough and denies any ear pain, congestion, chest pain, abdominal pain, nausea, vomiting, shortness of breath, drooling, difficulty swallowing, or fever. Patient states she took an antibiotic from a previous oral surgery.         The history is provided by the patient. No  was used.     Review of patient's allergies indicates:   Allergen Reactions    Amoxicillin Hives     Past Medical History:   Diagnosis Date    Back pain     Mitral incompetence     Neck pain      Past Surgical History:   Procedure Laterality Date    CARDIAC SURGERY      to repair mitral valve    CHOLECYSTECTOMY       Family History   Family history unknown: Yes     Social History     Tobacco Use    Smoking status: Never Smoker    Smokeless tobacco: Never Used   Substance Use Topics    Alcohol use: No     Alcohol/week: 0.0 oz    Drug use: No     Review of Systems   Constitutional: Negative for fever.   HENT: Positive for sore throat. Negative for drooling, trouble swallowing and voice change.    Eyes: Negative for photophobia and visual disturbance.   Respiratory: Positive for cough. Negative for shortness of breath.    Cardiovascular: Negative for chest pain.   Gastrointestinal: Negative for abdominal pain,  diarrhea, nausea and vomiting.   Genitourinary: Negative for dysuria.   Musculoskeletal: Negative for back pain and neck pain.   Skin: Negative for rash.   Neurological: Negative for weakness and numbness.   Hematological: Does not bruise/bleed easily.       Physical Exam     Initial Vitals [08/23/18 2104]   BP Pulse Resp Temp SpO2   (!) 131/59 77 18 98.3 °F (36.8 °C) 99 %      MAP       --         Physical Exam    Nursing note and vitals reviewed.  Constitutional: Vital signs are normal. She appears well-developed and well-nourished.   HENT:   Head: Normocephalic and atraumatic.   Mouth/Throat: Uvula is midline, oropharynx is clear and moist and mucous membranes are normal. No oropharyngeal exudate, posterior oropharyngeal edema or posterior oropharyngeal erythema.   Eyes: Pupils are equal, round, and reactive to light.   Neck: Trachea normal, normal range of motion, full passive range of motion without pain and phonation normal. Neck supple.   Cardiovascular: Normal rate, regular rhythm, normal heart sounds and intact distal pulses. Exam reveals no gallop and no friction rub.    No murmur heard.  Pulmonary/Chest: Breath sounds normal. She has no wheezes. She has no rhonchi. She has no rales.   Abdominal: Normal appearance.   Neurological: She is alert and oriented to person, place, and time. She has normal strength.   Skin: Skin is warm, dry and intact. Capillary refill takes less than 2 seconds.   Psychiatric: She has a normal mood and affect. Her speech is normal and behavior is normal.         ED Course   Procedures  Labs Reviewed   THROAT SCREEN, RAPID          Imaging Results    None          Medical Decision Making:   History:   Old Medical Records: I decided to obtain old medical records.  Differential Diagnosis:   Viral pharyngitis  Strep pharyngitis  Abrasion    Clinical Tests:   Lab Tests: Ordered and Reviewed       APC / Resident Notes:   Patient is a 49 y.o. female who presents to the ED 08/24/2018 who  underwent emergent evaluation for sore throat. The patient's symptoms are consistent with viral pharyngitis.  Although discussed with patient possible irritation from intubation during surgery earlier this week although there are no signs of trauma.  I do not think the patient has strep pharyngitis based upon negative rapid strep test.   The patient doesn't appear to have any stridor, drooling, hoarseness, uvular deviation, facial swelling, meningismus to suggest peritonsillar abscess, retropharyngeal abscess, epiglotitis, meningitis, airway compromise.  Will treat with supportive care.Based on my clinical evaluation, I do not appreciate any immediate, emergent, or life threatening condition or etiology that warrants additional workup today and feel that the patient can be discharged with close follow up care. Case discussed with Dr. Liu who is agreeable to plan of care. Follow up and return precautions discussed; patient verbalized understanding and is agreeable to plan of care. Patient discharged home in stable condition.                 Scribe Attestation:   Scribe #1: I performed the above scribed service and the documentation accurately describes the services I performed. I attest to the accuracy of the note.    Attending Attestation:           Physician Attestation for Scribe:  Physician Attestation Statement for Scribe #1: I, Sol Moe, reviewed documentation, as scribed by in my presence, and it is both accurate and complete.     Comments: I, DALLAS Taylor, personally performed the services described in this documentation. All medical record entries made by the scribe were at my direction and in my presence.  I have reviewed the chart and agree that the record reflects my personal performance and is accurate and complete. DALLAS Taylor.  3:06 AM 08/24/2018 e               Clinical Impression:   The encounter diagnosis was Viral pharyngitis.      Disposition:   Disposition:  Discharged  Condition: Stable                        Sol Moe NP  08/24/18 [id number]     BEKAH AMOR is a 76 yo male brought to the ED because of chest discomfort. Patient reports that he is experiencing substernal chest pain since last night. He reports that the pain has been constant. Feels similar to his previous MIs. Pt reports  that approx 2 months pt had an MI and he reports that he had several stents collapse.

## 2022-07-03 NOTE — PROVIDER CONTACT NOTE (CRITICAL VALUE NOTIFICATION) - NAME OF MD/NP/PA/DO NOTIFIED:
archana salcedo
SUSANNA Chang
dany Rodriguez
Mariely
CCPA Moody Hunt
Moody MULLINS
SUSANNA Chang
Thanh HINDS
DR. Morrow
GUZMAN Dize
filemon HINDS
dr. dorsey

## 2022-07-03 NOTE — PROVIDER CONTACT NOTE (CRITICAL VALUE NOTIFICATION) - ACTION/TREATMENT ORDERED:
pt placed on NRB
LACTATE TRENDING DOWN
continue plan of care/
no orders
reassess sliding scale, awaiting orders
no PA orders given

## 2022-07-03 NOTE — PROGRESS NOTE ADULT - ASSESSMENT
77M significant PMH CAD s/p EML x 6 (most recent MI 2 mo ago), HFrEF, BiV-ICD, chronic back pain, DM2 on metformin, Hiatal hernia, HLD, HTN, Lung CA, and PAPO originally a/w NSTEMI with cardiogenic shock component and downgraded.  Now return to ICU for ADHF, Cardiogenic shock, acute hypoxemic RF 2/2 cardiogenic pulmonary edema, worsening JIMENEZ and transaminitis.    ADHF  Cardiogenic Shock  Acute hypoxemic RF  Cardiogenic Pulmonary Edema  JIMENEZ  Transaminitis    Neuro:   - Sedated on propofol and fentanyl to facilitate mechanical ventilation; titrate down as clinically appropriate    CV:   - ADHF and Cardiogenic shock; continue norepinephrine to maintain MAP > 65mmHg  - BNP 53187, s/p Bumex 2 mg IVP x1 this AM  - Continue heparin gtt for NSTEMI  - Continue ASA and Brilinta  - Trop: 1071.8-->1740.6    - TTE performed, f/u official read   - Will DC statin given shock liver     Resp:   - Acute hypoxemic respiratory failure 2/2 cardiogenic pulm edema  - Continue lung protective ventilation AC/CMV: 26/480/8/80  - Aggressive chest PT and suctioning    GI:   - NPO with TF  - Worsening transaminitis likely shock liver, will continue to trend   - Statin discontinued     Renal:  - JIMENEZ likely pre renal, improving HAGMA    - Monitor Cr   - Diurese PRN   - Strict I&Os    ID:   - lactate 13.4-->5.1, f/u repeat lactate  - Start Rocephin empirically   - 6/30 BCx x2 negative  - F/u repeat BCx x2, sputum cx, and MRSA PCR   - Monitor WBC and fever     Heme:   - Heparin gtt for NSTEMI    - C/w ASA/brilinta    Endo:  - DMT2 on ISS  and Lantus, will continue    Skin:   - Bryant  - RIJ central line     Dispo:  Critically ill requiring ICU level of care.   77M significant PMH CAD s/p MEL x 6 (most recent MI 2 mo ago), HFrEF, BiV-ICD, chronic back pain, DM2 on metformin, Hiatal hernia, HLD, HTN, Lung CA, and PAPO originally a/w NSTEMI with cardiogenic shock component and downgraded.  Now return to ICU for ADHF, Cardiogenic shock, acute hypoxemic RF 2/2 cardiogenic pulmonary edema, worsening JIMENEZ and transaminitis.    Neuro:   - Sedated on Propofol, will wean down and started on Precedex   - Continue Fentanyl to facilitate mechanical ventilation; titrate down as clinically appropriate    CV:   - ADHF and Cardiogenic shock  - Wean down on Levophed  - Pt may need a trial of inotrope (Dobutamine)   - BNP 00540  - Start Bumex 2 mg IVP BID for diuresis   - Continue heparin gtt for NSTEMI, will STOP at bedtime  - Continue ASA and Brilinta  - Trop: 1071.8-->1740.6-->1573, no need to further trend as trop has peaked    - TTE performed, f/u official read   - Off of statin given shock liver, may restart when appropriate      Resp:   - Acute hypoxemic respiratory failure 2/2 cardiogenic pulm edema  - Continue lung protective ventilation AC/CMV: 26/450/5/50  - Aggressive chest PT and suctioning    GI:   - NPO with TF  - Shock liver, transaminitis improving, will continue to trend   - Off of statin given shock liver, may restart when appropriate      Renal:  - JIMENEZ likely pre renal, improving HAGMA    - Monitor Cr, improving    - Start Bumex 2 mg IVP BID for diuresis   - Strict I&Os    ID:   - lactate downtrending, 3.6 today, f/u repeat lactate   - Continue Rocephin  - 6/30 BCx x2 negative  - F/u repeat BCx x2  - Sputum gram stain with GPC, f/u culture result  - MRSA negative   - Monitor WBC and fever     Heme:   - Continue heparin gtt for NSTEMI, will STOP at bedtime  - C/w ASA/brilinta    Endo:  - DMT2 on ISS  - Will increase from Lantus 26u qhs to Lantus 32u qhs   - Goal -180    Skin:   - Bryant  - RIJ central line     Dispo:  Critically ill requiring ICU level of care.   77M significant PMH CAD s/p MEL x 6 (most recent MI 2 mo ago), HFrEF, BiV-ICD, chronic back pain, DM2 on metformin, Hiatal hernia, HLD, HTN, Lung CA, and PAPO originally a/w NSTEMI with cardiogenic shock component and downgraded.  Now return to ICU for ADHF, Cardiogenic shock, acute hypoxemic RF 2/2 cardiogenic pulmonary edema, worsening JIMENEZ and transaminitis.    Neuro:   - Sedated on Propofol, will wean down and started on Precedex   - Continue Fentanyl to facilitate mechanical ventilation; titrate down as clinically appropriate    CV:   - ADHF and Cardiogenic shock  - Wean down on Levophed  - Pt may need a trial of inotrope (Dobutamine)   - BNP 34980  - Start Bumex 2 mg IVP BID for diuresis   - Continue heparin gtt for NSTEMI, will STOP at bedtime  - Continue ASA and Brilinta  - Trop: 1071.8-->1740.6-->1573, no need to further trend as trop has peaked    - TTE performed, f/u official read   - Off of statin given shock liver, may restart when appropriate      Resp:   - Acute hypoxemic respiratory failure 2/2 cardiogenic pulm edema  - Continue lung protective ventilation AC/CMV: 26/450/5/50  - CXR 7/2 with RUL consolidation as per personal read, f/u official read   - Aggressive chest PT and suctioning    GI:   - NPO with TF  - Shock liver, transaminitis improving, will continue to trend   - Off of statin given shock liver, may restart when appropriate      Renal:  - JIMENEZ likely pre renal, improving HAGMA    - Monitor Cr, improving    - Start Bumex 2 mg IVP BID for diuresis   - Strict I&Os    ID:   - lactate downtrending, 3.6 today, f/u repeat lactate   - Continue Rocephin  - 6/30 BCx x2 negative  - F/u repeat BCx x2  - Sputum gram stain with GPC, f/u culture result  - MRSA negative   - Monitor WBC and fever     Heme:   - Continue heparin gtt for NSTEMI, will STOP at bedtime  - C/w ASA/brilinta    Endo:  - DMT2 on ISS  - Will increase from Lantus 26u qhs to Lantus 32u qhs   - Goal -180    Skin:   - Bryant  - RIJ central line     Dispo:  Critically ill requiring ICU level of care.   77M significant PMH CAD s/p MEL x 6 (most recent MI 2 mo ago), HFrEF, BiV-ICD, chronic back pain, DM2 on metformin, Hiatal hernia, HLD, HTN, Lung CA, and PAPO originally a/w NSTEMI with cardiogenic shock component and downgraded.  Now return to ICU for ADHF, Cardiogenic shock, acute hypoxemic RF 2/2 cardiogenic pulmonary edema, worsening JIMENEZ and transaminitis.    Neuro:   - Sedated on Propofol, will wean down and started on Precedex   - Continue Fentanyl to facilitate mechanical ventilation; titrate down as clinically appropriate    CV:   - ADHF and Cardiogenic shock  - Wean down on Levophed  - Pt may need a trial of inotrope (Dobutamine)   - BNP 25171  - Start Bumex 2 mg IVP BID for diuresis   - Continue heparin gtt for NSTEMI, will STOP at bedtime  - Continue ASA and Brilinta  - Trop: 1071.8-->1740.6-->1573, no need to further trend as trop has peaked    - TTE performed, f/u official read   - Off of statin given shock liver, may restart when appropriate      Resp:   - Acute hypoxemic respiratory failure 2/2 cardiogenic pulm edema  - Continue lung protective ventilation AC/CMV: 26/450/5/50  - CXR 7/2 with RUL consolidation as per personal read, f/u official read   - Aggressive chest PT and suctioning    GI:   - NPO with TF  - Shock liver, transaminitis improving, will continue to trend   - Off of statin given shock liver, may restart when appropriate      Renal:  - JIMENEZ likely pre renal, improving HAGMA    - Monitor Cr, improving    - Start Bumex 2 mg IVP BID for diuresis   - Strict I&Os    ID:   - lactate downtrending, 3.6 today, f/u repeat lactate   - Continue Rocephin  - 6/30 BCx x2 negative  - F/u repeat BCx x2  - Sputum gram stain with GPC, f/u culture result  - MRSA negative   - Monitor WBC and fever     Heme:   - Continue heparin gtt for NSTEMI, will STOP at bedtime  - C/w ASA/brilinta    Endo:  - DMT2 on ISS  - Will increase from Lantus 26u qhs to Lantus 32u qhs   - Goal -180    Skin:   - Bryant  - RIJ central line     Dispo:  Critically ill requiring ICU level of care.      Wife updated at bedside.

## 2022-07-03 NOTE — PROGRESS NOTE ADULT - SUBJECTIVE AND OBJECTIVE BOX
****** CHARTING IN PROGRESS *******    INTERVAL HPI/OVERNIGHT EVENTS: No acute overnight events. Shock is improving.     SUBJECTIVE: Patient seen and examined at bedside.     ROS: Unable to obtain as pt is intubated     OBJECTIVE:    VITAL SIGNS:  ICU Vital Signs Last 24 Hrs  T(C): 38 (03 Jul 2022 04:00), Max: 39.2 (02 Jul 2022 16:00)  T(F): 100.4 (03 Jul 2022 04:00), Max: 102.6 (02 Jul 2022 16:00)  HR: 74 (03 Jul 2022 06:30) (74 - 85)  BP: 94/52 (03 Jul 2022 06:30) (83/51 - 115/58)  BP(mean): 69 (03 Jul 2022 06:30) (62 - 84)  ABP: --  ABP(mean): --  RR: 26 (03 Jul 2022 06:30) (2 - 27)  SpO2: 97% (03 Jul 2022 06:30) (91% - 98%)    Mode: AC/ CMV (Assist Control/ Continuous Mandatory Ventilation), RR (machine): 26, TV (machine): 450, FiO2: 50, PEEP: 5, ITime: 1, MAP: 10, PIP: 23    07-02 @ 07:01  -  07-03 @ 07:00  --------------------------------------------------------  IN: 1955.5 mL / OUT: 2920 mL / NET: -964.5 mL      CAPILLARY BLOOD GLUCOSE      POCT Blood Glucose.: 203 mg/dL (03 Jul 2022 05:11)      PHYSICAL EXAM:  General: NAD  HEENT: NCAT, PERRL, clear conjunctiva  Respiratory: diminished BS BL   Cardiovascular: RRR, normal S1S2, no M/R/G  Abdomen: soft, NT/ND, bowel sounds in all four quadrants, no palpable masses, obese  Extremities: no clubbing, cyanosis, or edema    MEDICATIONS:  MEDICATIONS  (STANDING):  aspirin  chewable 81 milliGRAM(s) Oral daily  cefTRIAXone   IVPB      cefTRIAXone   IVPB 1000 milliGRAM(s) IV Intermittent every 24 hours  chlorhexidine 0.12% Liquid 15 milliLiter(s) Oral Mucosa every 12 hours  chlorhexidine 2% Cloths 1 Application(s) Topical <User Schedule>  dextrose 5%. 1000 milliLiter(s) (50 mL/Hr) IV Continuous <Continuous>  dextrose 5%. 1000 milliLiter(s) (100 mL/Hr) IV Continuous <Continuous>  dextrose 50% Injectable 25 Gram(s) IV Push once  dextrose 50% Injectable 12.5 Gram(s) IV Push once  dextrose 50% Injectable 25 Gram(s) IV Push once  fentaNYL   Infusion. 0.5 MICROgram(s)/kG/Hr (4.15 mL/Hr) IV Continuous <Continuous>  glucagon  Injectable 1 milliGRAM(s) IntraMuscular once  heparin  Infusion. 850 Unit(s)/Hr (8.5 mL/Hr) IV Continuous <Continuous>  insulin glargine Injectable (LANTUS) 26 Unit(s) SubCutaneous at bedtime  insulin lispro (ADMELOG) corrective regimen sliding scale   SubCutaneous every 6 hours  norepinephrine Infusion 0.05 MICROgram(s)/kG/Min (7.78 mL/Hr) IV Continuous <Continuous>  pantoprazole  Injectable 40 milliGRAM(s) IV Push daily  propofol Infusion 10 MICROgram(s)/kG/Min (4.98 mL/Hr) IV Continuous <Continuous>  ticagrelor 90 milliGRAM(s) Oral every 12 hours    MEDICATIONS  (PRN):  dextrose Oral Gel 15 Gram(s) Oral once PRN Blood Glucose LESS THAN 70 milliGRAM(s)/deciliter  heparin   Injectable 6500 Unit(s) IV Push every 6 hours PRN For aPTT less than 40  heparin   Injectable 3000 Unit(s) IV Push every 6 hours PRN For aPTT between 40 - 57      ALLERGIES:  Allergies    No Known Allergies    Intolerances        LABS:                        11.6   10.18 )-----------( 190      ( 03 Jul 2022 06:20 )             37.3     07-02    139  |  103  |  64<H>  ----------------------------<  223<H>  4.0   |  21<L>  |  2.30<H>    Ca    8.5      02 Jul 2022 19:50  Phos  4.7     07-02  Mg     2.6     07-02    TPro  6.5  /  Alb  3.1<L>  /  TBili  2.1<H>  /  DBili  x   /  AST  >2002<H>  /  ALT  >3510<H>  /  AlkPhos  130<H>  07-02    PT/INR - ( 03 Jul 2022 06:20 )   PT: 20.9 sec;   INR: 1.78 ratio         PTT - ( 03 Jul 2022 06:20 )  PTT:56.5 sec      RADIOLOGY & ADDITIONAL TESTS: Reviewed.   BEDSIDE LUNG ULTRASOUND: ***  BEDSIDE ECHO: ***    CENTRAL LINE: RIJ        DATE INSERTED:               PETERSON: Y                       DATE INSERTED: 7/2                A-LINE: N                       DATE INSERTED:                    GLOBAL ISSUE/BEST PRACTICE  Analgesia: Fentanyl   Sedation: Propofol   HOB elevation: yes  Stress ulcer prophylaxis: Protonix   VTE prophylaxis: Heparin drip  Glycemic control: Y  Nutrition: Tube Feeds     CODE STATUS: Full Code INTERVAL HPI/OVERNIGHT EVENTS: Pt was febrile tmax 102.6 F at 16:00 and febrile this AM with temp 100.8 F. Shock is improving.     SUBJECTIVE: Patient seen and examined at bedside.     ROS: Unable to obtain as pt is intubated     OBJECTIVE:    VITAL SIGNS:  ICU Vital Signs Last 24 Hrs  T(C): 38 (03 Jul 2022 04:00), Max: 39.2 (02 Jul 2022 16:00)  T(F): 100.4 (03 Jul 2022 04:00), Max: 102.6 (02 Jul 2022 16:00)  HR: 74 (03 Jul 2022 06:30) (74 - 85)  BP: 94/52 (03 Jul 2022 06:30) (83/51 - 115/58)  BP(mean): 69 (03 Jul 2022 06:30) (62 - 84)  ABP: --  ABP(mean): --  RR: 26 (03 Jul 2022 06:30) (2 - 27)  SpO2: 97% (03 Jul 2022 06:30) (91% - 98%)    Mode: AC/ CMV (Assist Control/ Continuous Mandatory Ventilation), RR (machine): 26, TV (machine): 450, FiO2: 50, PEEP: 5, ITime: 1, MAP: 10, PIP: 23    07-02 @ 07:01  -  07-03 @ 07:00  --------------------------------------------------------  IN: 1955.5 mL / OUT: 2920 mL / NET: -964.5 mL      CAPILLARY BLOOD GLUCOSE      POCT Blood Glucose.: 203 mg/dL (03 Jul 2022 05:11)      PHYSICAL EXAM:  General: NAD  HEENT: NCAT, PERRL, clear conjunctiva  Respiratory: diminished BS BL with rhonchi    Cardiovascular: RRR, normal S1S2, no M/R/G  Abdomen: soft, NT/ND, bowel sounds in all four quadrants, no palpable masses, obese  Extremities: no clubbing, cyanosis, or edema, BLE cool to touch     MEDICATIONS:  MEDICATIONS  (STANDING):  aspirin  chewable 81 milliGRAM(s) Oral daily  cefTRIAXone   IVPB      cefTRIAXone   IVPB 1000 milliGRAM(s) IV Intermittent every 24 hours  chlorhexidine 0.12% Liquid 15 milliLiter(s) Oral Mucosa every 12 hours  chlorhexidine 2% Cloths 1 Application(s) Topical <User Schedule>  dextrose 5%. 1000 milliLiter(s) (50 mL/Hr) IV Continuous <Continuous>  dextrose 5%. 1000 milliLiter(s) (100 mL/Hr) IV Continuous <Continuous>  dextrose 50% Injectable 25 Gram(s) IV Push once  dextrose 50% Injectable 12.5 Gram(s) IV Push once  dextrose 50% Injectable 25 Gram(s) IV Push once  fentaNYL   Infusion. 0.5 MICROgram(s)/kG/Hr (4.15 mL/Hr) IV Continuous <Continuous>  glucagon  Injectable 1 milliGRAM(s) IntraMuscular once  heparin  Infusion. 850 Unit(s)/Hr (8.5 mL/Hr) IV Continuous <Continuous>  insulin glargine Injectable (LANTUS) 26 Unit(s) SubCutaneous at bedtime  insulin lispro (ADMELOG) corrective regimen sliding scale   SubCutaneous every 6 hours  norepinephrine Infusion 0.05 MICROgram(s)/kG/Min (7.78 mL/Hr) IV Continuous <Continuous>  pantoprazole  Injectable 40 milliGRAM(s) IV Push daily  propofol Infusion 10 MICROgram(s)/kG/Min (4.98 mL/Hr) IV Continuous <Continuous>  ticagrelor 90 milliGRAM(s) Oral every 12 hours    MEDICATIONS  (PRN):  dextrose Oral Gel 15 Gram(s) Oral once PRN Blood Glucose LESS THAN 70 milliGRAM(s)/deciliter  heparin   Injectable 6500 Unit(s) IV Push every 6 hours PRN For aPTT less than 40  heparin   Injectable 3000 Unit(s) IV Push every 6 hours PRN For aPTT between 40 - 57      ALLERGIES:  Allergies    No Known Allergies    Intolerances        LABS:                        11.6   10.18 )-----------( 190      ( 03 Jul 2022 06:20 )             37.3     07-02    139  |  103  |  64<H>  ----------------------------<  223<H>  4.0   |  21<L>  |  2.30<H>    Ca    8.5      02 Jul 2022 19:50  Phos  4.7     07-02  Mg     2.6     07-02    TPro  6.5  /  Alb  3.1<L>  /  TBili  2.1<H>  /  DBili  x   /  AST  >2002<H>  /  ALT  >3510<H>  /  AlkPhos  130<H>  07-02    PT/INR - ( 03 Jul 2022 06:20 )   PT: 20.9 sec;   INR: 1.78 ratio         PTT - ( 03 Jul 2022 06:20 )  PTT:56.5 sec      RADIOLOGY & ADDITIONAL TESTS: Reviewed.   BEDSIDE LUNG ULTRASOUND: B lines BL  BEDSIDE ECHO: IVC: 2.4 cm with no variation, reduced LV function, no pericardial effusion     CENTRAL LINE: RIJ        DATE INSERTED:               PETERSON: Y                       DATE INSERTED: 7/2                A-LINE: N                       DATE INSERTED:                    GLOBAL ISSUE/BEST PRACTICE  Analgesia: Fentanyl   Sedation: Propofol   HOB elevation: yes  Stress ulcer prophylaxis: Protonix   VTE prophylaxis: Heparin drip  Glycemic control: Y  Nutrition: Tube Feeds     CODE STATUS: Full Code INTERVAL HPI/OVERNIGHT EVENTS: Pt was febrile tmax 102.6 F at 16:00 and febrile this AM with temp 100.8 F.      SUBJECTIVE: Patient seen and examined at bedside.     ROS: Unable to obtain as pt is intubated     OBJECTIVE:    VITAL SIGNS:  ICU Vital Signs Last 24 Hrs  T(C): 38 (03 Jul 2022 04:00), Max: 39.2 (02 Jul 2022 16:00)  T(F): 100.4 (03 Jul 2022 04:00), Max: 102.6 (02 Jul 2022 16:00)  HR: 74 (03 Jul 2022 06:30) (74 - 85)  BP: 94/52 (03 Jul 2022 06:30) (83/51 - 115/58)  BP(mean): 69 (03 Jul 2022 06:30) (62 - 84)  ABP: --  ABP(mean): --  RR: 26 (03 Jul 2022 06:30) (2 - 27)  SpO2: 97% (03 Jul 2022 06:30) (91% - 98%)    Mode: AC/ CMV (Assist Control/ Continuous Mandatory Ventilation), RR (machine): 26, TV (machine): 450, FiO2: 50, PEEP: 5, ITime: 1, MAP: 10, PIP: 23    07-02 @ 07:01  -  07-03 @ 07:00  --------------------------------------------------------  IN: 1955.5 mL / OUT: 2920 mL / NET: -964.5 mL      CAPILLARY BLOOD GLUCOSE      POCT Blood Glucose.: 203 mg/dL (03 Jul 2022 05:11)      PHYSICAL EXAM:  General: NAD  HEENT: NCAT, PERRL, clear conjunctiva  Respiratory: diminished BS BL with rhonchi    Cardiovascular: RRR, normal S1S2, no M/R/G  Abdomen: soft, NT/ND, bowel sounds in all four quadrants, no palpable masses, obese  Extremities: no clubbing, cyanosis, or edema, BLE cool to touch     MEDICATIONS:  MEDICATIONS  (STANDING):  aspirin  chewable 81 milliGRAM(s) Oral daily  cefTRIAXone   IVPB      cefTRIAXone   IVPB 1000 milliGRAM(s) IV Intermittent every 24 hours  chlorhexidine 0.12% Liquid 15 milliLiter(s) Oral Mucosa every 12 hours  chlorhexidine 2% Cloths 1 Application(s) Topical <User Schedule>  dextrose 5%. 1000 milliLiter(s) (50 mL/Hr) IV Continuous <Continuous>  dextrose 5%. 1000 milliLiter(s) (100 mL/Hr) IV Continuous <Continuous>  dextrose 50% Injectable 25 Gram(s) IV Push once  dextrose 50% Injectable 12.5 Gram(s) IV Push once  dextrose 50% Injectable 25 Gram(s) IV Push once  fentaNYL   Infusion. 0.5 MICROgram(s)/kG/Hr (4.15 mL/Hr) IV Continuous <Continuous>  glucagon  Injectable 1 milliGRAM(s) IntraMuscular once  heparin  Infusion. 850 Unit(s)/Hr (8.5 mL/Hr) IV Continuous <Continuous>  insulin glargine Injectable (LANTUS) 26 Unit(s) SubCutaneous at bedtime  insulin lispro (ADMELOG) corrective regimen sliding scale   SubCutaneous every 6 hours  norepinephrine Infusion 0.05 MICROgram(s)/kG/Min (7.78 mL/Hr) IV Continuous <Continuous>  pantoprazole  Injectable 40 milliGRAM(s) IV Push daily  propofol Infusion 10 MICROgram(s)/kG/Min (4.98 mL/Hr) IV Continuous <Continuous>  ticagrelor 90 milliGRAM(s) Oral every 12 hours    MEDICATIONS  (PRN):  dextrose Oral Gel 15 Gram(s) Oral once PRN Blood Glucose LESS THAN 70 milliGRAM(s)/deciliter  heparin   Injectable 6500 Unit(s) IV Push every 6 hours PRN For aPTT less than 40  heparin   Injectable 3000 Unit(s) IV Push every 6 hours PRN For aPTT between 40 - 57      ALLERGIES:  Allergies    No Known Allergies    Intolerances        LABS:                        11.6   10.18 )-----------( 190      ( 03 Jul 2022 06:20 )             37.3     07-02    139  |  103  |  64<H>  ----------------------------<  223<H>  4.0   |  21<L>  |  2.30<H>    Ca    8.5      02 Jul 2022 19:50  Phos  4.7     07-02  Mg     2.6     07-02    TPro  6.5  /  Alb  3.1<L>  /  TBili  2.1<H>  /  DBili  x   /  AST  >2002<H>  /  ALT  >3510<H>  /  AlkPhos  130<H>  07-02    PT/INR - ( 03 Jul 2022 06:20 )   PT: 20.9 sec;   INR: 1.78 ratio         PTT - ( 03 Jul 2022 06:20 )  PTT:56.5 sec      RADIOLOGY & ADDITIONAL TESTS: Reviewed.   < from: Xray Chest 1 View-PORTABLE IMMEDIATE (Xray Chest 1 View-PORTABLE IMMEDIATE .) (07.02.22 @ 00:16) >  INTERPRETATION:  INDICATION: Rales    COMPARISON: 6/30/22 plain chest; 6/30/22 CT scan of the chest.    Technique: AP radiograph the chest    FINDINGS:  Left-sided pacemaker redemonstrated - with right atrial, right   ventricular and epicardial leads.  Heart/Vascular: Difficult to assess heart size on this portable film.  Pulmonary: Persistent bilateral perihilar infiltrates (improved on the   left and stable on the right) likely due to pulmonary vascular congestion   - but inflammation/infection not excluded. Small bilateral pleural   effusions redemonstrated.  Bones: No acute bony finding.    Impression:  Airspace opacities slightly improved on the left and stable on the right.  Findings may be due to pulmonary edema but underlying   infection/inflammation not excluded.    < end of copied text >      BEDSIDE LUNG ULTRASOUND: B lines BL  BEDSIDE ECHO: IVC: 2.4 cm with no variation, reduced LV function, no pericardial effusion     CENTRAL LINE: RIJ  TLC      DATE INSERTED:               PETERSON: Y                       DATE INSERTED: 7/2                A-LINE: N                       DATE INSERTED:                    GLOBAL ISSUE/BEST PRACTICE  Analgesia: Fentanyl   Sedation: Propofol   HOB elevation: yes  Stress ulcer prophylaxis: Protonix   VTE prophylaxis: Heparin drip  Glycemic control: Y  Nutrition: Tube Feeds     CODE STATUS: Full Code

## 2022-07-03 NOTE — PROGRESS NOTE ADULT - SUBJECTIVE AND OBJECTIVE BOX
Patient is a 77y old  Male who presents with a chief complaint of Substernal chest pain since last night (03 Jul 2022 14:20)      INTERVAL /OVERNIGHT EVENTS: still intubated    MEDICATIONS  (STANDING):  aspirin  chewable 81 milliGRAM(s) Oral daily  buMETAnide Injectable 2 milliGRAM(s) IV Push every 12 hours  cefTRIAXone   IVPB      cefTRIAXone   IVPB 1000 milliGRAM(s) IV Intermittent every 24 hours  chlorhexidine 0.12% Liquid 15 milliLiter(s) Oral Mucosa every 12 hours  chlorhexidine 2% Cloths 1 Application(s) Topical <User Schedule>  dexMEDEtomidine Infusion 0.2 MICROgram(s)/kG/Hr (4.15 mL/Hr) IV Continuous <Continuous>  dextrose 5%. 1000 milliLiter(s) (100 mL/Hr) IV Continuous <Continuous>  dextrose 5%. 1000 milliLiter(s) (50 mL/Hr) IV Continuous <Continuous>  dextrose 50% Injectable 25 Gram(s) IV Push once  dextrose 50% Injectable 12.5 Gram(s) IV Push once  dextrose 50% Injectable 25 Gram(s) IV Push once  fentaNYL   Infusion. 0.5 MICROgram(s)/kG/Hr (4.15 mL/Hr) IV Continuous <Continuous>  glucagon  Injectable 1 milliGRAM(s) IntraMuscular once  heparin  Infusion. 850 Unit(s)/Hr (8.5 mL/Hr) IV Continuous <Continuous>  insulin glargine Injectable (LANTUS) 32 Unit(s) SubCutaneous at bedtime  insulin lispro (ADMELOG) corrective regimen sliding scale   SubCutaneous every 6 hours  norepinephrine Infusion 0.05 MICROgram(s)/kG/Min (7.78 mL/Hr) IV Continuous <Continuous>  pantoprazole  Injectable 40 milliGRAM(s) IV Push daily  propofol Infusion 10 MICROgram(s)/kG/Min (4.98 mL/Hr) IV Continuous <Continuous>  ticagrelor 90 milliGRAM(s) Oral every 12 hours    MEDICATIONS  (PRN):  dextrose Oral Gel 15 Gram(s) Oral once PRN Blood Glucose LESS THAN 70 milliGRAM(s)/deciliter  heparin   Injectable 6500 Unit(s) IV Push every 6 hours PRN For aPTT less than 40  heparin   Injectable 3000 Unit(s) IV Push every 6 hours PRN For aPTT between 40 - 57      Allergies    No Known Allergies    Intolerances        REVIEW OF SYSTEMS:  unable to obtain    Vital Signs Last 24 Hrs  T(C): 38.8 (03 Jul 2022 15:26), Max: 39.2 (02 Jul 2022 16:00)  T(F): 101.8 (03 Jul 2022 15:26), Max: 102.6 (02 Jul 2022 16:00)  HR: 74 (03 Jul 2022 14:00) (71 - 84)  BP: 105/55 (03 Jul 2022 14:00) (83/51 - 146/64)  BP(mean): 74 (03 Jul 2022 14:00) (62 - 84)  RR: 26 (03 Jul 2022 14:00) (2 - 26)  SpO2: 97% (03 Jul 2022 14:00) (92% - 98%)    PHYSICAL EXAM:  GENERAL: NAD, well-groomed, well-developed  HEAD:  Atraumatic, Normocephalic  EYES: EOMI, PERRLA, conjunctiva and sclera clear  ENMT: No tonsillar erythema, exudates, or enlargement; Moist mucous membranes, Good dentition, No lesions  NECK: Supple, No JVD, Normal thyroid  NERVOUS SYSTEM:  ; Motor Strength 5/5 B/L upper and lower extremities; DTRs 2+ intact and symmetric  CHEST/LUNG: Clear to auscultation bilaterally; No rales, rhonchi, wheezing, or rubs  HEART: Regular rate and rhythm; No murmurs, rubs, or gallops  ABDOMEN: Soft, Nontender, Nondistended; Bowel sounds present  EXTREMITIES:  2+ Peripheral Pulses, No clubbing, cyanosis, or edema  LYMPH: No lymphadenopathy noted  SKIN: No rashes or lesions    LABS:                        11.6   10.18 )-----------( 190      ( 03 Jul 2022 06:20 )             37.3     03 Jul 2022 06:20    142    |  105    |  61     ----------------------------<  237    3.9     |  26     |  2.00     Ca    8.8        03 Jul 2022 06:20  Phos  3.2       03 Jul 2022 06:20  Mg     2.7       03 Jul 2022 06:20    TPro  6.3    /  Alb  2.8    /  TBili  2.5    /  DBili  x      /  AST  2627   /  ALT  4205   /  AlkPhos  131    03 Jul 2022 06:20    PT/INR - ( 03 Jul 2022 06:20 )   PT: 20.9 sec;   INR: 1.78 ratio         PTT - ( 03 Jul 2022 13:15 )  PTT:55.8 sec    CAPILLARY BLOOD GLUCOSE      POCT Blood Glucose.: 258 mg/dL (03 Jul 2022 11:50)  POCT Blood Glucose.: 203 mg/dL (03 Jul 2022 05:11)  POCT Blood Glucose.: 199 mg/dL (02 Jul 2022 23:30)  POCT Blood Glucose.: 207 mg/dL (02 Jul 2022 17:13)      RADIOLOGY & ADDITIONAL TESTS:    Notes Reviewed:  [x ] YES  [ ] NO    Care Discussed with Consultants/Other Providers [x ] YES  [ ] NO

## 2022-07-03 NOTE — PROVIDER CONTACT NOTE (CRITICAL VALUE NOTIFICATION) - PERSON GIVING RESULT:
erik
Lab Katharine Bower
Lab Anuradha Adrian
ME
LAB/ Meredith Shannon
Lab Yuni Titus
paty
Anuradha
Y[facility]-lab
paty lab
kameron newman
self

## 2022-07-03 NOTE — PROGRESS NOTE ADULT - ASSESSMENT
The patient is a 77 year old male with a history of DM, CAD with prior MI s/p multiple PCI, chronic systolic heart failure s/p CRT-D who presents with chest pain and shortness of breath in the setting of acute systolic heart failure, cardiogenic shock, possible NSTEMI.    7/3/22  Seen at [facility] ICU  Intubated, on Propofol  WBC-10.18  BUN/Creat-61/2.00  Bili-2.5   Alk phos-131  SGOT-2627     SGPT-4205      Plan:  - The patient has a history of multiple PCI including intervention on ISR and is not a candidate for CABG or future PCI  - Troponin peaked at 1360 in the setting of possible NSTEMI vs. heart failure/shock. No need to trend.  - Echo pending  - Continue aspirin 81 mg daily  - Continue ticagrelor 90 mg bid  - Continue heparin drip for duration of 48 hours (discontinue 7/2)  - Hold Entresto, spironolactone, carvedilol due to hypotension/shock and consider re-starting when more stable  - Consider Ranexa for ischemia when taking PO  - Would consider continuing diuresis with furosemide given heart failure symptoms and presence of pulm edema on imaging  - Continue norepinephrine and wean off as tolerated - the patient's baseline blood pressure is systolics 80s-90s  - For now, hold off starting dobutamine but can consider if ongoing shock remains an issue  - The patient is not a candidate for advanced heart failure therapies  - [facility] discussions  - ICU care      31 minutes of critical care time spent with the patient and coordinating care with nursing, hospitalist, and consultants. Patient is critically ill requiring ICU care. The patient is high risk for deterioration and death.

## 2022-07-03 NOTE — PROVIDER CONTACT NOTE (CRITICAL VALUE NOTIFICATION) - SITUATION
admitted with cardiogenic shock/sepsis
critical lactate  level 13.4 and troponin  1071,8
lactate 6.0
Trop 1360  Lactate 2.3
patient

## 2022-07-03 NOTE — PROGRESS NOTE ADULT - ASSESSMENT
76 y/o M w/ pmh of DM, hld, htn, lung ca, sleep apnea,  cad x6 stents, HFrEF, BiVICD, recent MI appx 2 m/o ago, presented to [facility] on 6/30/2022 for CP and SOB was admitted to the ICU for cardiogenic shock, JIMENEZ, lactic acidosis and ACS, pt was started on medical management w/ improvement and transferred to floor 07/01/2022, floor courser c/b RRT for acute agitation, tachypnea 2/2 to pna, acute pulm edema resulting in acute hypoxic resp failure thus was transferred back to the MICU requiring intubation now w/ decompensated HF, cardiogenic shock, NSTEMI, w/ JIMENEZ, shock liver and lactic acidosis.      -Neuro: Intubated and sedated on prop and fentanyl  -Cardiac: Cardiogenic shock on levophed, will titrate pressors as needed to maintain MAP >65, CAD/NSTEMI on asa/brilinta and heparin gtt, CE now trending down, per cards notes pt is not a canadiate for CABG/PCI plan is to cont medical management  -Resp: Acute hypoxic resp failure 2/2 to APE cont lung protective ventilation, will titrate vent setting as tolerated to maintain o2 >90%, APE on bumex for diuresis  -GI: Cont TF as ordered/tolerated, GI ppx w/ protonix, Shock liver 2/2 to cardiogenic shock now improving cont to trend LFT  -Renal: JIMENEZ in the setting of shock cont to monitor renal function and lytes, monitor I&O closely  -ID: Pna? on IV rocephin, f/u on final cx data  -Endo: DM on ISS   -Heme: DVT ppx on heparin gtt for NSTEMI, cont heparin gtt per protocol  -Dispo: Pt remains in the MICU currently full code, dispo pending hospital course

## 2022-07-03 NOTE — PROGRESS NOTE ADULT - SUBJECTIVE AND OBJECTIVE BOX
Patient is a 77y Male with a known history of :  Type 2 diabetes mellitus [E11.9]    Acute MI, subendocardial [I21.4]    Lung cancer, upper lobe [C34.10]    Hypertension [I10]    Acute respiratory failure with hypoxia [J96.01]      HPI:  BEKAH AMOR is a 78 yo male brought to the ED because of chest discomfort. Patient reports that he is experiencing substernal chest pain since last night. He reports that the pain has been constant. Feels similar to his previous MIs. Pt reports  that approx 2 months pt had an MI and he reports that he had several stents collapse. Denies fever, chills, N/V, abd pain. Pt does note some tremors, SOB and a non productive.   PMH of Cellulitis CHF (congestive heart failure) Chronic back pain Diabetes on metformin, Humalog insulin pump Heart attack 1990,2005 Hiatal hernia High cholesterol HTN (hypertension) Insulin pump in place Lumbar herniated disc Lung cancer, upper lobe, left OA (osteoarthritis) Sleep apnea uses c pap at home Spinal stenosis in cervical region Spinal stenosis of lumbar region Stented coronary artery Drug eluding x 6 stents TIA (transient ischemic attack) Jan 2014 Vertigo.    (30 Jun 2022 19:45)      REVIEW OF SYSTEMS:    CONSTITUTIONAL: No fever, weight loss, or fatigue  EYES: No eye pain, visual disturbances, or discharge  ENMT:  No difficulty hearing, tinnitus, vertigo; No sinus or throat pain  NECK: No pain or stiffness  BREASTS: No pain, masses, or nipple discharge  RESPIRATORY: No cough, wheezing, chills or hemoptysis; No shortness of breath  CARDIOVASCULAR: No chest pain, palpitations, dizziness, or leg swelling  GASTROINTESTINAL: No abdominal or epigastric pain. No nausea, vomiting, or hematemesis; No diarrhea or constipation. No melena or hematochezia.  GENITOURINARY: No dysuria, frequency, hematuria, or incontinence  NEUROLOGICAL: No headaches, memory loss, loss of strength, numbness, or tremors  SKIN: No itching, burning, rashes, or lesions   LYMPH NODES: No enlarged glands  ENDOCRINE: No heat or cold intolerance; No hair loss  MUSCULOSKELETAL: No joint pain or swelling; No muscle, back, or extremity pain  PSYCHIATRIC: No depression, anxiety, mood swings, or difficulty sleeping  HEME/LYMPH: No easy bruising, or bleeding gums  ALLERGY AND IMMUNOLOGIC: No hives or eczema    MEDICATIONS  (STANDING):  aspirin  chewable 81 milliGRAM(s) Oral daily  cefTRIAXone   IVPB      cefTRIAXone   IVPB 1000 milliGRAM(s) IV Intermittent every 24 hours  chlorhexidine 0.12% Liquid 15 milliLiter(s) Oral Mucosa every 12 hours  chlorhexidine 2% Cloths 1 Application(s) Topical <User Schedule>  dextrose 5%. 1000 milliLiter(s) (50 mL/Hr) IV Continuous <Continuous>  dextrose 5%. 1000 milliLiter(s) (100 mL/Hr) IV Continuous <Continuous>  dextrose 50% Injectable 25 Gram(s) IV Push once  dextrose 50% Injectable 12.5 Gram(s) IV Push once  dextrose 50% Injectable 25 Gram(s) IV Push once  fentaNYL   Infusion. 0.5 MICROgram(s)/kG/Hr (4.15 mL/Hr) IV Continuous <Continuous>  glucagon  Injectable 1 milliGRAM(s) IntraMuscular once  heparin  Infusion. 850 Unit(s)/Hr (8.5 mL/Hr) IV Continuous <Continuous>  insulin glargine Injectable (LANTUS) 26 Unit(s) SubCutaneous at bedtime  insulin lispro (ADMELOG) corrective regimen sliding scale   SubCutaneous every 6 hours  norepinephrine Infusion 0.05 MICROgram(s)/kG/Min (7.78 mL/Hr) IV Continuous <Continuous>  pantoprazole  Injectable 40 milliGRAM(s) IV Push daily  propofol Infusion 10 MICROgram(s)/kG/Min (4.98 mL/Hr) IV Continuous <Continuous>  ticagrelor 90 milliGRAM(s) Oral every 12 hours    MEDICATIONS  (PRN):  dextrose Oral Gel 15 Gram(s) Oral once PRN Blood Glucose LESS THAN 70 milliGRAM(s)/deciliter  heparin   Injectable 6500 Unit(s) IV Push every 6 hours PRN For aPTT less than 40  heparin   Injectable 3000 Unit(s) IV Push every 6 hours PRN For aPTT between 40 - 57      ALLERGIES: No Known Allergies      FAMILY HISTORY:  No pertinent family history in first degree relatives        Social history:  Alochol:   Smoking:   Drug Use:   Marital Status:     PHYSICAL EXAMINATION:  -----------------------------  T(C): 38.2 (07-03-22 @ 08:14), Max: 39.2 (07-02-22 @ 16:00)  HR: 75 (07-03-22 @ 08:05) (74 - 85)  BP: 99/58 (07-03-22 @ 08:00) (83/51 - 115/58)  RR: 26 (07-03-22 @ 08:00) (2 - 27)  SpO2: 98% (07-03-22 @ 08:05) (91% - 98%)  Wt(kg): --    07-02 @ 07:01  -  07-03 @ 07:00  --------------------------------------------------------  IN:    Enteral Tube Flush: 30 mL    FentaNYL: 159.3 mL    Heparin Infusion: 68 mL    Heparin Infusion: 202 mL    Heparin Infusion: 28 mL    IV PiggyBack: 50 mL    Nepro with Carb Steady: 300 mL    Norepinephrine: 525.4 mL    Norepinephrine: 465.3 mL    Propofol: 127.5 mL  Total IN: 1955.5 mL    OUT:    Indwelling Catheter - Urethral (mL): 2920 mL  Total OUT: 2920 mL    Total NET: -964.5 mL      07-03 @ 07:01  -  07-03 @ 08:24  --------------------------------------------------------  IN:    FentaNYL: 6.2 mL    Heparin Infusion: 8.5 mL    IV PiggyBack: 50 mL    Nepro with Carb Steady: 30 mL    Norepinephrine: 35 mL    Propofol: 5 mL  Total IN: 134.7 mL    OUT:  Total OUT: 0 mL    Total NET: 134.7 mL            Constitutional: well developed, normal appearance, well groomed, well nourished, no deformities and no acute distress.   Eyes: the conjunctiva exhibited no abnormalities and the eyelids demonstrated no xanthelasmas.   HEENT: normal oral mucosa, no oral pallor and no oral cyanosis.   Neck: normal jugular venous A waves present, normal jugular venous V waves present and no jugular venous venegas A waves.   Pulmonary: no respiratory distress, normal respiratory rhythm and effort, no accessory muscle use and lungs were clear to auscultation bilaterally. Anteriorly  Cardiovascular: heart rate and rhythm were normal, normal S1 and S2 and no murmur, gallop, rub, heave or thrill are present.   Musculoskeletal: the gait could not be assessed.  Extremities: no clubbing of the fingernails, no localized cyanosis, no petechial hemorrhages and no ischemic changes. B/L trace edema  Skin: normal skin color and pigmentation, no rash, no venous stasis, no skin lesions, no skin ulcer and no xanthoma was observed.       LABS:   --------  07-03    142  |  105  |  61<H>  ----------------------------<  237<H>  3.9   |  26  |  2.00<H>    Ca    8.8      03 Jul 2022 06:20  Phos  3.2     07-03  Mg     2.7     07-03    TPro  6.3  /  Alb  2.8<L>  /  TBili  2.5<H>  /  DBili  x   /  AST  2627<H>  /  ALT  4205<H>  /  AlkPhos  131<H>  07-03                         11.6   10.18 )-----------( 190      ( 03 Jul 2022 06:20 )             37.3     PT/INR - ( 03 Jul 2022 06:20 )   PT: 20.9 sec;   INR: 1.78 ratio         PTT - ( 03 Jul 2022 06:20 )  PTT:56.5 sec          07-02 @ 09:30    Organism --   Gram Stain Blood -- Gram Stain   Few Squamous epithelial cells per low power field  Few polymorphonuclear leukocytes per low power field  Few Gram positive cocci in pairs per oil power field  Specimen Source ET Tube ET Tube  Culture-Blood --        Radiology:

## 2022-07-03 NOTE — PROGRESS NOTE ADULT - SUBJECTIVE AND OBJECTIVE BOX
Patient is a 77y old  Male who presents with a chief complaint of Substernal chest pain since last night (03 Jul 2022 10:32)      INTERVAL HPI/OVERNIGHT EVENTS:    sedated on ventilator    MEDICATIONS  (STANDING):  aspirin  chewable 81 milliGRAM(s) Oral daily  buMETAnide Injectable 2 milliGRAM(s) IV Push every 12 hours  cefTRIAXone   IVPB      cefTRIAXone   IVPB 1000 milliGRAM(s) IV Intermittent every 24 hours  chlorhexidine 0.12% Liquid 15 milliLiter(s) Oral Mucosa every 12 hours  chlorhexidine 2% Cloths 1 Application(s) Topical <User Schedule>  dexMEDEtomidine Infusion 0.2 MICROgram(s)/kG/Hr (4.15 mL/Hr) IV Continuous <Continuous>  dextrose 5%. 1000 milliLiter(s) (100 mL/Hr) IV Continuous <Continuous>  dextrose 5%. 1000 milliLiter(s) (50 mL/Hr) IV Continuous <Continuous>  dextrose 50% Injectable 25 Gram(s) IV Push once  dextrose 50% Injectable 12.5 Gram(s) IV Push once  dextrose 50% Injectable 25 Gram(s) IV Push once  fentaNYL   Infusion. 0.5 MICROgram(s)/kG/Hr (4.15 mL/Hr) IV Continuous <Continuous>  glucagon  Injectable 1 milliGRAM(s) IntraMuscular once  heparin  Infusion. 850 Unit(s)/Hr (8.5 mL/Hr) IV Continuous <Continuous>  insulin glargine Injectable (LANTUS) 32 Unit(s) SubCutaneous at bedtime  insulin lispro (ADMELOG) corrective regimen sliding scale   SubCutaneous every 6 hours  norepinephrine Infusion 0.05 MICROgram(s)/kG/Min (7.78 mL/Hr) IV Continuous <Continuous>  pantoprazole  Injectable 40 milliGRAM(s) IV Push daily  propofol Infusion 10 MICROgram(s)/kG/Min (4.98 mL/Hr) IV Continuous <Continuous>  ticagrelor 90 milliGRAM(s) Oral every 12 hours      MEDICATIONS  (PRN):  dextrose Oral Gel 15 Gram(s) Oral once PRN Blood Glucose LESS THAN 70 milliGRAM(s)/deciliter  heparin   Injectable 6500 Unit(s) IV Push every 6 hours PRN For aPTT less than 40  heparin   Injectable 3000 Unit(s) IV Push every 6 hours PRN For aPTT between 40 - 57      Allergies    No Known Allergies    Intolerances        PAST MEDICAL & SURGICAL HISTORY:  Lung cancer, upper lobe, left      Heart attack  1990,2005      Stented coronary artery  six cardiac stents      Sleep apnea  uses c pap at home      HTN (hypertension)      High cholesterol      Diabetes  on metformin, humalog insulin pump      Insulin pump in place      Hiatal hernia      Vertigo      Stented coronary artery  Drug eluding x 6 stents      Type 2 diabetes mellitus      TIA (transient ischemic attack)  Jan 2014      Chronic back pain      Spinal stenosis in cervical region      Spinal stenosis of lumbar region      Lumbar herniated disc      OA (osteoarthritis)      CHF (congestive heart failure)      Cellulitis      S/P partial lobectomy of lung  lobectomy of left upper lobe. April 2014      History of throat surgery  1996      S/P angioplasty with stent  2005 and 2006          Vital Signs Last 24 Hrs  T(C): 38.2 (03 Jul 2022 08:14), Max: 39.2 (02 Jul 2022 16:00)  T(F): 100.8 (03 Jul 2022 08:14), Max: 102.6 (02 Jul 2022 16:00)  HR: 74 (03 Jul 2022 14:00) (71 - 85)  BP: 105/55 (03 Jul 2022 14:00) (83/51 - 146/64)  BP(mean): 74 (03 Jul 2022 14:00) (62 - 84)  RR: 26 (03 Jul 2022 14:00) (2 - 27)  SpO2: 97% (03 Jul 2022 14:00) (91% - 98%)    PHYSICAL EXAMINATION:    GENERAL: The patient is intubated and sedated.     HEENT: Head is normocephalic and atraumatic    NECK: No JVD    LUNGS: Bilateral wheezes and rhonchi    HEART: Regular rate and rhythm without murmur.    ABDOMEN: Soft, nontender, and nondistended.      EXTREMITIES: Bilateral pedal edema    NEUROLOGIC: Grossly intact.    SKIN: No ulceration or induration present.      LABS:                        11.6   10.18 )-----------( 190      ( 03 Jul 2022 06:20 )             37.3     07-03    142  |  105  |  61<H>  ----------------------------<  237<H>  3.9   |  26  |  2.00<H>    Ca    8.8      03 Jul 2022 06:20  Phos  3.2     07-03  Mg     2.7     07-03    TPro  6.3  /  Alb  2.8<L>  /  TBili  2.5<H>  /  DBili  x   /  AST  2627<H>  /  ALT  4205<H>  /  AlkPhos  131<H>  07-03    PT/INR - ( 03 Jul 2022 06:20 )   PT: 20.9 sec;   INR: 1.78 ratio         PTT - ( 03 Jul 2022 13:15 )  PTT:55.8 sec    ABG - ( 03 Jul 2022 08:52 )  pH, Arterial: 7.46  pH, Blood: x     /  pCO2: 38    /  pO2: 126   / HCO3: 27    / Base Excess: 3.2   /  SaO2: 99.3                    Serum Pro-Brain Natriuretic Peptide: 29661 pg/mL (07-02-22 @ 00:36)    Lactate, Blood: 3.1 mmol/L (07-03-22 @ 13:35)  Lactate, Blood: 3.1 mmol/L (07-03-22 @ 10:15)  Lactate, Blood: 3.6 mmol/L (07-03-22 @ 06:20)  Lactate, Blood: 4.6 mmol/L (07-02-22 @ 23:28)  Lactate, Blood: 4.1 mmol/L (07-02-22 @ 19:50)  Lactate, Blood: 5.0 mmol/L (07-02-22 @ 15:25)        MICROBIOLOGY:  Culture Results:   No growth to date. (07-02 @ 09:20)  Culture Results:   No growth to date. (07-02 @ 09:15)  Culture Results:   <10,000 CFU/mL Normal Urogenital Janiya (06-30 @ 10:00)  Culture Results:   No growth to date. (06-30 @ 09:15)  Culture Results:   No growth to date. (06-30 @ 09:05)          Assessment:    Acute Hypoxic respiratory failure  Acute on Chronic Systolic congestive heart failure  Coronary artery disease - S/P stents  Cardiogenic Shock    Plan:    Mechanical ventilatory support  Cardiac monitoring  Continue pressors  Anticoagulation with IV heparin  Prognosis is guarded

## 2022-07-03 NOTE — PROGRESS NOTE ADULT - SUBJECTIVE AND OBJECTIVE BOX
Subjective: vented, FiO2 40%. Still on Levophed.       MEDICATIONS  (STANDING):  aspirin  chewable 81 milliGRAM(s) Oral daily  cefTRIAXone   IVPB      cefTRIAXone   IVPB 1000 milliGRAM(s) IV Intermittent every 24 hours  chlorhexidine 0.12% Liquid 15 milliLiter(s) Oral Mucosa every 12 hours  chlorhexidine 2% Cloths 1 Application(s) Topical <User Schedule>  dexMEDEtomidine Infusion 0.2 MICROgram(s)/kG/Hr (4.15 mL/Hr) IV Continuous <Continuous>  dextrose 5%. 1000 milliLiter(s) (100 mL/Hr) IV Continuous <Continuous>  dextrose 5%. 1000 milliLiter(s) (50 mL/Hr) IV Continuous <Continuous>  dextrose 50% Injectable 25 Gram(s) IV Push once  dextrose 50% Injectable 12.5 Gram(s) IV Push once  dextrose 50% Injectable 25 Gram(s) IV Push once  fentaNYL   Infusion. 0.5 MICROgram(s)/kG/Hr (4.15 mL/Hr) IV Continuous <Continuous>  glucagon  Injectable 1 milliGRAM(s) IntraMuscular once  heparin  Infusion. 850 Unit(s)/Hr (8.5 mL/Hr) IV Continuous <Continuous>  insulin glargine Injectable (LANTUS) 32 Unit(s) SubCutaneous at bedtime  insulin lispro (ADMELOG) corrective regimen sliding scale   SubCutaneous every 6 hours  norepinephrine Infusion 0.05 MICROgram(s)/kG/Min (7.78 mL/Hr) IV Continuous <Continuous>  pantoprazole  Injectable 40 milliGRAM(s) IV Push daily  propofol Infusion 10 MICROgram(s)/kG/Min (4.98 mL/Hr) IV Continuous <Continuous>  ticagrelor 90 milliGRAM(s) Oral every 12 hours    MEDICATIONS  (PRN):  dextrose Oral Gel 15 Gram(s) Oral once PRN Blood Glucose LESS THAN 70 milliGRAM(s)/deciliter  heparin   Injectable 6500 Unit(s) IV Push every 6 hours PRN For aPTT less than 40  heparin   Injectable 3000 Unit(s) IV Push every 6 hours PRN For aPTT between 40 - 57          T(C): 38.2 (07-03-22 @ 08:14), Max: 39.2 (07-02-22 @ 16:00)  HR: 73 (07-03-22 @ 10:00) (72 - 85)  BP: 146/64 (07-03-22 @ 10:00) (83/51 - 146/64)  RR: 26 (07-03-22 @ 10:00) (2 - 27)  SpO2: 97% (07-03-22 @ 10:00) (91% - 98%)  Wt(kg): --    ABG - ( 03 Jul 2022 08:52 )  pH, Arterial: 7.46  pH, Blood: x     /  pCO2: 38    /  pO2: 126   / HCO3: 27    / Base Excess: 3.2   /  SaO2: 99.3                I&O's Detail    02 Jul 2022 07:01  -  03 Jul 2022 07:00  --------------------------------------------------------  IN:    Enteral Tube Flush: 30 mL    FentaNYL: 159.3 mL    Heparin Infusion: 68 mL    Heparin Infusion: 202 mL    Heparin Infusion: 28 mL    IV PiggyBack: 50 mL    Nepro with Carb Steady: 300 mL    Norepinephrine: 525.4 mL    Norepinephrine: 465.3 mL    Propofol: 127.5 mL  Total IN: 1955.5 mL    OUT:    Indwelling Catheter - Urethral (mL): 2920 mL  Total OUT: 2920 mL    Total NET: -964.5 mL      03 Jul 2022 07:01  -  03 Jul 2022 10:32  --------------------------------------------------------  IN:    FentaNYL: 18.6 mL    Heparin Infusion: 25.5 mL    IV PiggyBack: 50 mL    Nepro with Carb Steady: 90 mL    Norepinephrine: 105 mL    Propofol: 15 mL  Total IN: 304.1 mL    OUT:  Total OUT: 0 mL    Total NET: 304.1 mL          Mode: AC/ CMV (Assist Control/ Continuous Mandatory Ventilation)  RR (machine): 26  TV (machine): 450  FiO2: 40  PEEP: 5  ITime: 1  MAP: 11  PIP: 25       PHYSICAL EXAM:    GENERAL: intubated.   CHEST/LUNG: occ crackles  HEART: S1S2  ABDOMEN: Soft  EXTREMITIES:  some edema      LABS:  CBC Full  -  ( 03 Jul 2022 06:20 )  WBC Count : 10.18 K/uL  RBC Count : 6.02 M/uL  Hemoglobin : 11.6 g/dL  Hematocrit : 37.3 %  Platelet Count - Automated : 190 K/uL  Mean Cell Volume : 62.0 fl  Mean Cell Hemoglobin : 19.3 pg  Mean Cell Hemoglobin Concentration : 31.1 gm/dL  Auto Neutrophil # : 8.14 K/uL  Auto Lymphocyte # : 1.28 K/uL  Auto Monocyte # : 0.60 K/uL  Auto Eosinophil # : 0.04 K/uL  Auto Basophil # : 0.05 K/uL  Auto Neutrophil % : 79.9 %  Auto Lymphocyte % : 12.6 %  Auto Monocyte % : 5.9 %  Auto Eosinophil % : 0.4 %  Auto Basophil % : 0.5 %    07-03    142  |  105  |  61<H>  ----------------------------<  237<H>  3.9   |  26  |  2.00<H>    Ca    8.8      03 Jul 2022 06:20  Phos  3.2     07-03  Mg     2.7     07-03    TPro  6.3  /  Alb  2.8<L>  /  TBili  2.5<H>  /  DBili  x   /  AST  2627<H>  /  ALT  4205<H>  /  AlkPhos  131<H>  07-03    PT/INR - ( 03 Jul 2022 06:20 )   PT: 20.9 sec;   INR: 1.78 ratio         PTT - ( 03 Jul 2022 06:20 )  PTT:56.5 sec      Impression:  JIMENEZ ischemic ATN. Improving.   CKD3.   Chronic systolic HF, severe CAD, prior numerous PCIs. Not a candidate for CABG  Respiratory failure  Shock, AMI. Suspect cardiogenic etiology      Recommendations:   Consider trial of inotrope.   No objection to gentle diuresis to maintain neg fluid balance  Minimize obligatory input.

## 2022-07-03 NOTE — PROVIDER CONTACT NOTE (CRITICAL VALUE NOTIFICATION) - TEST AND RESULT REPORTED:
Lactate-2.1
lactate 3.1
[id number]
Lactate 5.0
trop = 641.8  lactate = 3.9
Lactate 13.4 and troponin 1071.8
Lactate 4.6
lactate 5.1
Lactate 4.1
lactate 6.0
Blood glucose 330

## 2022-07-04 NOTE — PROGRESS NOTE ADULT - SUBJECTIVE AND OBJECTIVE BOX
CAPILLARY BLOOD GLUCOSE      POCT Blood Glucose.: 360 mg/dL (04 Jul 2022 12:23)  POCT Blood Glucose.: 435 mg/dL (04 Jul 2022 12:18)  POCT Blood Glucose.: 382 mg/dL (04 Jul 2022 05:28)  POCT Blood Glucose.: 365 mg/dL (03 Jul 2022 23:33)  POCT Blood Glucose.: 268 mg/dL (03 Jul 2022 17:27)      Vital Signs Last 24 Hrs  T(C): 38.6 (04 Jul 2022 12:23), Max: 39.3 (03 Jul 2022 20:30)  T(F): 101.5 (04 Jul 2022 12:23), Max: 102.8 (03 Jul 2022 20:30)  HR: 78 (04 Jul 2022 11:45) (72 - 86)  BP: 119/59 (04 Jul 2022 11:30) (86/53 - 123/58)  BP(mean): 84 (04 Jul 2022 11:30) (65 - 87)  RR: 28 (04 Jul 2022 11:30) (0 - 34)  SpO2: 97% (04 Jul 2022 11:45) (94% - 98%)    Respiratory: CTA B/L  CV: RRR, S1S2, no murmurs, rubs or gallops  Abdominal: Soft, NT, ND +BS, Last BM  Extremities: No edema, + peripheral pulses     07-04    147<H>  |  106  |  64<H>  ----------------------------<  405<H>  3.5   |  32<H>  |  1.90<H>    Ca    8.9      04 Jul 2022 04:55  Phos  2.4     07-04  Mg     2.6     07-04    TPro  6.9  /  Alb  2.8<L>  /  TBili  2.8<H>  /  DBili  x   /  AST  1741<H>  /  ALT  >3510<H>  /  AlkPhos  186<H>  07-04      dextrose 50% Injectable 25 Gram(s) IV Push once  dextrose 50% Injectable 12.5 Gram(s) IV Push once  dextrose 50% Injectable 25 Gram(s) IV Push once  dextrose Oral Gel 15 Gram(s) Oral once PRN  glucagon  Injectable 1 milliGRAM(s) IntraMuscular once  insulin glargine Injectable (LANTUS) 20 Unit(s) SubCutaneous two times a day  insulin lispro (ADMELOG) corrective regimen sliding scale   SubCutaneous every 6 hours

## 2022-07-04 NOTE — PROGRESS NOTE ADULT - SUBJECTIVE AND OBJECTIVE BOX
HPI: 76 y/o M w/ pmh of DM, hld, htn, lung ca, sleep apnea,  cad x6 stents, HFrEF, BiVICD, recent MI appx 2 m/o ago, presented to [facility] on 6/30/2022 for CP and SOB was admitted to the ICU for cardiogenic shock, JIMENEZ, lactic acidosis and ACS, pt was started on medical management w/ improvement and transferred to floor 07/01/2022, floor courser c/b RRT for acute agitation, tachypnea 2/2 to pna, acute pulm edema resulting in acute hypoxic resp failure thus was transferred back to the MICU requiring intubation now w/ decompensated HF, cardiogenic shock, NSTEMI, w/ JIMENEZ, shock liver and lactic acidosis.    24 hour events:     Review of Systems: Intubated and sedated    T(F): 102.6 (07-04-22 @ 20:40), Max: 102.6 (07-04-22 @ 20:40)  HR: 120 (07-04-22 @ 22:00) (73 - 120)  BP: 127/72 (07-04-22 @ 22:00) (92/55 - 194/94)  RR: 41 (07-04-22 @ 22:00) (0 - 43)  SpO2: 97% (07-04-22 @ 22:00) (96% - 98%)  Wt(kg): --    Mode: AC/ CMV (Assist Control/ Continuous Mandatory Ventilation), RR (machine): 22, TV (machine): 450, FiO2: 40, PEEP: 5  07-03-22 @ 07:01  -  07-04-22 @ 07:00  --------------------------------------------------------  IN: 2532.6 mL / OUT: 5380 mL / NET: -2847.4 mL    07-04-22 @ 07:01  -  07-04-22 @ 22:41  --------------------------------------------------------  IN: 1507.1 mL / OUT: 3450 mL / NET: -1942.9 mL        CAPILLARY BLOOD GLUCOSE      POCT Blood Glucose.: 448 mg/dL (04 Jul 2022 21:14)      I&O's Summary    03 Jul 2022 07:01  -  04 Jul 2022 07:00  --------------------------------------------------------  IN: 2532.6 mL / OUT: 5380 mL / NET: -2847.4 mL    04 Jul 2022 07:01  -  04 Jul 2022 22:41  --------------------------------------------------------  IN: 1507.1 mL / OUT: 3450 mL / NET: -1942.9 mL        Physical Exam:   Gen:  Neuro:  HEENT:  Resp:  CVS:  Abd:  Ext:  Skin:    Meds:  cefTRIAXone   IVPB   cefTRIAXone   IVPB IV Intermittent    buMETAnide Injectable IV Push  DOBUTamine Infusion IV Continuous  norepinephrine Infusion IV Continuous    dextrose 50% Injectable IV Push  dextrose 50% Injectable IV Push  dextrose 50% Injectable IV Push  dextrose Oral Gel Oral PRN  glucagon  Injectable IntraMuscular  insulin glargine Injectable (LANTUS) SubCutaneous  insulin lispro (ADMELOG) corrective regimen sliding scale SubCutaneous      dexMEDEtomidine Infusion IV Continuous  propofol Infusion IV Continuous      aspirin  chewable Oral  heparin   Injectable SubCutaneous  ticagrelor Oral    pantoprazole  Injectable IV Push      dextrose 5%. IV Continuous  dextrose 5%. IV Continuous      chlorhexidine 0.12% Liquid Oral Mucosa  chlorhexidine 2% Cloths Topical                              12.4   10.72 )-----------( 225      ( 04 Jul 2022 04:55 )             40.7       07-04    147<H>  |  106  |  64<H>  ----------------------------<  405<H>  3.5   |  32<H>  |  1.90<H>    Ca    8.9      04 Jul 2022 04:55  Phos  2.4     07-04  Mg     2.6     07-04    TPro  6.9  /  Alb  2.8<L>  /  TBili  2.8<H>  /  DBili  x   /  AST  1741<H>  /  ALT  >3510<H>  /  AlkPhos  186<H>  07-04    Lactate 3.2           07-04 @ 04:55          PT/INR - ( 04 Jul 2022 04:55 )   PT: 18.6 sec;   INR: 1.58 ratio         PTT - ( 04 Jul 2022 04:55 )  PTT:31.3 sec    ET Tube ET Tube   Moderate Moraxella catarrhalis "Susceptibilities not performed"  Normal Respiratory Janiya present   Few Squamous epithelial cells per low power field  Few polymorphonuclear leukocytes per low power field  Few Gram positive cocci in pairs per oil power field 07-02 @ 09:30  .Blood Blood-Peripheral   No growth to date. -- 07-02 @ 09:20  .Blood Blood-Peripheral   No growth to date. -- 07-02 @ 09:15  Clean Catch Clean Catch (Midstream)   <10,000 CFU/mL Normal Urogenital Janiya -- 06-30 @ 10:00  .Blood Blood-Peripheral   No growth to date. -- 06-30 @ 09:15  .Blood Blood-Peripheral   No growth to date. -- 06-30 @ 09:05              Radiology: ***  Bedside ultrasound: ***    CENTRAL LINE: N/Y          DATE INSERTED:              REMOVE: Y/N  PETERSON: N/Y                       DATE INSERTED:              REMOVE: Y/N  A-LINE: N/Y                       DATE INSERTED:              REMOVE: Y/N    GLOBAL ISSUE/BEST PRACTICE:  Analgesia:  Sedation:  CAM-ICU:   HOB elevation: yes  Stress ulcer prophylaxis:  VTE prophylaxis:  Glycemic control:  Nutrition:    CODE STATUS: ***    CRITICAL CARE TIME SPENT:  (Assessing presenting problems of acute illness, which pose high probability of life threatening deterioration or end organ damage/dysfunction, as well as medical decision making including initiating plan of care, reviewing data, reviewing radiologic exams, discussing with multidisciplinary team,  discussing goals of care with patient/family, and writing this note.  Non-inclusive of procedures performed)     HPI: 76 y/o M w/ pmh of DM, hld, htn, lung ca, sleep apnea,  cad x6 stents, HFrEF, BiVICD, recent MI appx 2 m/o ago, presented to [facility] on 6/30/2022 for CP and SOB was admitted to the ICU for cardiogenic shock, JIMENEZ, lactic acidosis and ACS, pt was started on medical management w/ improvement and transferred to floor 07/01/2022, floor courser c/b RRT for acute agitation, tachypnea 2/2 to pna, acute pulm edema resulting in acute hypoxic resp failure thus was transferred back to the MICU requiring intubation now w/ decompensated HF, cardiogenic shock, NSTEMI, w/ JIMENEZ, shock liver and lactic acidosis.    24 hour events: pt now on levo and doub for cardiogenic shock, overnight becoming more hyperglycemia despite IVP of insulin and Lantus pt now started on insulin gtt. pt also continues to spike fevers on cooling blanket unable to give Tylenol 2/2 to shock liver, unable to give NSAIDS 2/2 to JIMENEZ.    Review of Systems: Intubated and sedated    T(F): 102.6 (07-04-22 @ 20:40), Max: 102.6 (07-04-22 @ 20:40)  HR: 120 (07-04-22 @ 22:00) (73 - 120)  BP: 127/72 (07-04-22 @ 22:00) (92/55 - 194/94)  RR: 41 (07-04-22 @ 22:00) (0 - 43)  SpO2: 97% (07-04-22 @ 22:00) (96% - 98%)  Wt(kg): --    Mode: AC/ CMV (Assist Control/ Continuous Mandatory Ventilation), RR (machine): 22, TV (machine): 450, FiO2: 40, PEEP: 5  07-03-22 @ 07:01  -  07-04-22 @ 07:00  --------------------------------------------------------  IN: 2532.6 mL / OUT: 5380 mL / NET: -2847.4 mL    07-04-22 @ 07:01  -  07-04-22 @ 22:41  --------------------------------------------------------  IN: 1507.1 mL / OUT: 3450 mL / NET: -1942.9 mL        CAPILLARY BLOOD GLUCOSE      POCT Blood Glucose.: 448 mg/dL (04 Jul 2022 21:14)      I&O's Summary    03 Jul 2022 07:01  -  04 Jul 2022 07:00  --------------------------------------------------------  IN: 2532.6 mL / OUT: 5380 mL / NET: -2847.4 mL    04 Jul 2022 07:01  -  04 Jul 2022 22:41  --------------------------------------------------------  IN: 1507.1 mL / OUT: 3450 mL / NET: -1942.9 mL        Physical Exam:   Gen: Comfortable in bed in NAD  Neuro: intubated and sedated  Resp: good air entry b/l  CVS: +RRR  Abd: BSx4, soft, ND  Ext: +edema   Skin: warm/dry      Meds:  cefTRIAXone   IVPB   cefTRIAXone   IVPB IV Intermittent    buMETAnide Injectable IV Push  DOBUTamine Infusion IV Continuous  norepinephrine Infusion IV Continuous    dextrose 50% Injectable IV Push  dextrose 50% Injectable IV Push  dextrose 50% Injectable IV Push  dextrose Oral Gel Oral PRN  glucagon  Injectable IntraMuscular  insulin glargine Injectable (LANTUS) SubCutaneous  insulin lispro (ADMELOG) corrective regimen sliding scale SubCutaneous      dexMEDEtomidine Infusion IV Continuous  propofol Infusion IV Continuous      aspirin  chewable Oral  heparin   Injectable SubCutaneous  ticagrelor Oral    pantoprazole  Injectable IV Push      dextrose 5%. IV Continuous  dextrose 5%. IV Continuous      chlorhexidine 0.12% Liquid Oral Mucosa  chlorhexidine 2% Cloths Topical                              12.4   10.72 )-----------( 225      ( 04 Jul 2022 04:55 )             40.7       07-04    147<H>  |  106  |  64<H>  ----------------------------<  405<H>  3.5   |  32<H>  |  1.90<H>    Ca    8.9      04 Jul 2022 04:55  Phos  2.4     07-04  Mg     2.6     07-04    TPro  6.9  /  Alb  2.8<L>  /  TBili  2.8<H>  /  DBili  x   /  AST  1741<H>  /  ALT  >3510<H>  /  AlkPhos  186<H>  07-04    Lactate 3.2           07-04 @ 04:55          PT/INR - ( 04 Jul 2022 04:55 )   PT: 18.6 sec;   INR: 1.58 ratio         PTT - ( 04 Jul 2022 04:55 )  PTT:31.3 sec    ET Tube ET Tube   Moderate Moraxella catarrhalis "Susceptibilities not performed"  Normal Respiratory Janiya present   Few Squamous epithelial cells per low power field  Few polymorphonuclear leukocytes per low power field  Few Gram positive cocci in pairs per oil power field 07-02 @ 09:30  .Blood Blood-Peripheral   No growth to date. -- 07-02 @ 09:20  .Blood Blood-Peripheral   No growth to date. -- 07-02 @ 09:15  Clean Catch Clean Catch (Midstream)   <10,000 CFU/mL Normal Urogenital Janiya -- 06-30 @ 10:00  .Blood Blood-Peripheral   No growth to date. -- 06-30 @ 09:15  .Blood Blood-Peripheral   No growth to date. -- 06-30 @ 09:05    Radiology:     < from: Xray Chest 1 View-PORTABLE IMMEDIATE (Xray Chest 1 View-PORTABLE IMMEDIATE .) (07.02.22 @ 00:16) >  ACC: [id number] EXAM:  XR CHEST PORTABLE IMMED 1V                          PROCEDURE DATE:  07/02/2022          INTERPRETATION:  INDICATION: Rales    COMPARISON: 6/30/22 plain chest; 6/30/22 CT scan of the chest.    Technique: AP radiograph the chest    FINDINGS:  Left-sided pacemaker redemonstrated - with right atrial, right   ventricular and epicardial leads.  Heart/Vascular: Difficult to assess heart size on this portable film.  Pulmonary: Persistent bilateral perihilar infiltrates (improved on the   left and stable on the right) likely due to pulmonary vascular congestion   - but inflammation/infection not excluded. Small bilateral pleural   effusions redemonstrated.  Bones: No acute bony finding.    Impression:  Airspace opacities slightly improved on the left and stable on the right.  Findings may be due to pulmonary edema but underlying   infection/inflammation not excluded.      --- End of Report ---        ESTHER ÁLVAREZ MD; Attending Radiologist  This document has been electronically signed. Jul  3 2022  2:27PM    < end of copied text >        CENTRAL LINE: Y  PETERSON: Y  A-LINE: N    GLOBAL ISSUE/BEST PRACTICE:  Analgesia: Y  Sedation: Y  CAM-ICU: N/A  HOB elevation: Y  Stress ulcer prophylaxis: Y  VTE prophylaxis: Y  Glycemic control: Y  Nutrition: Y    CODE STATUS: FULL CODE    CRITICAL CARE TIME SPENT: 40 mins  (Assessing presenting problems of acute illness, which pose high probability of life threatening deterioration or end organ damage/dysfunction, as well as medical decision making including initiating plan of care, reviewing data, reviewing radiologic exams, discussing with multidisciplinary team,  discussing goals of care with patient/family, and writing this note.  Non-inclusive of procedures performed)

## 2022-07-04 NOTE — PROGRESS NOTE ADULT - SUBJECTIVE AND OBJECTIVE BOX
Patient is a 77y old  Male who presents with a chief complaint of Substernal chest pain since last night (04 Jul 2022 14:57)      INTERVAL HPI/OVERNIGHT EVENTS:    remains intubated and sedated on ventilator    MEDICATIONS  (STANDING):  aspirin  chewable 81 milliGRAM(s) Oral daily  buMETAnide Injectable 2 milliGRAM(s) IV Push daily  cefTRIAXone   IVPB      cefTRIAXone   IVPB 1000 milliGRAM(s) IV Intermittent every 24 hours  chlorhexidine 0.12% Liquid 15 milliLiter(s) Oral Mucosa every 12 hours  chlorhexidine 2% Cloths 1 Application(s) Topical <User Schedule>  dexMEDEtomidine Infusion 0.2 MICROgram(s)/kG/Hr (4.15 mL/Hr) IV Continuous <Continuous>  dextrose 5%. 1000 milliLiter(s) (100 mL/Hr) IV Continuous <Continuous>  dextrose 5%. 1000 milliLiter(s) (50 mL/Hr) IV Continuous <Continuous>  dextrose 50% Injectable 25 Gram(s) IV Push once  dextrose 50% Injectable 12.5 Gram(s) IV Push once  dextrose 50% Injectable 25 Gram(s) IV Push once  DOBUTamine Infusion 5 MICROgram(s)/kG/Min (12.5 mL/Hr) IV Continuous <Continuous>  glucagon  Injectable 1 milliGRAM(s) IntraMuscular once  heparin   Injectable 5000 Unit(s) SubCutaneous every 12 hours  insulin glargine Injectable (LANTUS) 20 Unit(s) SubCutaneous two times a day  insulin lispro (ADMELOG) corrective regimen sliding scale   SubCutaneous every 6 hours  norepinephrine Infusion 0.05 MICROgram(s)/kG/Min (7.78 mL/Hr) IV Continuous <Continuous>  pantoprazole  Injectable 40 milliGRAM(s) IV Push daily  propofol Infusion 10 MICROgram(s)/kG/Min (4.98 mL/Hr) IV Continuous <Continuous>  ticagrelor 90 milliGRAM(s) Oral every 12 hours      MEDICATIONS  (PRN):  dextrose Oral Gel 15 Gram(s) Oral once PRN Blood Glucose LESS THAN 70 milliGRAM(s)/deciliter      Allergies    No Known Allergies    Intolerances        PAST MEDICAL & SURGICAL HISTORY:  Lung cancer, upper lobe, left      Heart attack  1990,2005      Stented coronary artery  six cardiac stents      Sleep apnea  uses c pap at home      HTN (hypertension)      High cholesterol      Diabetes  on metformin, humalog insulin pump      Insulin pump in place      Hiatal hernia      Vertigo      Stented coronary artery  Drug eluding x 6 stents      Type 2 diabetes mellitus      TIA (transient ischemic attack)  Jan 2014      Chronic back pain      Spinal stenosis in cervical region      Spinal stenosis of lumbar region      Lumbar herniated disc      OA (osteoarthritis)      CHF (congestive heart failure)      Cellulitis      S/P partial lobectomy of lung  lobectomy of left upper lobe. April 2014      History of throat surgery  1996      S/P angioplasty with stent  2005 and 2006          Vital Signs Last 24 Hrs  T(C): 38.6 (04 Jul 2022 12:23), Max: 39.3 (03 Jul 2022 20:30)  T(F): 101.5 (04 Jul 2022 12:23), Max: 102.8 (03 Jul 2022 20:30)  HR: 93 (04 Jul 2022 15:30) (73 - 93)  BP: 100/56 (04 Jul 2022 15:30) (87/51 - 123/58)  BP(mean): 72 (04 Jul 2022 15:30) (66 - 87)  RR: 33 (04 Jul 2022 15:30) (0 - 34)  SpO2: 96% (04 Jul 2022 15:30) (94% - 98%)    PHYSICAL EXAMINATION:    GENERAL: The patient is intubated and sedated and in no apparent distress.     HEENT: Head is normocephalic and atraumatic.    NECK: no JVD    LUNGS: bilateral coarse rhonchi    HEART: Regular rate and rhythm without murmur.    ABDOMEN: Soft, nontender, and nondistended.      EXTREMITIES: bilateral pedal edema    NEUROLOGIC: no focal findings    SKIN: No ulceration or induration present.      LABS:                        12.4   10.72 )-----------( 225      ( 04 Jul 2022 04:55 )             40.7     07-04    147<H>  |  106  |  64<H>  ----------------------------<  405<H>  3.5   |  32<H>  |  1.90<H>    Ca    8.9      04 Jul 2022 04:55  Phos  2.4     07-04  Mg     2.6     07-04    TPro  6.9  /  Alb  2.8<L>  /  TBili  2.8<H>  /  DBili  x   /  AST  1741<H>  /  ALT  >3510<H>  /  AlkPhos  186<H>  07-04    PT/INR - ( 04 Jul 2022 04:55 )   PT: 18.6 sec;   INR: 1.58 ratio         PTT - ( 04 Jul 2022 04:55 )  PTT:31.3 sec    ABG - ( 04 Jul 2022 04:27 )  pH, Arterial: 7.51  pH, Blood: x     /  pCO2: 43    /  pO2: 131   / HCO3: 33    / Base Excess: 10.8  /  SaO2: 99.1                    Serum Pro-Brain Natriuretic Peptide: 33035 pg/mL (07-02-22 @ 00:36)    Lactate, Blood: 3.2 mmol/L (07-04-22 @ 04:55)        MICROBIOLOGY:  Culture Results:   Moderate Moraxella catarrhalis  Normal Respiratory Janiya present (07-02 @ 09:30)  Culture Results:   No growth to date. (07-02 @ 09:20)  Culture Results:   No growth to date. (07-02 @ 09:15)          Assessment:    Acute on Chronic Hypoxic respiratory failure  Cardiogenic Shock  Coronary artery disease  Sepsis  Acute on Chronic Systolic congestive heart failure    Plan:    Mechanical ventilatory support  IV pressors  Sedation as needed  Cardiac monitoring  IV antibiotics  Poor Prognosis  Discussed with family at bedside  Palliative evaluation in future      Plan:

## 2022-07-04 NOTE — PROGRESS NOTE ADULT - ASSESSMENT
Critically ill 77 year old male with CAD s/p multiple PCI, chronic systolic heart failure s/p ICD here with chest pain and dyspnea. Found to have acute on chronic systolic heart failure exacerbation / NSTEMI c/b cardiogenic shock. He has an acute kidney injury, lactic acidosis, shock liver. Intubated for acute hypoxemic respiratory. Remains intubated, on vasopressor support.    NEURO: Continue propofol, precedex  CVS: Remains on norepinephrine. Start dobutamine. Continue ASA, Brilinta. Statin on hold.  PULM: Lung protective ventilation. PST as tolerates.  GI: Enteral feeds. Trend LFTs.  RENAL: Monitor UOP, Cr. Reduced Bumex to 2 QD.   ENDO: Continue insulin regimen. Adjust as needed.  ID: Ceftriaxone for pneumonia coverage. Growing moraxella in sputum.  PPX: HSQ for DVT PPX    Discussed GOC with patient's wife and son at bedside. They will discuss code status amongst themselves. Will revisit.    Prognosis guarded

## 2022-07-04 NOTE — PROGRESS NOTE ADULT - SUBJECTIVE AND OBJECTIVE BOX
Chief Complaint: Chest pain, shortness of breath    Interval Events: Started on dobutamine overnight.    Review of Systems:  General: No fevers, chills, weight gain  Skin: No rashes, color changes  Cardiovascular: No chest pain, orthopnea  Respiratory: +shortness of breath, cough  Gastrointestinal: No nausea, abdominal pain  Genitourinary: No incontinence, pain with urination  Musculoskeletal: No pain, swelling, decreased range of motion  Neurological: No headache, weakness  Psychiatric: No depression, anxiety  Endocrine: No weight gain, increased thirst  All other systems are comprehensively negative.    Physical Exam:  Vital Signs Last 24 Hrs  T(C): 36.4 (04 Jul 2022 08:40), Max: 39.3 (03 Jul 2022 20:30)  T(F): 97.6 (04 Jul 2022 08:40), Max: 102.8 (03 Jul 2022 20:30)  HR: 74 (04 Jul 2022 09:30) (71 - 86)  BP: 113/60 (04 Jul 2022 09:30) (86/53 - 146/64)  BP(mean): 80 (04 Jul 2022 09:30) (65 - 87)  RR: 30 (04 Jul 2022 09:30) (0 - 34)  SpO2: 97% (04 Jul 2022 09:30) (94% - 98%)  General: Sedated  HEENT: Intubated  Neck: No JVD, no carotid bruit  Lungs: Coarse bilaterally  CV: RRR, nl S1/S2, no M/R/G  Abdomen: S/NT/ND, +BS  Extremities: 1+ LE edema, no cyanosis  Neuro: AAOx0  Skin: No rash    Labs:    07-04    147<H>  |  106  |  64<H>  ----------------------------<  405<H>  3.5   |  32<H>  |  1.90<H>    Ca    8.9      04 Jul 2022 04:55  Phos  2.4     07-04  Mg     2.6     07-04    TPro  6.9  /  Alb  2.8<L>  /  TBili  2.8<H>  /  DBili  x   /  AST  1741<H>  /  ALT  >3510<H>  /  AlkPhos  186<H>  07-04                        12.4   10.72 )-----------( 225      ( 04 Jul 2022 04:55 )             40.7       Telemetry: Ventricular paced Chief Complaint: Chest pain, shortness of breath    Interval Events: Started on dobutamine overnight.    Review of Systems:  Unable to obtain    Physical Exam:  Vital Signs Last 24 Hrs  T(C): 36.4 (04 Jul 2022 08:40), Max: 39.3 (03 Jul 2022 20:30)  T(F): 97.6 (04 Jul 2022 08:40), Max: 102.8 (03 Jul 2022 20:30)  HR: 74 (04 Jul 2022 09:30) (71 - 86)  BP: 113/60 (04 Jul 2022 09:30) (86/53 - 146/64)  BP(mean): 80 (04 Jul 2022 09:30) (65 - 87)  RR: 30 (04 Jul 2022 09:30) (0 - 34)  SpO2: 97% (04 Jul 2022 09:30) (94% - 98%)  General: Sedated  HEENT: Intubated  Neck: No JVD, no carotid bruit  Lungs: Coarse bilaterally  CV: RRR, nl S1/S2, no M/R/G  Abdomen: S/NT/ND, +BS  Extremities: 1+ LE edema, no cyanosis  Neuro: AAOx0  Skin: No rash    Labs:    07-04    147<H>  |  106  |  64<H>  ----------------------------<  405<H>  3.5   |  32<H>  |  1.90<H>    Ca    8.9      04 Jul 2022 04:55  Phos  2.4     07-04  Mg     2.6     07-04    TPro  6.9  /  Alb  2.8<L>  /  TBili  2.8<H>  /  DBili  x   /  AST  1741<H>  /  ALT  >3510<H>  /  AlkPhos  186<H>  07-04                        12.4   10.72 )-----------( 225      ( 04 Jul 2022 04:55 )             40.7       Telemetry: Ventricular paced

## 2022-07-04 NOTE — PROGRESS NOTE ADULT - ASSESSMENT
The patient is a 77 year old male with a history of DM, CAD with prior MI s/p multiple PCI, chronic systolic heart failure s/p CRT-D who presents with chest pain and shortness of breath in the setting of acute systolic heart failure, cardiogenic shock, possible NSTEMI.    Plan:  - The patient has a history of multiple PCI including intervention on ISR and is not a candidate for CABG or future PCI  - Troponin peaked at 1360 in the setting of possible NSTEMI vs. heart failure/shock. No need to trend.  - Echo with severely reduced LV systolic function  - Continue aspirin 81 mg daily  - Continue ticagrelor 90 mg bid  - Completed heparin drip for medical management of NSTEMI  - Hold Entresto, spironolactone, carvedilol due to hypotension/shock  - Continue bumetanide 2 mg IV q12h  - Continue norepinephrine and wean off as tolerated  - Started on dobutamine, low rate. Can increase if needed to 2.5 mcg/kg/min (if evidence of organ dysfunction worsening).  - The patient is not a candidate for advanced heart failure therapies  - [facility] discussions  - ICU care      32 minutes of critical care time spent with the patient and coordinating care with nursing, hospitalist, and consultants. Patient is critically ill requiring ICU care. The patient is high risk for deterioration and death. The patient is a 77 year old male with a history of DM, CAD with prior MI s/p multiple PCI, chronic systolic heart failure s/p CRT-D who presents with chest pain and shortness of breath in the setting of acute systolic heart failure, cardiogenic shock, possible NSTEMI.    Plan:  - Febrile overnight - on ceftriaxone  - Evidence of cardiogenic and septic shock  - The patient has a history of multiple PCI including intervention on ISR and is not a candidate for CABG or future PCI  - Troponin peaked at 1360 in the setting of possible NSTEMI vs. heart failure/shock. No need to trend.  - Echo with severely reduced LV systolic function  - Continue aspirin 81 mg daily  - Continue ticagrelor 90 mg bid  - Completed heparin drip for medical management of NSTEMI  - Hold Entresto, spironolactone, carvedilol due to hypotension/shock  - Continue bumetanide 2 mg IV q12h  - Continue norepinephrine and wean off as tolerated  - Started on dobutamine, low rate. Can increase if needed to 2.5 mcg/kg/min (if evidence of organ dysfunction worsening).  - The patient is not a candidate for advanced heart failure therapies  - [facility] discussions  - ICU care      32 minutes of critical care time spent with the patient and coordinating care with nursing, hospitalist, and consultants. Patient is critically ill requiring ICU care. The patient is high risk for deterioration and death.

## 2022-07-04 NOTE — PROGRESS NOTE ADULT - SUBJECTIVE AND OBJECTIVE BOX
Interval Events:  Remains intubated, on vasopressor support  Started on low dose dobutamine    REVIEW OF SYSTEMS:  [ ] All other systems negative  [x] Unable to assess ROS because patient is intubated, sedated, critically ill    OBJECTIVE:  ICU Vital Signs Last 24 Hrs  T(C): 38.6 (04 Jul 2022 12:23), Max: 39.3 (03 Jul 2022 20:30)  T(F): 101.5 (04 Jul 2022 12:23), Max: 102.8 (03 Jul 2022 20:30)  HR: 82 (04 Jul 2022 14:00) (73 - 86)  BP: 103/56 (04 Jul 2022 14:00) (87/51 - 123/58)  BP(mean): 74 (04 Jul 2022 14:00) (66 - 87)  ABP: --  ABP(mean): --  RR: 26 (04 Jul 2022 14:00) (0 - 34)  SpO2: 97% (04 Jul 2022 14:00) (94% - 98%)    Mode: AC/ CMV (Assist Control/ Continuous Mandatory Ventilation), RR (machine): 22, TV (machine): 450, FiO2: 40, PEEP: 5, ITime: 1, MAP: 11, PIP: 20    07-03 @ 07:01 - 07-04 @ 07:00  --------------------------------------------------------  IN: 2532.6 mL / OUT: 5380 mL / NET: -2847.4 mL    07-04 @ 07:01 - 07-04 @ 14:57  --------------------------------------------------------  IN: 510.7 mL / OUT: 0 mL / NET: 510.7 mL      CAPILLARY BLOOD GLUCOSE      POCT Blood Glucose.: 360 mg/dL (04 Jul 2022 12:23)      PHYSICAL EXAM:  General: NAD  HEENT: NC/AT  Neck: Supple  Respiratory: Clear BS anteriorly, decreased at bases. No wheezing.  Cardiovascular: RRR. No murmur. 1+ LE edema.   Abdomen: Soft, NT/ND  Extremities: Cool  Skin: Intact  Neurological: Sedated. PERRL. Following commands (e.g. nodding head, squeeze hands).      HOSPITAL MEDICATIONS:  aspirin  chewable 81 milliGRAM(s) Oral daily  heparin   Injectable 5000 Unit(s) SubCutaneous every 12 hours  ticagrelor 90 milliGRAM(s) Oral every 12 hours    cefTRIAXone   IVPB      cefTRIAXone   IVPB 1000 milliGRAM(s) IV Intermittent every 24 hours    buMETAnide Injectable 2 milliGRAM(s) IV Push daily  DOBUTamine Infusion 5 MICROgram(s)/kG/Min IV Continuous <Continuous>  norepinephrine Infusion 0.05 MICROgram(s)/kG/Min IV Continuous <Continuous>    dextrose 50% Injectable 25 Gram(s) IV Push once  dextrose 50% Injectable 12.5 Gram(s) IV Push once  dextrose 50% Injectable 25 Gram(s) IV Push once  dextrose Oral Gel 15 Gram(s) Oral once PRN  glucagon  Injectable 1 milliGRAM(s) IntraMuscular once  insulin glargine Injectable (LANTUS) 20 Unit(s) SubCutaneous two times a day  insulin lispro (ADMELOG) corrective regimen sliding scale   SubCutaneous every 6 hours      dexMEDEtomidine Infusion 0.2 MICROgram(s)/kG/Hr IV Continuous <Continuous>  propofol Infusion 10 MICROgram(s)/kG/Min IV Continuous <Continuous>    pantoprazole  Injectable 40 milliGRAM(s) IV Push daily        dextrose 5%. 1000 milliLiter(s) IV Continuous <Continuous>  dextrose 5%. 1000 milliLiter(s) IV Continuous <Continuous>      chlorhexidine 0.12% Liquid 15 milliLiter(s) Oral Mucosa every 12 hours  chlorhexidine 2% Cloths 1 Application(s) Topical <User Schedule>        LABS:                        12.4   10.72 )-----------( 225      ( 04 Jul 2022 04:55 )             40.7     Hgb Trend: 12.4<--, 11.6<--, 11.5<--, 11.9<--, 11.5<--  07-04    147<H>  |  106  |  64<H>  ----------------------------<  405<H>  3.5   |  32<H>  |  1.90<H>    Ca    8.9      04 Jul 2022 04:55  Phos  2.4     07-04  Mg     2.6     07-04    TPro  6.9  /  Alb  2.8<L>  /  TBili  2.8<H>  /  DBili  x   /  AST  1741<H>  /  ALT  >3510<H>  /  AlkPhos  186<H>  07-04    Creatinine Trend: 1.90<--, 1.80<--, 2.00<--, 2.30<--, 2.40<--, 2.60<--  PT/INR - ( 04 Jul 2022 04:55 )   PT: 18.6 sec;   INR: 1.58 ratio         PTT - ( 04 Jul 2022 04:55 )  PTT:31.3 sec    Arterial Blood Gas:  07-04 @ 04:27  7.51/43/131/33/99.1/10.8  ABG lactate: --  Arterial Blood Gas:  07-03 @ 08:52  7.46/38/126/27/99.3/3.2  ABG lactate: --

## 2022-07-05 NOTE — CONSULT NOTE ADULT - REASON FOR ADMISSION
Substernal chest pain since last night

## 2022-07-05 NOTE — CONSULT NOTE ADULT - CONSULT REQUESTED DATE/TIME
01-Jul-2022 11:32
05-Jul-2022 12:21
30-Jun-2022 13:20
30-Jun-2022 16:44
30-Jun-2022 11:29
02-Jul-2022 06:36

## 2022-07-05 NOTE — CONSULT NOTE ADULT - SUBJECTIVE AND OBJECTIVE BOX
[facility]  INFECTIOUS DISEASES   [address] [place] [address]  Tel: [phone]     Fax: [phone]  ======================================================  MD Thao Moore Kaushal, MD Cho, Michelle, MD   ======================================================    [id number]  BEKAH AMOR     CC: Substernal chest pain     HPI:  78 yo man with PMH of HTN, DM2, Lung cancer, CHF, CAD, PAPO, and chronic back pain was admitted with chest discomfort on 6/30. Pain was same as his past MIs.   On 7/1 early morning was intubated and had a very high lactate and troponin. On 7/2 was started on ceftriaxone for possible pneumonia, and ET aspirate was sent for culture  that is now back Moraxella and normal respiratory santana. He has been on ceftriaxone since then, ID has been called for antibiotics management and fever.     PAST MEDICAL & SURGICAL HISTORY:  Lung cancer, upper lobe, left  Heart attack  1990,2005  Stented coronary artery  six cardiac stents  Sleep apnea  uses c pap at home  HTN (hypertension)  High cholesterol  Diabetes  on metformin, humalog insulin pump  Insulin pump in place  Hiatal hernia  Vertigo  Stented coronary artery  Drug eluding x 6 stents  Type 2 diabetes mellitus  TIA (transient ischemic attack)  Jan 2014  Chronic back pain  Spinal stenosis in cervical region  Spinal stenosis of lumbar region  Lumbar herniated disc  OA (osteoarthritis)  CHF (congestive heart failure)  Cellulitis  S/P partial lobectomy of lung  lobectomy of left upper lobe. April 2014  History of throat surgery  1996  S/P angioplasty with stent  2005 and 2006    Social Hx: No smoking, ETOH or drugs     FAMILY HISTORY:  No pertinent family history in first degree relatives    Allergies  No Known Allergies    Antibiotics:  MEDICATIONS  (STANDING):  aspirin  chewable 81 milliGRAM(s) Oral daily  cefTRIAXone   IVPB      cefTRIAXone   IVPB 1000 milliGRAM(s) IV Intermittent every 24 hours  chlorhexidine 0.12% Liquid 15 milliLiter(s) Oral Mucosa every 12 hours  chlorhexidine 2% Cloths 1 Application(s) Topical <User Schedule>  dexMEDEtomidine Infusion 0.2 MICROgram(s)/kG/Hr (4.15 mL/Hr) IV Continuous <Continuous>  dextrose 5%. 1000 milliLiter(s) (50 mL/Hr) IV Continuous <Continuous>  dextrose 5%. 1000 milliLiter(s) (100 mL/Hr) IV Continuous <Continuous>  dextrose 50% Injectable 12.5 Gram(s) IV Push once  dextrose 50% Injectable 25 Gram(s) IV Push once  dextrose 50% Injectable 25 Gram(s) IV Push once  DOBUTamine Infusion 5 MICROgram(s)/kG/Min (12.5 mL/Hr) IV Continuous <Continuous>  glucagon  Injectable 1 milliGRAM(s) IntraMuscular once  heparin   Injectable 5000 Unit(s) SubCutaneous every 12 hours  insulin regular Infusion 7 Unit(s)/Hr (7 mL/Hr) IV Continuous <Continuous>  norepinephrine Infusion 0.05 MICROgram(s)/kG/Min (7.78 mL/Hr) IV Continuous <Continuous>  pantoprazole  Injectable 40 milliGRAM(s) IV Push daily  potassium chloride  20 mEq/100 mL IVPB 20 milliEquivalent(s) IV Intermittent every 2 hours  propofol Infusion 10 MICROgram(s)/kG/Min (4.98 mL/Hr) IV Continuous <Continuous>  ticagrelor 90 milliGRAM(s) Oral every 12 hours     REVIEW OF SYSTEMS:  Intubated     Physical Exam:  Vital Signs Last 24 Hrs  T(C): 36.8 (05 Jul 2022 12:12), Max: 39.3 (04 Jul 2022 23:47)  T(F): 98.3 (05 Jul 2022 12:12), Max: 102.7 (04 Jul 2022 23:47)  HR: 99 (05 Jul 2022 12:03) (78 - 120)  BP: 109/67 (05 Jul 2022 12:00) (83/52 - 194/94)  BP(mean): 82 (05 Jul 2022 12:00) (63 - 121)  RR: 27 (05 Jul 2022 12:00) (22 - 43)  SpO2: 99% (05 Jul 2022 12:03) (96% - 100%)  GEN: Intubated, NG tube +   HEENT: normocephalic and atraumatic.   NECK: Supple.  No lymphadenopathy   LUNGS: Clear to auscultation. R IJ looks fine   HEART: Regular rate and rhythm without murmur.  ABDOMEN: Soft, nontender, and nondistended.  Positive bowel sounds.    : No CVA tenderness  EXTREMITIES: Without any cyanosis, clubbing, rash, lesions or edema.  NEUROLOGIC: unable   PSYCHIATRIC: sedated   SKIN: No rash or ulcer     Labs:  07-05    154<H>  |  114<H>  |  82<H>  ----------------------------<  461<HH>  3.1<L>   |  33<H>  |  2.00<H>    Ca    9.3      05 Jul 2022 06:34  Phos  2.6     07-05  Mg     2.9     07-05    TPro  7.2  /  Alb  2.9<L>  /  TBili  1.7<H>  /  DBili  x   /  AST  880<H>  /  ALT  3121<H>  /  AlkPhos  189<H>  07-05                        12.5   13.06 )-----------( 228      ( 05 Jul 2022 06:34 )             42.0     PT/INR - ( 04 Jul 2022 04:55 )   PT: 18.6 sec;   INR: 1.58 ratio    PTT - ( 04 Jul 2022 04:55 )  PTT:31.3 sec    LIVER FUNCTIONS - ( 05 Jul 2022 06:34 )  Alb: 2.9 g/dL / Pro: 7.2 g/dL / ALK PHOS: 189 U/L / ALT: 3121 U/L / AST: 880 U/L / GGT: x           ABG - ( 04 Jul 2022 04:27 )  pH, Arterial: 7.51  pH, Blood: x     /  pCO2: 43    /  pO2: 131   / HCO3: 33    / Base Excess: 10.8  /  SaO2: 99.1     Procalcitonin, Serum: 0.11 ng/mL (06-30-22 @ 09:20)    SARS-CoV-2 Result: NotDetec (06-30-22 @ 09:20)    RECENT CULTURES:  07-02 @ 09:30 ET Tube ET Tube     Moderate Moraxella catarrhalis "Susceptibilities not performed"  Normal Respiratory Santana present    Few Squamous epithelial cells per low power field  Few polymorphonuclear leukocytes per low power field  Few Gram positive cocci in pairs per oil power field    07-02 @ 09:20 .Blood Blood-Peripheral     No growth to date.    07-02 @ 09:15 .Blood Blood-Peripheral     No growth to date.    06-30 @ 10:00 Clean Catch Clean Catch (Midstream)     <10,000 CFU/mL Normal Urogenital Santana    06-30 @ 09:15 .Blood Blood-Peripheral     No Growth Final    06-30 @ 09:05 .Blood Blood-Peripheral     No Growth Final    All imaging and other data have been reviewed.  < from: Xray Chest 1 View-PORTABLE IMMEDIATE (Xray Chest 1 View-PORTABLE IMMEDIATE .) (07.02.22 @ 00:16) >  Impression:  Airspace opacities slightly improved on the left and stable on the right.  Findings may be due to pulmonary edema but underlying   infection/inflammation not excluded.    < from: CT Chest No Cont (06.30.22 @ 12:16) >  IMPRESSION:  Interstitial edema and bilateral pleural effusions suggest an element of   CHF/fluid overload.  Airspace disease throughout right lung may represent pulmonary edema.   Inflammation/infection not excluded.    Assessment and Plan:   78 yo man with PMH of HTN, DM2, Lung cancer, CHF, CAD, PAPO, and chronic back pain was admitted with chest discomfort on 6/30. Pain was same as his past MIs.   On 7/1 early morning was intubated and had a very high lactate and troponin. On 7/2 was started on ceftriaxone for possible pneumonia, and ET aspirate was sent for culture  that is now back Moraxella and normal respiratory santana.  Chest CT is more consistent with edema rather than pneumonia but since has fever will continue on antibiotics.   No other source of infection at this time but will need a full fever work up. Multiorgan failure   MRSA PCR negative so MRSA pneumonia less likely.  With shock could have ischemia in organs including bowel, had a very high lactate.     Recommendations:   - Blood culture x2   - UA and UC negative   - ET tube culture Moraxella  - Fever and leukocytosis could be reactional to shock and MI, will trend Tmax and WBC  - High creat and transaminitis, will trend   - Stop ceftriaxone and start Zosyn   - Watch lines, Has a central line in R IJ looks fine for now  - Based on cultures will modify treatment    Thank you for courtesy of this consult.     Will follow.  Discussed with the primary team.     Carlos Hidalgo MD  Division of Infectious Diseases   Please call ID service at [phone] with any question.      55 minutes spent on total encounter assessing patient, examination, chart reivew, counseling and coordinating care by the attending physician/nurse/care manager.

## 2022-07-05 NOTE — PROGRESS NOTE ADULT - ASSESSMENT
77M significant PMH CAD s/p MEL x 6 (most recent MI 2 mo ago), HFrEF, BiV-ICD, chronic back pain, DM2 on metformin, Hiatal hernia, HLD, HTN, Lung CA, and PAPO originally a/w NSTEMI with cardiogenic shock component and downgraded.  Now return to ICU for ADHF, Cardiogenic shock, acute hypoxemic RF 2/2 cardiogenic pulmonary edema, worsening JIMENEZ and transaminitis.    Neuro:   - Sedated on Propofol, will wean down and started on Precedex   - Continue Fentanyl to facilitate mechanical ventilation; titrate down as clinically appropriate    CV:   - ADHF and Cardiogenic shock  - Wean down on Levophed  - Pt may need a trial of inotrope (Dobutamine)   - BNP 32336  - Start Bumex 2 mg IVP BID for diuresis   - Continue heparin gtt for NSTEMI, will STOP at bedtime  - Continue ASA and Brilinta  - Trop: 1071.8-->1740.6-->1573, no need to further trend as trop has peaked    - TTE performed, f/u official read   - Off of statin given shock liver, may restart when appropriate      Resp:   - Acute hypoxemic respiratory failure 2/2 cardiogenic pulm edema  - Continue lung protective ventilation AC/CMV: 26/450/5/50  - CXR 7/2 with RUL consolidation as per personal read, f/u official read   - Aggressive chest PT and suctioning    GI:   - NPO with TF  - Shock liver, transaminitis improving, will continue to trend   - Off of statin given shock liver, may restart when appropriate      Renal:  - JIMENEZ likely pre renal, improving HAGMA    - Monitor Cr, improving    - Start Bumex 2 mg IVP BID for diuresis   - Strict I&Os    ID:   - lactate downtrending, 3.6 today, f/u repeat lactate   - Continue Rocephin  - 6/30 BCx x2 negative  - F/u repeat BCx x2  - Sputum gram stain with GPC, f/u culture result  - MRSA negative   - Monitor WBC and fever     Heme:   - Continue heparin gtt for NSTEMI, will STOP at bedtime  - C/w ASA/brilinta    Endo:  - DMT2 on ISS  - Will increase from Lantus 26u qhs to Lantus 32u qhs   - Goal -180    Skin:   - Bryant  - RIJ central line     Dispo:  Critically ill requiring ICU level of care.      Wife updated at bedside.  77M significant PMH CAD s/p MEL x 6 (most recent MI 2 mo ago), HFrEF, BiV-ICD, chronic back pain, DM2 on metformin, Hiatal hernia, HLD, HTN, Lung CA, and PAPO originally a/w NSTEMI with cardiogenic shock component and downgraded.  Now return to ICU for ADHF, Cardiogenic shock, acute hypoxemic RF 2/2 cardiogenic pulmonary edema, worsening JIMENEZ and transaminitis.    Neuro:   - Sedated on Propofol and Precedex    CV:   - ADHF and Cardiogenic shock  - Continue Levophed  - Started on Dobutamine yesterday, will continue  - BNP 81085  - Discontinue Bumex and diurese PRN  - Continue ASA and Brilinta  - Trop: 1071.8-->1740.6-->1573, no need to further trend as trop has peaked    - TTE: severe reduced LV systolic function, EF:30%, increase LV wall thickness, mild pulmonary HTN  - Off of statin given shock liver, may restart when appropriate      Resp:   - Acute hypoxemic respiratory failure 2/2 cardiogenic pulm edema  - Continue lung protective ventilation AC/CMV: 22/450/5/40  - CXR 7/2: Airspace opacities slightly improved on the left and stable on the right.May be due to pulmonary edema vs underlying infection/inflammation   - Aggressive chest PT and suctioning  - F/u repeat CXR    GI:   - NPO with TF  - Shock liver, transaminitis improving, will continue to trend   - Off of statin given shock liver, may restart when appropriate      Renal:  - JIMENEZ likely pre renal, improving HAGMA    - Monitor Cr, improving    - Hypernatremia today Na: 154  - Discontinue Bumex and diurese PRN  - Start free water 250 cc q6h  - Hypokalemia K: 3.1 today, repleted, f/u repeat labs 16:00    - BMP q6h while on insulin drip   - Strict I&Os    ID:   - lactate downtrending  - Continue Rocephin  - 6/30 BCx x2 negative  - 7/2 BCx x2 NGTD  - 7/2 Sputum cx with Moraxella f/u sensitivities   - MRSA negative   - ID Dr. Hidalgo consulted   - Monitor WBC and fever     Heme:   - C/w ASA/brilinta  - DVT PPX HSQ    Endo:  - DMT2 on ISS  - Hyperglycemia overnight, continue insulin drip and q1h FS   - BMP q6h while on insulin drip   - Goal -180    Skin:   - Bryant  - RIJ central line     Dispo:  Critically ill requiring ICU level of care.      Wife updated at bedside.

## 2022-07-05 NOTE — PROGRESS NOTE ADULT - ASSESSMENT
JIMENEZ on CKD 3  CHF  Shock on pressors  h/o Hypertension  Diabetes  Hypernatremia    Improving renal indices. Add free water for hypernatremia. Hold bumex if fluid status allows. Taper pressors as tolerated. Monitor blood sugar levels. Insulin coverage as needed.  Will follow electrolytes and renal function trend. Avoid nephrotoxic meds as possible. D/w ICU team.

## 2022-07-05 NOTE — PROGRESS NOTE ADULT - SUBJECTIVE AND OBJECTIVE BOX
Chief Complaint: Chest pain, shortness of breath    Interval Events: No significant changes overnight.    Review of Systems:  Unable to obtain    Physical Exam:  Vital Signs Last 24 Hrs  T(C): 36.7 (05 Jul 2022 07:41), Max: 39.3 (04 Jul 2022 23:47)  T(F): 98 (05 Jul 2022 07:41), Max: 102.7 (04 Jul 2022 23:47)  HR: 90 (05 Jul 2022 08:08) (73 - 120)  BP: 110/64 (05 Jul 2022 07:30) (83/52 - 194/94)  BP(mean): 82 (05 Jul 2022 07:30) (63 - 121)  RR: 24 (05 Jul 2022 07:30) (22 - 43)  SpO2: 100% (05 Jul 2022 08:08) (96% - 100%)  General: Sedated  HEENT: Intubated  Neck: No JVD, no carotid bruit  Lungs: Coarse bilaterally  CV: RRR, nl S1/S2, no M/R/G  Abdomen: S/NT/ND, +BS  Extremities: 1+ LE edema, no cyanosis  Neuro: AAOx0  Skin: No rash    Labs:    07-05    154<H>  |  114<H>  |  82<H>  ----------------------------<  461<HH>  3.1<L>   |  33<H>  |  2.00<H>    Ca    9.3      05 Jul 2022 06:34  Phos  2.6     07-05  Mg     2.9     07-05    TPro  7.2  /  Alb  2.9<L>  /  TBili  1.7<H>  /  DBili  x   /  AST  880<H>  /  ALT  3121<H>  /  AlkPhos  189<H>  07-05                        12.5   13.06 )-----------( 228      ( 05 Jul 2022 06:34 )             42.0         Telemetry: Ventricular paced

## 2022-07-05 NOTE — PROGRESS NOTE ADULT - SUBJECTIVE AND OBJECTIVE BOX
Patient is a 77y old  Male who presents with a chief complaint of Substernal chest pain since last night (05 Jul 2022 21:13)    BRIEF HOSPITAL COURSE:   76 y/o M w/ pmh of DM, hld, htn, lung ca, sleep apnea,  cad x6 stents, HFrEF, BiVICD, recent MI appx 2 m/o ago, presented to [facility] on 6/30/2022 for CP and SOB was admitted to the ICU for cardiogenic shock, JIMENEZ, lactic acidosis and ACS, pt was started on medical management w/ improvement and transferred to floor 07/01/2022, floor courser c/b RRT for acute agitation, tachypnea 2/2 to pna, acute pulm edema resulting in acute hypoxic resp failure thus was transferred back to the MICU requiring intubation now w/ decompensated HF, cardiogenic shock, NSTEMI, w/ JIMENEZ, shock liver and lactic acidosis.    Events last 24 hours:   -Remains in vasopressor-dependent shock state w/ Levophed infusion. Remains on inotropic support w/ Dobutamine infusion.   -Sedated w/ Propofol infusion.   -BS improved this evening on Insulin infusion, actively titrated 8u to 5u/h tonight.     PAST MEDICAL & SURGICAL HISTORY:  Lung cancer, upper lobe, left  Heart attack  1990,2005  Stented coronary artery  six cardiac stents  Sleep apnea  uses c pap at home  HTN (hypertension)  High cholesterol  Diabetes  on metformin, humalog insulin pump  Insulin pump in place  Hiatal hernia  Vertigo  Stented coronary artery  Drug eluding x 6 stents  Type 2 diabetes mellitus  TIA (transient ischemic attack)  Jan 2014  Chronic back pain  Spinal stenosis in cervical region  Spinal stenosis of lumbar region  Lumbar herniated disc  OA (osteoarthritis)  CHF (congestive heart failure)  Cellulitis  S/P partial lobectomy of lung  lobectomy of left upper lobe. April 2014  History of throat surgery  1996  S/P angioplasty with stent  2005 and 2006    Review of Systems:  Due to pt being intubated/sedated, subjective information was not able to be obtained from the patient. History was obtained, to the extent possible, from review of the chart and collateral sources of information.     Medications:  piperacillin/tazobactam IVPB.. 3.375 Gram(s) IV Intermittent every 8 hours  DOBUTamine Infusion 5 MICROgram(s)/kG/Min IV Continuous <Continuous>  norepinephrine Infusion 0.05 MICROgram(s)/kG/Min IV Continuous <Continuous>  dexMEDEtomidine Infusion 0.2 MICROgram(s)/kG/Hr IV Continuous <Continuous>  propofol Infusion 10 MICROgram(s)/kG/Min IV Continuous <Continuous>  aspirin  chewable 81 milliGRAM(s) Oral daily  heparin   Injectable 5000 Unit(s) SubCutaneous every 12 hours  ticagrelor 90 milliGRAM(s) Oral every 12 hours  pantoprazole  Injectable 40 milliGRAM(s) IV Push daily  dextrose 50% Injectable 12.5 Gram(s) IV Push once  dextrose 50% Injectable 25 Gram(s) IV Push once  dextrose 50% Injectable 25 Gram(s) IV Push once  dextrose Oral Gel 15 Gram(s) Oral once PRN  glucagon  Injectable 1 milliGRAM(s) IntraMuscular once  insulin regular Infusion 7 Unit(s)/Hr IV Continuous <Continuous>  dextrose 5%. 1000 milliLiter(s) IV Continuous <Continuous>  dextrose 5%. 1000 milliLiter(s) IV Continuous <Continuous>  chlorhexidine 0.12% Liquid 15 milliLiter(s) Oral Mucosa every 12 hours  chlorhexidine 2% Cloths 1 Application(s) Topical <User Schedule>    Mode: AC/ CMV (Assist Control/ Continuous Mandatory Ventilation)  RR (machine): 22  TV (machine): 450  FiO2: 40  PEEP: 5  ITime: 1  MAP: 14  PIP: 28    ICU Vital Signs Last 24 Hrs  T(C): 38.9 (05 Jul 2022 21:20), Max: 39.3 (04 Jul 2022 23:47)  T(F): 102.1 (05 Jul 2022 21:20), Max: 102.7 (04 Jul 2022 23:47)  HR: 104 (05 Jul 2022 21:00) (90 - 120)  BP: 106/63 (05 Jul 2022 21:00) (83/52 - 127/72)  BP(mean): 79 (05 Jul 2022 21:00) (63 - 94)  ABP: --  ABP(mean): --  RR: 32 (05 Jul 2022 21:00) (22 - 41)  SpO2: 99% (05 Jul 2022 21:00) (97% - 100%)    ABG - ( 04 Jul 2022 04:27 )  pH, Arterial: 7.51  pH, Blood: x     /  pCO2: 43    /  pO2: 131   / HCO3: 33    / Base Excess: 10.8  /  SaO2: 99.1      I&O's Detail  04 Jul 2022 07:01  -  05 Jul 2022 07:00  --------------------------------------------------------  IN:    Dexmedetomidine: 26 mL    DOBUTamine: 18.6 mL    DOBUTamine: 212.5 mL    DOBUTamine: 5 mL    Enteral Tube Flush: 110 mL    Insulin: 3 mL    Insulin: 20 mL    Insulin: 7 mL    Insulin: 5 mL    Insulin: 21 mL    IV PiggyBack: 400 mL    IV PiggyBack: 50 mL    IV PiggyBack: 250 mL    Nepro with Carb Steady: 960 mL    Norepinephrine: 630.1 mL    Propofol: 182.5 mL  Total IN: 2900.7 mL  OUT:    Indwelling Catheter - Urethral (mL): 4600 mL  Total OUT: 4600 mL  Total NET: -1699.3 mL    05 Jul 2022 07:01  -  05 Jul 2022 21:37  --------------------------------------------------------  IN:    DOBUTamine: 150 mL    Enteral Tube Flush: 100 mL    Free Water: 500 mL    Insulin: 150 mL    IV PiggyBack: 50 mL    IV PiggyBack: 100 mL    IV PiggyBack: 300 mL    IV PiggyBack: 249.9 mL    Nepro with Carb Steady: 480 mL    Norepinephrine: 486 mL    Propofol: 150 mL  Total IN: 2715.9 mL  OUT:    Indwelling Catheter - Urethral (mL): 2000 mL  Total OUT: 2000 mL  Total NET: 715.9 mL    LABS:                   12.5   13.06 )-----------( 228      ( 05 Jul 2022 06:34 )             42.0     07-05  157<H>  |  116<H>  |  81<H>  ----------------------------<  265<H>  3.4<L>   |  35<H>  |  2.10<H>  Ca    9.0      05 Jul 2022 16:05  Phos  1.5     07-05  Mg     2.6     07-05  TPro  7.2  /  Alb  2.9<L>  /  TBili  1.7<H>  /  DBili  x   /  AST  880<H>  /  ALT  3121<H>  /  AlkPhos  189<H>  07-05    CAPILLARY BLOOD GLUCOSE  POCT Blood Glucose.: 161 mg/dL (05 Jul 2022 20:55)    PT/INR - ( 04 Jul 2022 04:55 )   PT: 18.6 sec;   INR: 1.58 ratio    PTT - ( 04 Jul 2022 04:55 )  PTT:31.3 sec    CULTURES:  Culture Results:   Moderate Moraxella catarrhalis "Susceptibilities not performed"  Normal Respiratory Janiya present (07-02-22 @ 09:30)  Culture Results:   No growth to date. (07-02-22 @ 09:20)  Culture Results:   No growth to date. (07-02-22 @ 09:15)  Culture Results:   <10,000 CFU/mL Normal Urogenital Janiya (06-30-22 @ 10:00)  Culture Results:   No Growth Final (06-30-22 @ 09:15)  Culture Results:   No Growth Final (06-30-22 @ 09:05)      Physical Examination:  General: +Intubated.   HEENT: PERRL.  PULM: Clear to auscultation bilaterally.  CVS: s1/s2.  ABD: Soft, nondistended, nontender, normoactive bowel sounds.  EXT: No edema, nontender.  SKIN: Warm.  NEURO: +Sedated.    RADIOLOGY:   < from: Xray Chest 1 View-PORTABLE IMMEDIATE (Xray Chest 1 View-PORTABLE IMMEDIATE .) (07.02.22 @ 00:16) >  ACC: [id number] EXAM:  XR CHEST PORTABLE IMMED 1V                        PROCEDURE DATE:  07/02/2022    Impression:  Airspace opacities slightly improved on the left and stable on the right.  Findings may be due to pulmonary edema but underlying   infection/inflammation not excluded.    76 y/o M w/ pmh of DM, hld, htn, lung ca, sleep apnea,  cad x6 stents, HFrEF, BiVICD, recent MI appx 2 m/o ago, presented to [facility] on 6/30/2022 for CP and SOB was admitted to the ICU for cardiogenic shock, JIMENEZ, lactic acidosis and ACS, pt was started on medical management w/ improvement and transferred to floor 07/01/2022, floor courser c/b RRT for acute agitation, tachypnea 2/2 to pna, acute pulm edema resulting in acute hypoxic resp failure thus was transferred back to the MICU requiring intubation now w/ decompensated HF, cardiogenic shock, NSTEMI, w/ JIMENEZ, shock liver and lactic acidosis.  Assessment:  1. Acute hypoxic respiratory failure  2. Shock, cardiogenic  3. Acute Pulmonary Edema  4. Decompensated HF  5. NSTEMI  6. Shock Liver  7. JIMENEZ  8. Lactic Acidosis  9. Hypernatremia  10. Hyperglycemia    Plan:  NEURO:  -Sedated w/ Propofol infusion, goal RASS 0 to -1.     CV:  -Remains in vasopressor-dependent shock state w/ Levophed infusion, actively titrating to maintain goal MAP >65.  -Dobutamine infusion for inotropic support.   -Keep K~4 and Mg>2 for optimal arrhythmia suppression.  -ASA/Brillinta. Statin being held in setting of shock liver. Cards following, not a candidate for CABG or future PCI. TTE w/ severe reduced LV systolic function, LVEF 30%.     RESP:  -Patient currently on Full vent support, FiO2 actively weaned to 30% this evening.   -titrate settings to maintain SaO2 >90%, or pH >7.25  -Low tidal volume ventilation strategy w/ goal Tv 4-6 cc/kg of ideal body weight  -plateu pressure goal <30  -Peridex oral care  -aggressive pulmonary toilet  -daily sedation vacation with spontaneous breathing trial if clinical condition warrants, discuss with respiratory therapy     RENAL:  -Renal function currently w/ JIMENEZ.   -trend lytes/Scr daily with BMP  -I's and O's, goal UOP 0.5 cc/kg/hr  -renal dose meds and avoid nephrotoxins   -Bumex stopped in setting of worsening hypernatremia. Free water flushes 250cc q6h.     GI:  -TF.  -Protonix IVP QD.  -Shock liver 2/2 congestive hepatopathy in setting of cardiogenic shock, improving. Trend pulse liver function QD.     ENDO:  -Aggressive glycemic control to limit FS glucose to <180mg/dl. BS improved this evening on Insulin infusion, actively titrated to 5u/h this evening. Q1h accuchecks.     ID:  -Febrile, on beir hugger (holding hepatotoxins and nephrotoxins in setting of shock liver/JIMENEZ). Moraxella in sputum. ID following, abx broadened to Zosyn.     HEME:  -DVT ppx w/ SC Heparin.     DISPO: Full Code.    CRITICAL CARE TIME SPENT:  38 minutes of critical care time spent providing medical care for patient's acute illness/conditions that impairs at least one vital organ system and/or poses a high risk of imminent or life threatening deterioration in the patient's condition. It includes time spent evaluating and treating the patient's acute illness as well as time spent reviewing labs, radiology, discussing goals of care with patient and/or patient's family, and discussing the case with a multidisciplinary team, including the eICU, in an effort to prevent further life threatening deterioration or end organ damage. This time is independent of any procedures performed.

## 2022-07-05 NOTE — PROGRESS NOTE ADULT - SUBJECTIVE AND OBJECTIVE BOX
Patient is a 77y old  Male who presents with a chief complaint of Substernal chest pain since last night (05 Jul 2022 12:20)      INTERVAL /OVERNIGHT EVENTS: still intubated, now febrile    MEDICATIONS  (STANDING):  aspirin  chewable 81 milliGRAM(s) Oral daily  chlorhexidine 0.12% Liquid 15 milliLiter(s) Oral Mucosa every 12 hours  chlorhexidine 2% Cloths 1 Application(s) Topical <User Schedule>  dexMEDEtomidine Infusion 0.2 MICROgram(s)/kG/Hr (4.15 mL/Hr) IV Continuous <Continuous>  dextrose 5%. 1000 milliLiter(s) (50 mL/Hr) IV Continuous <Continuous>  dextrose 5%. 1000 milliLiter(s) (100 mL/Hr) IV Continuous <Continuous>  dextrose 50% Injectable 25 Gram(s) IV Push once  dextrose 50% Injectable 25 Gram(s) IV Push once  dextrose 50% Injectable 12.5 Gram(s) IV Push once  DOBUTamine Infusion 5 MICROgram(s)/kG/Min (12.5 mL/Hr) IV Continuous <Continuous>  glucagon  Injectable 1 milliGRAM(s) IntraMuscular once  heparin   Injectable 5000 Unit(s) SubCutaneous every 12 hours  insulin regular Infusion 7 Unit(s)/Hr (7 mL/Hr) IV Continuous <Continuous>  norepinephrine Infusion 0.05 MICROgram(s)/kG/Min (7.78 mL/Hr) IV Continuous <Continuous>  pantoprazole  Injectable 40 milliGRAM(s) IV Push daily  piperacillin/tazobactam IVPB.. 3.375 Gram(s) IV Intermittent every 8 hours  propofol Infusion 10 MICROgram(s)/kG/Min (4.98 mL/Hr) IV Continuous <Continuous>  ticagrelor 90 milliGRAM(s) Oral every 12 hours    MEDICATIONS  (PRN):  dextrose Oral Gel 15 Gram(s) Oral once PRN Blood Glucose LESS THAN 70 milliGRAM(s)/deciliter      Allergies    No Known Allergies    Intolerances        REVIEW OF SYSTEMS:  unable to obtain    Vital Signs Last 24 Hrs  T(C): 38.3 (05 Jul 2022 15:28), Max: 39.3 (04 Jul 2022 23:47)  T(F): 101 (05 Jul 2022 15:28), Max: 102.7 (04 Jul 2022 23:47)  HR: 105 (05 Jul 2022 19:00) (90 - 120)  BP: 100/66 (05 Jul 2022 19:00) (83/52 - 194/94)  BP(mean): 78 (05 Jul 2022 19:00) (63 - 121)  RR: 33 (05 Jul 2022 19:00) (22 - 43)  SpO2: 99% (05 Jul 2022 19:00) (97% - 100%)    PHYSICAL EXAM:  GENERAL: NAD, well-groomed, well-developed  HEAD:  Atraumatic, Normocephalic  EYES: EOMI, PERRLA, conjunctiva and sclera clear  ENMT: No tonsillar erythema, exudates, or enlargement; Moist mucous membranes, Good dentition, No lesions  NECK: Supple, No JVD, Normal thyroid  NERVOUS SYSTEM: ; Motor Strength 5/5 B/L upper and lower extremities; DTRs 2+ intact and symmetric  CHEST/LUNG: Clear to auscultation bilaterally; No rales, rhonchi, wheezing, or rubs  HEART: Regular rate and rhythm; No murmurs, rubs, or gallops  ABDOMEN: Soft, Nontender, Nondistended; Bowel sounds present  EXTREMITIES:  2+ Peripheral Pulses, No clubbing, cyanosis, or edema  LYMPH: No lymphadenopathy noted  SKIN: No rashes or lesions    LABS:                        12.5   13.06 )-----------( 228      ( 05 Jul 2022 06:34 )             42.0     05 Jul 2022 16:05    157    |  116    |  81     ----------------------------<  265    3.4     |  35     |  2.10     Ca    9.0        05 Jul 2022 16:05  Phos  1.5       05 Jul 2022 16:05  Mg     2.6       05 Jul 2022 16:05    TPro  7.2    /  Alb  2.9    /  TBili  1.7    /  DBili  x      /  AST  880    /  ALT  3121   /  AlkPhos  189    05 Jul 2022 06:34    PT/INR - ( 04 Jul 2022 04:55 )   PT: 18.6 sec;   INR: 1.58 ratio         PTT - ( 04 Jul 2022 04:55 )  PTT:31.3 sec    CAPILLARY BLOOD GLUCOSE      POCT Blood Glucose.: 171 mg/dL (05 Jul 2022 18:58)  POCT Blood Glucose.: 188 mg/dL (05 Jul 2022 17:50)  POCT Blood Glucose.: 216 mg/dL (05 Jul 2022 17:02)  POCT Blood Glucose.: 200 mg/dL (05 Jul 2022 16:01)  POCT Blood Glucose.: 252 mg/dL (05 Jul 2022 14:58)  POCT Blood Glucose.: 283 mg/dL (05 Jul 2022 14:18)  POCT Blood Glucose.: 304 mg/dL (05 Jul 2022 13:09)  POCT Blood Glucose.: 341 mg/dL (05 Jul 2022 11:59)  POCT Blood Glucose.: 363 mg/dL (05 Jul 2022 10:57)  POCT Blood Glucose.: 373 mg/dL (05 Jul 2022 09:51)  POCT Blood Glucose.: 313 mg/dL (05 Jul 2022 08:58)  POCT Blood Glucose.: 348 mg/dL (05 Jul 2022 07:54)  POCT Blood Glucose.: 388 mg/dL (05 Jul 2022 07:01)  POCT Blood Glucose.: 497 mg/dL (05 Jul 2022 06:15)  POCT Blood Glucose.: 389 mg/dL (05 Jul 2022 05:12)  POCT Blood Glucose.: 473 mg/dL (05 Jul 2022 04:15)  POCT Blood Glucose.: 552 mg/dL (05 Jul 2022 03:15)  POCT Blood Glucose.: 568 mg/dL (05 Jul 2022 02:13)  POCT Blood Glucose.: 483 mg/dL (05 Jul 2022 01:04)  POCT Blood Glucose.: 466 mg/dL (04 Jul 2022 23:36)  POCT Blood Glucose.: 448 mg/dL (04 Jul 2022 21:14)      RADIOLOGY & ADDITIONAL TESTS:    Notes Reviewed:  [x ] YES  [ ] NO    Care Discussed with Consultants/Other Providers x[ ] YES  [ ] NO

## 2022-07-05 NOTE — PROGRESS NOTE ADULT - ASSESSMENT
The patient is a 77 year old male with a history of DM, CAD with prior MI s/p multiple PCI, chronic systolic heart failure s/p CRT-D who presents with chest pain and shortness of breath in the setting of acute systolic heart failure, cardiogenic shock, possible NSTEMI.    Plan:  - Febrile overnight - on ceftriaxone  - Evidence of cardiogenic and septic shock  - The patient has a history of multiple PCI including intervention on ISR and is not a candidate for CABG or future PCI  - Troponin peaked at 1360 in the setting of possible NSTEMI vs. heart failure/shock. No need to trend.  - Echo with severely reduced LV systolic function  - Continue aspirin 81 mg daily  - Continue ticagrelor 90 mg bid  - Completed heparin drip for medical management of NSTEMI  - Hold Entresto, spironolactone, carvedilol due to hypotension/shock  - Continue norepinephrine and wean off as tolerated  - Continue dobutamine 5 mcg/kg/min  - Consider lowering bumetanide due to worsening kidney function and hypernatremia  - The patient is not a candidate for advanced heart failure therapies  - [facility] discussions - overall poor prognosis - end stage heart disease with cardiogenic/septic shock  - ICU care

## 2022-07-05 NOTE — PROGRESS NOTE ADULT - SUBJECTIVE AND OBJECTIVE BOX
Patient is a 77y old  Male who presents with a chief complaint of Substernal chest pain since last night (05 Jul 2022 12:20)      INTERVAL HPI/OVERNIGHT EVENTS:    remains intubated and sedated on respirator    MEDICATIONS  (STANDING):  aspirin  chewable 81 milliGRAM(s) Oral daily  chlorhexidine 0.12% Liquid 15 milliLiter(s) Oral Mucosa every 12 hours  chlorhexidine 2% Cloths 1 Application(s) Topical <User Schedule>  dexMEDEtomidine Infusion 0.2 MICROgram(s)/kG/Hr (4.15 mL/Hr) IV Continuous <Continuous>  dextrose 5%. 1000 milliLiter(s) (50 mL/Hr) IV Continuous <Continuous>  dextrose 5%. 1000 milliLiter(s) (100 mL/Hr) IV Continuous <Continuous>  dextrose 50% Injectable 25 Gram(s) IV Push once  dextrose 50% Injectable 25 Gram(s) IV Push once  dextrose 50% Injectable 12.5 Gram(s) IV Push once  DOBUTamine Infusion 5 MICROgram(s)/kG/Min (12.5 mL/Hr) IV Continuous <Continuous>  glucagon  Injectable 1 milliGRAM(s) IntraMuscular once  heparin   Injectable 5000 Unit(s) SubCutaneous every 12 hours  insulin regular Infusion 7 Unit(s)/Hr (7 mL/Hr) IV Continuous <Continuous>  norepinephrine Infusion 0.05 MICROgram(s)/kG/Min (7.78 mL/Hr) IV Continuous <Continuous>  pantoprazole  Injectable 40 milliGRAM(s) IV Push daily  piperacillin/tazobactam IVPB.. 3.375 Gram(s) IV Intermittent every 8 hours  propofol Infusion 10 MICROgram(s)/kG/Min (4.98 mL/Hr) IV Continuous <Continuous>  ticagrelor 90 milliGRAM(s) Oral every 12 hours      MEDICATIONS  (PRN):  dextrose Oral Gel 15 Gram(s) Oral once PRN Blood Glucose LESS THAN 70 milliGRAM(s)/deciliter      Allergies    No Known Allergies    Intolerances        PAST MEDICAL & SURGICAL HISTORY:  Lung cancer, upper lobe, left      Heart attack  1990,2005      Stented coronary artery  six cardiac stents      Sleep apnea  uses c pap at home      HTN (hypertension)      High cholesterol      Diabetes  on metformin, humalog insulin pump      Insulin pump in place      Hiatal hernia      Vertigo      Stented coronary artery  Drug eluding x 6 stents      Type 2 diabetes mellitus      TIA (transient ischemic attack)  Jan 2014      Chronic back pain      Spinal stenosis in cervical region      Spinal stenosis of lumbar region      Lumbar herniated disc      OA (osteoarthritis)      CHF (congestive heart failure)      Cellulitis      S/P partial lobectomy of lung  lobectomy of left upper lobe. April 2014      History of throat surgery  1996      S/P angioplasty with stent  2005 and 2006          Vital Signs Last 24 Hrs  T(C): 38.3 (05 Jul 2022 15:28), Max: 39.3 (04 Jul 2022 23:47)  T(F): 101 (05 Jul 2022 15:28), Max: 102.7 (04 Jul 2022 23:47)  HR: 104 (05 Jul 2022 20:35) (90 - 120)  BP: 101/60 (05 Jul 2022 20:00) (83/52 - 194/94)  BP(mean): 75 (05 Jul 2022 20:00) (63 - 121)  RR: 33 (05 Jul 2022 20:00) (22 - 43)  SpO2: 99% (05 Jul 2022 20:35) (97% - 100%)    PHYSICAL EXAMINATION:    GENERAL: The patient is intubated and sedated    HEENT: Head is normocephalic and atraumatic.    NECK: no JVD    LUNGS: bilateral scattered rhonchi    HEART: Regular rate and rhythm without murmur.    ABDOMEN: Soft, nontender, and nondistended.      EXTREMITIES: bilateral pedal edema    NEUROLOGIC: Grossly intact.    SKIN: No ulceration or induration present.      LABS:                        12.5   13.06 )-----------( 228      ( 05 Jul 2022 06:34 )             42.0     07-05    157<H>  |  116<H>  |  81<H>  ----------------------------<  265<H>  3.4<L>   |  35<H>  |  2.10<H>    Ca    9.0      05 Jul 2022 16:05  Phos  1.5     07-05  Mg     2.6     07-05    TPro  7.2  /  Alb  2.9<L>  /  TBili  1.7<H>  /  DBili  x   /  AST  880<H>  /  ALT  3121<H>  /  AlkPhos  189<H>  07-05    PT/INR - ( 04 Jul 2022 04:55 )   PT: 18.6 sec;   INR: 1.58 ratio         PTT - ( 04 Jul 2022 04:55 )  PTT:31.3 sec    ABG - ( 04 Jul 2022 04:27 )  pH, Arterial: 7.51  pH, Blood: x     /  pCO2: 43    /  pO2: 131   / HCO3: 33    / Base Excess: 10.8  /  SaO2: 99.1                      Lactate, Blood: 3.5 mmol/L (07-05-22 @ 10:00)  Lactate, Blood: 3.1 mmol/L (07-05-22 @ 06:34)        MICROBIOLOGY:  Culture Results:   Moderate Moraxella catarrhalis "Susceptibilities not performed"  Normal Respiratory Janiya present (07-02 @ 09:30)  Culture Results:   No growth to date. (07-02 @ 09:20)  Culture Results:   No growth to date. (07-02 @ 09:15)        Assessment:    Acute on Chronic Hypoxic respiratory failure  Cardiogenic Shock  Acute on Chronic Systolic Congestive heart failure  Acute Kidney Injury  Coronary artery disease  Sepsis    Plan:    Mechanical ventilatory support  Not weanable from respirator  IV Sedation  Cardiac monitoring  Continue IV pressors (Dobutamine + Norepinephrine)  IV Zosyn  Prognosis remains poor

## 2022-07-05 NOTE — PROGRESS NOTE ADULT - SUBJECTIVE AND OBJECTIVE BOX
Patient is a 77y old  Male who presents with a chief complaint of Substernal chest pain since last night (05 Jul 2022 07:31)    Patient seen in follow up for JIMENEZ.        PAST MEDICAL HISTORY:  Lung cancer, upper lobe, left    Heart attack    Stented coronary artery    Sleep apnea    HTN (hypertension)    High cholesterol    Diabetes    Insulin pump in place    Hiatal hernia    Vertigo    Stented coronary artery    Type 2 diabetes mellitus    TIA (transient ischemic attack)    Chronic back pain    Spinal stenosis in cervical region    Spinal stenosis of lumbar region    Lumbar herniated disc    OA (osteoarthritis)    CHF (congestive heart failure)    Cellulitis      MEDICATIONS  (STANDING):  aspirin  chewable 81 milliGRAM(s) Oral daily  cefTRIAXone   IVPB      cefTRIAXone   IVPB 1000 milliGRAM(s) IV Intermittent every 24 hours  chlorhexidine 0.12% Liquid 15 milliLiter(s) Oral Mucosa every 12 hours  chlorhexidine 2% Cloths 1 Application(s) Topical <User Schedule>  dexMEDEtomidine Infusion 0.2 MICROgram(s)/kG/Hr (4.15 mL/Hr) IV Continuous <Continuous>  dextrose 5%. 1000 milliLiter(s) (50 mL/Hr) IV Continuous <Continuous>  dextrose 5%. 1000 milliLiter(s) (100 mL/Hr) IV Continuous <Continuous>  dextrose 50% Injectable 25 Gram(s) IV Push once  dextrose 50% Injectable 12.5 Gram(s) IV Push once  dextrose 50% Injectable 25 Gram(s) IV Push once  DOBUTamine Infusion 5 MICROgram(s)/kG/Min (12.5 mL/Hr) IV Continuous <Continuous>  glucagon  Injectable 1 milliGRAM(s) IntraMuscular once  heparin   Injectable 5000 Unit(s) SubCutaneous every 12 hours  insulin regular Infusion 7 Unit(s)/Hr (7 mL/Hr) IV Continuous <Continuous>  norepinephrine Infusion 0.05 MICROgram(s)/kG/Min (7.78 mL/Hr) IV Continuous <Continuous>  pantoprazole  Injectable 40 milliGRAM(s) IV Push daily  potassium chloride  20 mEq/100 mL IVPB 20 milliEquivalent(s) IV Intermittent every 2 hours  propofol Infusion 10 MICROgram(s)/kG/Min (4.98 mL/Hr) IV Continuous <Continuous>  ticagrelor 90 milliGRAM(s) Oral every 12 hours    MEDICATIONS  (PRN):  dextrose Oral Gel 15 Gram(s) Oral once PRN Blood Glucose LESS THAN 70 milliGRAM(s)/deciliter    T(C): 36.7 (07-05-22 @ 07:41), Max: 39.3 (07-04-22 @ 23:47)  HR: 92 (07-05-22 @ 10:00) (73 - 120)  BP: 103/64 (07-05-22 @ 10:00) (83/52 - 194/94)  RR: 36 (07-05-22 @ 10:00) (0 - 43)  SpO2: 98% (07-05-22 @ 10:00) (96% - 100%)  Wt(kg): --  I&O's Detail    04 Jul 2022 07:01  -  05 Jul 2022 07:00  --------------------------------------------------------  IN:    Dexmedetomidine: 26 mL    DOBUTamine: 18.6 mL    DOBUTamine: 212.5 mL    DOBUTamine: 5 mL    Enteral Tube Flush: 110 mL    Insulin: 21 mL    Insulin: 3 mL    Insulin: 20 mL    Insulin: 7 mL    Insulin: 5 mL    IV PiggyBack: 400 mL    IV PiggyBack: 50 mL    IV PiggyBack: 250 mL    Nepro with Carb Steady: 960 mL    Norepinephrine: 630.1 mL    Propofol: 182.5 mL  Total IN: 2900.7 mL    OUT:    Indwelling Catheter - Urethral (mL): 4600 mL  Total OUT: 4600 mL    Total NET: -1699.3 mL      05 Jul 2022 07:01  -  05 Jul 2022 11:08  --------------------------------------------------------  IN:    DOBUTamine: 37.5 mL    Insulin: 22 mL    IV PiggyBack: 50 mL    Nepro with Carb Steady: 120 mL    Norepinephrine: 121.5 mL    Propofol: 37.5 mL  Total IN: 388.5 mL    OUT:  Total OUT: 0 mL    Total NET: 388.5 mL          PHYSICAL EXAM:  General: on vent  Respiratory: b/l air entry  Cardiovascular: S1 S2  Gastrointestinal: soft  Extremities:  edema    Mode: AC/ CMV (Assist Control/ Continuous Mandatory Ventilation), RR (machine): 22, TV (machine): 450, FiO2: 40, PEEP: 5, ITime: 1, MAP: 10, PIP: 21                          12.5   13.06 )-----------( 228      ( 05 Jul 2022 06:34 )             42.0     07-05    154<H>  |  114<H>  |  82<H>  ----------------------------<  461<HH>  3.1<L>   |  33<H>  |  2.00<H>    Ca    9.3      05 Jul 2022 06:34  Phos  2.6     07-05  Mg     2.9     07-05    TPro  7.2  /  Alb  2.9<L>  /  TBili  1.7<H>  /  DBili  x   /  AST  880<H>  /  ALT  3121<H>  /  AlkPhos  189<H>  07-05        LIVER FUNCTIONS - ( 05 Jul 2022 06:34 )  Alb: 2.9 g/dL / Pro: 7.2 g/dL / ALK PHOS: 189 U/L / ALT: 3121 U/L / AST: 880 U/L / GGT: x             ABG - ( 04 Jul 2022 04:27 )  pH, Arterial: 7.51  pH, Blood: x     /  pCO2: 43    /  pO2: 131   / HCO3: 33    / Base Excess: 10.8  /  SaO2: 99.1              Sodium, Serum: 154 (07-05 @ 06:34)  Sodium, Serum: 147 (07-04 @ 04:55)  Sodium, Serum: 145 (07-03 @ 17:38)  Sodium, Serum: 142 (07-03 @ 06:20)    Creatinine, Serum: 2.00 (07-05 @ 06:34)  Creatinine, Serum: 1.90 (07-04 @ 04:55)  Creatinine, Serum: 1.80 (07-03 @ 17:38)  Creatinine, Serum: 2.00 (07-03 @ 06:20)  Creatinine, Serum: 2.30 (07-02 @ 19:50)  Creatinine, Serum: 2.40 (07-02 @ 11:30)  Creatinine, Serum: 2.60 (07-02 @ 03:20)  Creatinine, Serum: 2.50 (07-02 @ 00:36)    Potassium, Serum: 3.1 (07-05 @ 06:34)  Potassium, Serum: 3.5 (07-04 @ 04:55)  Potassium, Serum: 3.6 (07-03 @ 17:38)  Potassium, Serum: 3.9 (07-03 @ 06:20)    Hemoglobin: 12.5 (07-05 @ 06:34)  Hemoglobin: 12.4 (07-04 @ 04:55)  Hemoglobin: 11.6 (07-03 @ 06:20)  Hemoglobin: 11.5 (07-02 @ 03:20)

## 2022-07-06 NOTE — PROGRESS NOTE ADULT - SUBJECTIVE AND OBJECTIVE BOX
CAPILLARY BLOOD GLUCOSE      POCT Blood Glucose.: 174 mg/dL (06 Jul 2022 15:50)  POCT Blood Glucose.: 163 mg/dL (06 Jul 2022 15:11)  POCT Blood Glucose.: 185 mg/dL (06 Jul 2022 13:48)  POCT Blood Glucose.: 162 mg/dL (06 Jul 2022 13:01)  POCT Blood Glucose.: 179 mg/dL (06 Jul 2022 12:01)  POCT Blood Glucose.: 223 mg/dL (06 Jul 2022 10:54)  POCT Blood Glucose.: 186 mg/dL (06 Jul 2022 10:15)  POCT Blood Glucose.: 174 mg/dL (06 Jul 2022 08:59)  POCT Blood Glucose.: 185 mg/dL (06 Jul 2022 07:58)  POCT Blood Glucose.: 204 mg/dL (06 Jul 2022 06:52)  POCT Blood Glucose.: 231 mg/dL (06 Jul 2022 05:55)  POCT Blood Glucose.: 210 mg/dL (06 Jul 2022 05:08)  POCT Blood Glucose.: 189 mg/dL (06 Jul 2022 04:03)  POCT Blood Glucose.: 192 mg/dL (06 Jul 2022 03:04)  POCT Blood Glucose.: 136 mg/dL (06 Jul 2022 02:12)  POCT Blood Glucose.: 175 mg/dL (06 Jul 2022 01:09)  POCT Blood Glucose.: 201 mg/dL (05 Jul 2022 23:57)  POCT Blood Glucose.: 203 mg/dL (05 Jul 2022 22:55)  POCT Blood Glucose.: 185 mg/dL (05 Jul 2022 21:54)  POCT Blood Glucose.: 161 mg/dL (05 Jul 2022 20:55)  POCT Blood Glucose.: 147 mg/dL (05 Jul 2022 20:12)  POCT Blood Glucose.: 171 mg/dL (05 Jul 2022 18:58)  POCT Blood Glucose.: 188 mg/dL (05 Jul 2022 17:50)  POCT Blood Glucose.: 216 mg/dL (05 Jul 2022 17:02)      Vital Signs Last 24 Hrs  T(C): 38.4 (06 Jul 2022 16:21), Max: 38.9 (05 Jul 2022 21:20)  T(F): 101.1 (06 Jul 2022 16:21), Max: 102.1 (05 Jul 2022 21:20)  HR: 99 (06 Jul 2022 16:21) (87 - 119)  BP: 107/70 (06 Jul 2022 13:30) (81/60 - 116/69)  BP(mean): 83 (06 Jul 2022 13:30) (64 - 87)  RR: 35 (06 Jul 2022 13:30) (24 - 41)  SpO2: 93% (06 Jul 2022 16:21) (93% - 100%)    Respiratory: CTA B/L  CV: RRR, S1S2, no murmurs, rubs or gallops  Abdominal: Soft, NT, ND +BS, Last BM  Extremities: No edema, + peripheral pulses     07-06    159<H>  |  121<H>  |  72<H>  ----------------------------<  204<H>  4.4   |  35<H>  |  1.80<H>    Ca    8.6      06 Jul 2022 15:25  Phos  2.8     07-06  Mg     2.5     07-06    TPro  6.9  /  Alb  2.6<L>  /  TBili  1.4<H>  /  DBili  x   /  AST  229<H>  /  ALT  1644<H>  /  AlkPhos  176<H>  07-06      dextrose 50% Injectable 25 Gram(s) IV Push once  dextrose 50% Injectable 12.5 Gram(s) IV Push once  dextrose 50% Injectable 25 Gram(s) IV Push once  dextrose Oral Gel 15 Gram(s) Oral once PRN  glucagon  Injectable 1 milliGRAM(s) IntraMuscular once  insulin regular Infusion 12 Unit(s)/Hr IV Continuous <Continuous>

## 2022-07-06 NOTE — PROGRESS NOTE ADULT - SUBJECTIVE AND OBJECTIVE BOX
[facility]  INFECTIOUS DISEASES   [address] [place] [address]  Tel: [phone]     Fax: [phone]  ======================================================  MD Thao Moore Kaushal, MD Cho, Michelle, MD   ======================================================    [id number]  BEKAH AMOR     Follow up: Pneumonia    Intubated and sedated, had fever 102.1.   Leukocytosis slightly worse.     PAST MEDICAL & SURGICAL HISTORY:  Lung cancer, upper lobe, left  Heart attack  1990,2005  Stented coronary artery  six cardiac stents  Sleep apnea  uses c pap at home  HTN (hypertension)  High cholesterol  Diabetes  on metformin, humalog insulin pump  Insulin pump in place  Hiatal hernia  Vertigo  Stented coronary artery  Drug eluding x 6 stents  Type 2 diabetes mellitus  TIA (transient ischemic attack)  Jan 2014  Chronic back pain  Spinal stenosis in cervical region  Spinal stenosis of lumbar region  Lumbar herniated disc  OA (osteoarthritis)  CHF (congestive heart failure)  Cellulitis  S/P partial lobectomy of lung  lobectomy of left upper lobe. April 2014  History of throat surgery  1996  S/P angioplasty with stent  2005 and 2006    Social Hx: No smoking, ETOH or drugs     FAMILY HISTORY:  No pertinent family history in first degree relatives    Allergies  No Known Allergies    Antibiotics:  Ceftriaxone      REVIEW OF SYSTEMS:  Intubated     Physical Exam:  Vital Signs Last 24 Hrs  T(C): 37.8 (06 Jul 2022 08:45), Max: 38.9 (05 Jul 2022 21:20)  T(F): 100 (06 Jul 2022 08:45), Max: 102.1 (05 Jul 2022 21:20)  HR: 96 (06 Jul 2022 11:26) (87 - 109)  BP: 109/68 (06 Jul 2022 11:00) (81/60 - 111/65)  BP(mean): 82 (06 Jul 2022 11:00) (64 - 82)  RR: 41 (06 Jul 2022 11:00) (24 - 41)  SpO2: 99% (06 Jul 2022 11:26) (93% - 100%)  GEN: Intubated, NG tube +   HEENT: normocephalic and atraumatic.   NECK: Supple.  No lymphadenopathy   LUNGS: Clear to auscultation. R IJ looks fine   HEART: Regular rate and rhythm without murmur.  ABDOMEN: Soft, nontender, and nondistended.  Positive bowel sounds.    : No CVA tenderness  EXTREMITIES: Without any cyanosis, clubbing, rash, lesions or edema.  NEUROLOGIC: unable   PSYCHIATRIC: sedated   SKIN: No rash or ulcer     Labs:                        11.3   14.35 )-----------( 195      ( 06 Jul 2022 06:27 )             38.4     07-06    156<H>  |  116<H>  |  79<H>  ----------------------------<  242<H>  3.1<L>   |  34<H>  |  1.80<H>    Ca    8.5      06 Jul 2022 06:27  Phos  2.8     07-06  Mg     2.5     07-06    TPro  6.6  /  Alb  2.4<L>  /  TBili  1.4<H>  /  DBili  x   /  AST  285<H>  /  ALT  1788<H>  /  AlkPhos  166<H>  07-06    Culture - Sputum (collected 07-02-22 @ 09:30)  Source: ET Tube ET Tube  Gram Stain (07-02-22 @ 17:36):    Few Squamous epithelial cells per low power field    Few polymorphonuclear leukocytes per low power field    Few Gram positive cocci in pairs per oil power field  Final Report (07-04-22 @ 19:46):    Moderate Moraxella catarrhalis "Susceptibilities not performed"    Normal Respiratory Santana present    Culture - Blood (collected 07-02-22 @ 09:20)  Source: .Blood Blood-Peripheral    Culture - Blood (collected 07-02-22 @ 09:15)  Source: .Blood Blood-Peripheral    Culture - Urine (collected 06-30-22 @ 10:00)  Source: Clean Catch Clean Catch (Midstream)  Final Report (07-01-22 @ 13:51):    <10,000 CFU/mL Normal Urogenital Santana    Culture - Blood (collected 06-30-22 @ 09:15)  Source: .Blood Blood-Peripheral  Final Report (07-05-22 @ 12:00):    No Growth Final    Culture - Blood (collected 06-30-22 @ 09:05)  Source: .Blood Blood-Peripheral  Final Report (07-05-22 @ 12:00):    No Growth Final    WBC Count: 14.35 K/uL (07-06-22 @ 06:27)  WBC Count: 13.06 K/uL (07-05-22 @ 06:34)  WBC Count: 10.72 K/uL (07-04-22 @ 04:55)  WBC Count: 10.18 K/uL (07-03-22 @ 06:20)  WBC Count: 16.50 K/uL (07-02-22 @ 03:20)  WBC Count: 16.63 K/uL (07-02-22 @ 00:36)    Creatinine, Serum: 1.80 mg/dL (07-06-22 @ 06:27)  Creatinine, Serum: 2.10 mg/dL (07-05-22 @ 16:05)  Creatinine, Serum: 2.00 mg/dL (07-05-22 @ 06:34)  Creatinine, Serum: 1.90 mg/dL (07-04-22 @ 04:55)  Creatinine, Serum: 1.80 mg/dL (07-03-22 @ 17:38)  Creatinine, Serum: 2.00 mg/dL (07-03-22 @ 06:20)  Creatinine, Serum: 2.30 mg/dL (07-02-22 @ 19:50)  Creatinine, Serum: 2.40 mg/dL (07-02-22 @ 11:30)  Creatinine, Serum: 2.60 mg/dL (07-02-22 @ 03:20)  Creatinine, Serum: 2.50 mg/dL (07-02-22 @ 00:36)  Creatinine, Serum: 2.40 mg/dL (07-02-22 @ 00:36)    Procalcitonin, Serum: 0.11 ng/mL (06-30-22 @ 09:20)     SARS-CoV-2 Result: NotDetec (06-30-22 @ 09:20)    All imaging and other data have been reviewed.  < from: Xray Chest 1 View-PORTABLE IMMEDIATE (Xray Chest 1 View-PORTABLE IMMEDIATE .) (07.02.22 @ 00:16) >  Impression:  Airspace opacities slightly improved on the left and stable on the right.  Findings may be due to pulmonary edema but underlying   infection/inflammation not excluded.    < from: CT Chest No Cont (06.30.22 @ 12:16) >  IMPRESSION:  Interstitial edema and bilateral pleural effusions suggest an element of   CHF/fluid overload.  Airspace disease throughout right lung may represent pulmonary edema.   Inflammation/infection not excluded.    Assessment and Plan:   76 yo man with PMH of HTN, DM2, Lung cancer, CHF, CAD, PAPO, and chronic back pain was admitted with chest discomfort on 6/30. Pain was same as his past MIs.   On 7/1 early morning was intubated and had a very high lactate and troponin. On 7/2 was started on ceftriaxone for possible pneumonia, and ET aspirate was sent for culture  that is now back Moraxella and normal respiratory santana.  Chest CT is more consistent with edema rather than pneumonia but since has fever will continue on antibiotics.   No other source of infection at this time but will need a full fever work up. Multiorgan failure   MRSA PCR negative so MRSA pneumonia less likely.  With shock could have ischemia in organs including bowel, had a very high lactate.     Recommendations:   - Blood culture x2 NGTD   - UA and UC negative   - ET tube culture Moraxella that could be colonizer  - Fever and leukocytosis could be reactional to shock and MI, but also high risk for infection   - High creat and transaminitis, will trend   - Continue Zosyn started yesterday.   - Watch lines, Has a central line in R IJ looks fine for now    Will follow.  Discussed with the primary team.     Carlos Hidalgo MD  Division of Infectious Diseases   Please call ID service at [phone] with any question.      35 minutes spent on total encounter assessing patient, examination, chart reivew, counseling and coordinating care by the attending physician/nurse/care manager.

## 2022-07-06 NOTE — PROGRESS NOTE ADULT - ASSESSMENT
JIMENEZ on CKD 3  CHF  Shock on pressors  h/o Hypertension  Diabetes  Hypernatremia    Stable renal indices. Started on IVF D5W. On free water with tube feeds. Taper pressors as tolerated. Monitor blood sugar levels. Insulin coverage as needed.  Will follow electrolytes and renal function trend. Avoid nephrotoxic meds as possible. D/w ICU team.

## 2022-07-06 NOTE — PROGRESS NOTE ADULT - SUBJECTIVE AND OBJECTIVE BOX
Patient is a 77y old  Male who presents with a chief complaint of Substernal chest pain since last night (06 Jul 2022 16:51)  remains intubated in ICU, critically ill      INTERVAL HPI/OVERNIGHT EVENTS:  T(C): 38.4 (07-06-22 @ 16:21), Max: 38.9 (07-05-22 @ 21:20)  HR: 99 (07-06-22 @ 16:21) (87 - 119)  BP: 107/70 (07-06-22 @ 13:30) (81/60 - 116/69)  RR: 35 (07-06-22 @ 13:30) (24 - 41)  SpO2: 93% (07-06-22 @ 16:21) (93% - 100%)  Wt(kg): --  I&O's Summary    05 Jul 2022 07:01  -  06 Jul 2022 07:00  --------------------------------------------------------  IN: 4998.2 mL / OUT: 2675 mL / NET: 2323.2 mL    06 Jul 2022 07:01  -  06 Jul 2022 17:08  --------------------------------------------------------  IN: 1365.9 mL / OUT: 435 mL / NET: 930.9 mL        LABS:                        11.3   14.35 )-----------( 195      ( 06 Jul 2022 06:27 )             38.4     07-06    159<H>  |  121<H>  |  72<H>  ----------------------------<  204<H>  4.4   |  35<H>  |  1.80<H>    Ca    8.6      06 Jul 2022 15:25  Phos  2.8     07-06  Mg     2.5     07-06    TPro  6.9  /  Alb  2.6<L>  /  TBili  1.4<H>  /  DBili  x   /  AST  229<H>  /  ALT  1644<H>  /  AlkPhos  176<H>  07-06    PT/INR - ( 06 Jul 2022 06:27 )   PT: 14.8 sec;   INR: 1.26 ratio         PTT - ( 06 Jul 2022 06:27 )  PTT:30.1 sec    CAPILLARY BLOOD GLUCOSE      POCT Blood Glucose.: 174 mg/dL (06 Jul 2022 15:50)  POCT Blood Glucose.: 163 mg/dL (06 Jul 2022 15:11)  POCT Blood Glucose.: 185 mg/dL (06 Jul 2022 13:48)  POCT Blood Glucose.: 162 mg/dL (06 Jul 2022 13:01)  POCT Blood Glucose.: 179 mg/dL (06 Jul 2022 12:01)  POCT Blood Glucose.: 223 mg/dL (06 Jul 2022 10:54)  POCT Blood Glucose.: 186 mg/dL (06 Jul 2022 10:15)  POCT Blood Glucose.: 174 mg/dL (06 Jul 2022 08:59)  POCT Blood Glucose.: 185 mg/dL (06 Jul 2022 07:58)  POCT Blood Glucose.: 204 mg/dL (06 Jul 2022 06:52)  POCT Blood Glucose.: 231 mg/dL (06 Jul 2022 05:55)  POCT Blood Glucose.: 210 mg/dL (06 Jul 2022 05:08)  POCT Blood Glucose.: 189 mg/dL (06 Jul 2022 04:03)  POCT Blood Glucose.: 192 mg/dL (06 Jul 2022 03:04)  POCT Blood Glucose.: 136 mg/dL (06 Jul 2022 02:12)  POCT Blood Glucose.: 175 mg/dL (06 Jul 2022 01:09)  POCT Blood Glucose.: 201 mg/dL (05 Jul 2022 23:57)  POCT Blood Glucose.: 203 mg/dL (05 Jul 2022 22:55)  POCT Blood Glucose.: 185 mg/dL (05 Jul 2022 21:54)  POCT Blood Glucose.: 161 mg/dL (05 Jul 2022 20:55)  POCT Blood Glucose.: 147 mg/dL (05 Jul 2022 20:12)  POCT Blood Glucose.: 171 mg/dL (05 Jul 2022 18:58)  POCT Blood Glucose.: 188 mg/dL (05 Jul 2022 17:50)            MEDICATIONS  (STANDING):  aspirin  chewable 81 milliGRAM(s) Oral daily  chlorhexidine 0.12% Liquid 15 milliLiter(s) Oral Mucosa every 12 hours  chlorhexidine 2% Cloths 1 Application(s) Topical <User Schedule>  dextrose 5%. 1000 milliLiter(s) (100 mL/Hr) IV Continuous <Continuous>  dextrose 5%. 1000 milliLiter(s) (100 mL/Hr) IV Continuous <Continuous>  dextrose 5%. 1000 milliLiter(s) (50 mL/Hr) IV Continuous <Continuous>  dextrose 50% Injectable 25 Gram(s) IV Push once  dextrose 50% Injectable 12.5 Gram(s) IV Push once  dextrose 50% Injectable 25 Gram(s) IV Push once  DOBUTamine Infusion 5 MICROgram(s)/kG/Min (12.5 mL/Hr) IV Continuous <Continuous>  glucagon  Injectable 1 milliGRAM(s) IntraMuscular once  heparin   Injectable 5000 Unit(s) SubCutaneous every 12 hours  insulin regular Infusion 12 Unit(s)/Hr (12 mL/Hr) IV Continuous <Continuous>  norepinephrine Infusion 0.05 MICROgram(s)/kG/Min (7.78 mL/Hr) IV Continuous <Continuous>  pantoprazole  Injectable 40 milliGRAM(s) IV Push daily  piperacillin/tazobactam IVPB.. 3.375 Gram(s) IV Intermittent every 8 hours  propofol Infusion 10 MICROgram(s)/kG/Min (4.98 mL/Hr) IV Continuous <Continuous>  ticagrelor 90 milliGRAM(s) Oral every 12 hours    MEDICATIONS  (PRN):  dextrose Oral Gel 15 Gram(s) Oral once PRN Blood Glucose LESS THAN 70 milliGRAM(s)/deciliter        RADIOLOGY & ADDITIONAL TESTS:    Imaging Personally Reviewed:  [x ] YES  [ ] NO    Consultant(s) Notes Reviewed:  [x ] YES  [ ] NO    PHYSICAL EXAM:  GENERAL: NAD,   HEAD:  Atraumatic, Normocephalic  EYES: EOMI, PERRLA, conjunctiva and sclera clear  ENMT: No tonsillar erythema, exudates, or enlargement; Moist mucous membranes, Good dentition, No lesions  NECK: Supple, No JVD, Normal thyroid  NERVOUS SYSTEM:  intubated and sedated  CHEST/LUNG: decreased breath sounds bilaterally  HEART: Regular rate and rhythm; No murmurs, rubs, or gallops  ABDOMEN: Soft, Nontender, Nondistended; Bowel sounds present  EXTREMITIES:  2+ Peripheral Pulses, No clubbing, cyanosis, or edema  LYMPH: No lymphadenopathy noted  SKIN: No rashes or lesions    Care Discussed with Consultants/Other Providers [x ] YES  [ ] NO

## 2022-07-06 NOTE — PROGRESS NOTE ADULT - ASSESSMENT
77M significant PMH CAD s/p MEL x 6 (most recent MI 2 mo ago), HFrEF, BiV-ICD, chronic back pain, DM2 on metformin, Hiatal hernia, HLD, HTN, Lung CA, and PAPO originally a/w NSTEMI with cardiogenic shock component and downgraded.  Now return to ICU for ADHF, Cardiogenic shock, acute hypoxemic RF 2/2 cardiogenic pulmonary edema, worsening JIMENEZ and transaminitis.    Neuro:   - Sedated on Propofol and Precedex    CV:   - ADHF and Cardiogenic shock  - Continue Levophed and Dobutamine  - BNP 71730, will diurese PRN  - Continue ASA and Brilinta  - Trop: 1071.8-->1740.6-->1573, no need to further trend as trop has peaked    - TTE: severe reduced LV systolic function, EF:30%, increase LV wall thickness, mild pulmonary HTN  - Off of statin given shock liver, may restart when appropriate      Resp:   - Acute hypoxemic respiratory failure 2/2 cardiogenic pulm edema  - Continue lung protective ventilation AC/CMV: 22/450/5/40  - CXR 7/2: Airspace opacities slightly improved on the left and stable on the right.May be due to pulmonary edema vs underlying infection/inflammation   - Aggressive chest PT and suctioning  - F/u repeat CXR    GI:   - NPO with TF  - Shock liver, transaminitis improving, will continue to trend   - Off of statin given shock liver, may restart when appropriate      Renal:  - JIMENEZ likely pre renal, improving HAGMA    - Monitor Cr, improving    - Hypernatremia today Na: 154  - Discontinue Bumex and diurese PRN  - Start free water 250 cc q6h  - Hypokalemia K: 3.1 today, repleted, f/u repeat labs 16:00    - BMP q6h while on insulin drip   - Strict I&Os    ID:   - lactate downtrending  - Continue Rocephin  - 6/30 BCx x2 negative  - 7/2 BCx x2 NGTD  - 7/2 Sputum cx with Moraxella f/u sensitivities   - MRSA negative   - ID Dr. Hidalgo consulted   - Monitor WBC and fever     Heme:   - C/w ASA/brilinta  - DVT PPX HSQ    Endo:  - DMT2 on ISS  - Hyperglycemia overnight, continue insulin drip and q1h FS   - BMP q6h while on insulin drip   - Goal -180    Skin:   - Bryant  - RIJ central line     Dispo:  Critically ill requiring ICU level of care.      Wife updated at bedside.  77M significant PMH CAD s/p MEL x 6 (most recent MI 2 mo ago), HFrEF, BiV-ICD, chronic back pain, DM2 on metformin, Hiatal hernia, HLD, HTN, Lung CA, and PAPO originally a/w NSTEMI with cardiogenic shock component and downgraded.  Now return to ICU for ADHF, Cardiogenic shock, acute hypoxemic RF 2/2 cardiogenic pulmonary edema, worsening JIMENEZ and transaminitis.    Neuro:   - Sedated on Propofol     CV:   - ADHF and Cardiogenic shock  - Continue Levophed and Dobutamine  - BNP 35630, will diurese PRN  - Continue ASA and Brilinta  - Trop: 1071.8-->1740.6-->1573, no need to further trend as trop has peaked    - TTE: severe reduced LV systolic function, EF:30%, increase LV wall thickness, mild pulmonary HTN  - Off of statin given shock liver, may restart when appropriate      Resp:   - Acute hypoxemic respiratory failure 2/2 cardiogenic pulm edema  - Continue lung protective ventilation AC/CMV: 22/450/5/40  - CXR 7/2: Airspace opacities slightly improved on the left and stable on the right. May be due to pulmonary edema vs underlying infection/inflammation   - Aggressive chest PT and suctioning  - F/u repeat CXR read     GI:   - NPO with TF  - Shock liver, transaminitis improving, will continue to trend   - Off of statin given shock liver, may restart when appropriate      Renal:  - JIMENEZ likely pre renal, improving HAGMA    - Monitor Cr, improving    - Hypernatremia today Na: 156  - Diurese PRN  - Continue free water 250 cc q6h  - Hypokalemia K: 3.1, repleted today  - Strict I&Os    ID:   - Fever overnight   - Rocephin changed to Zosyn yesterday as per ID recs   - 6/30 BCx x2 negative  - 7/2 BCx x2 NGTD  - 7/2 Sputum cx with Moraxella f/u sensitivities   - MRSA negative   - Monitor WBC and fever     Heme:   - C/w ASA/brilinta  - DVT PPX HSQ    Endo:  - DMT2 on ISS  - Continue insulin drip and q1h FS, BG improving   - Goal -180    Skin:   - Bryant  - RIJ central line     Dispo:  Critically ill requiring ICU level of care.    GOC discussion with family today  77M significant PMH CAD s/p MEL x 6 (most recent MI 2 mo ago), HFrEF, BiV-ICD, chronic back pain, DM2 on metformin, Hiatal hernia, HLD, HTN, Lung CA, and PAPO originally a/w NSTEMI with cardiogenic shock component and downgraded.  Now return to ICU for ADHF, Cardiogenic shock, acute hypoxemic RF 2/2 cardiogenic pulmonary edema, worsening JIMENEZ and transaminitis. Now with component of septic shock.     Neuro:   - Sedated on Propofol, will wean as tolerated      CV:   - ADHF  - Cardiogenic shock has resolved  - Continue Levophed and Dobutamine   - BNP 58212, will diurese PRN   - Continue ASA and Brilinta  - Trop: 1071.8-->1740.6-->1573, no need to further trend as trop has peaked    - TTE: severe reduced LV systolic function, EF:30%, increase LV wall thickness, mild pulmonary HTN  - Off of statin given shock liver, may restart when appropriate      Resp:   - Acute hypoxemic respiratory failure 2/2 cardiogenic pulm edema  - Continue lung protective ventilation AC/CMV: 22/450/5/40, no changes in vent settings today   - CXR 7/2: Airspace opacities slightly improved on the left and stable on the right. May be due to pulmonary edema vs underlying infection/inflammation   - Aggressive chest PT and suctioning  - F/u repeat CXR read     GI:   - NPO with TF  - Shock liver, transaminitis improving, will continue to trend   - Off of statin given shock liver, may restart when appropriate      Renal:  - JIMENEZ likely pre renal, improving HAGMA    - Monitor Cr, improving    - Hypernatremia today Na: 156  - Diurese PRN  - Continue free water 250 cc q6h  - Hypokalemia K: 3.1, repleted today  - Strict I&Os    ID:   - Fever overnight   - Rocephin changed to Zosyn yesterday as per ID recs   - 6/30 BCx x2 negative  - 7/2 BCx x2 NGTD  - 7/2 Sputum cx with Moraxella f/u sensitivities   - MRSA negative   - Monitor WBC and fever     Heme:   - C/w ASA/brilinta  - DVT PPX HSQ    Endo:  - DMT2 on ISS  - Continue insulin drip and q1h FS, BG improving   - Goal -180    Skin:   - Bryant  - RIJ central line     Dispo:  Critically ill requiring ICU level of care.    GOC discussion with family today  77M significant PMH CAD s/p MEL x 6 (most recent MI 2 mo ago), HFrEF, BiV-ICD, chronic back pain, DM2 on metformin, Hiatal hernia, HLD, HTN, Lung CA, and PAPO originally a/w NSTEMI with cardiogenic shock component and downgraded.  Now return to ICU for ADHF, Cardiogenic shock, acute hypoxemic RF 2/2 cardiogenic pulmonary edema, worsening JIMENEZ and transaminitis. Now with component of septic shock.     Neuro:   - Sedated on Propofol, will wean as tolerated      CV:   - ADHF  - Cardiogenic shock has resolved  - Continue Levophed and Dobutamine   - BNP 68514, will diurese PRN   - Continue ASA and Brilinta  - Trop: 1071.8-->1740.6-->1573, no need to further trend as trop has peaked    - TTE: severe reduced LV systolic function, EF:30%, increase LV wall thickness, mild pulmonary HTN  - Off of statin given shock liver, may restart when appropriate      Resp:   - Acute hypoxemic respiratory failure 2/2 cardiogenic pulm edema  - Continue lung protective ventilation AC/CMV: 22/450/5/40, no changes in vent settings today   - CXR 7/2: Airspace opacities slightly improved on the left and stable on the right. May be due to pulmonary edema vs underlying infection/inflammation   - Aggressive chest PT and suctioning  - F/u repeat CXR read     GI:   - NPO with TF  - Shock liver, transaminitis improving, will continue to trend   - Off of statin given shock liver, may restart when appropriate      Renal:  - JIMENEZ likely pre renal, improving HAGMA    - Monitor Cr, improving    - Hypernatremia today Na: 156  - Increase free water to 300 cc q6h  - Will start D5 @100 cc/hr   - Hypokalemia K: 3.1, repleted today  - Diurese PRN  - Strict I&Os  - Repeat CMP at 15:00, f/u results     ID:   - Fever overnight   - Rocephin changed to Zosyn yesterday as per ID recs   - 6/30 BCx x2 negative  - 7/2 BCx x2 NGTD  - 7/2 Sputum cx with Moraxella f/u sensitivities   - MRSA negative   - Monitor WBC and fever     Heme:   - C/w ASA/brilinta  - DVT PPX HSQ    Endo:  - DMT2 on ISS  - Given hyperglycemia, will continue insulin drip with q1h FS, BG improving   - Goal -180    Skin:   - Bryant  - RIJ central line     Dispo:  Critically ill requiring ICU level of care.    GOC discussion with family today

## 2022-07-06 NOTE — PROGRESS NOTE ADULT - SUBJECTIVE AND OBJECTIVE BOX
****** CHARTING IN PROGRESS *******    INTERVAL HPI/OVERNIGHT EVENTS: No acute overnight events     SUBJECTIVE: Patient seen and examined at bedside.     ROS: Unable to obtain as pt is intubated     OBJECTIVE:    VITAL SIGNS:  ICU Vital Signs Last 24 Hrs  T(C): 38.3 (06 Jul 2022 04:07), Max: 38.9 (05 Jul 2022 21:20)  T(F): 100.9 (06 Jul 2022 04:07), Max: 102.1 (05 Jul 2022 21:20)  HR: 87 (06 Jul 2022 06:00) (87 - 109)  BP: 111/65 (06 Jul 2022 06:00) (88/51 - 111/65)  BP(mean): 82 (06 Jul 2022 06:00) (64 - 82)  ABP: --  ABP(mean): --  RR: 28 (06 Jul 2022 06:00) (24 - 36)  SpO2: 99% (06 Jul 2022 06:00) (94% - 100%)    Mode: AC/ CMV (Assist Control/ Continuous Mandatory Ventilation), RR (machine): 22, TV (machine): 450, FiO2: 40, PEEP: 5, ITime: 1, MAP: 13, PIP: 30    07-05 @ 07:01  -  07-06 @ 07:00  --------------------------------------------------------  IN: 4998.2 mL / OUT: 2635 mL / NET: 2363.2 mL      CAPILLARY BLOOD GLUCOSE      POCT Blood Glucose.: 204 mg/dL (06 Jul 2022 06:52)      PHYSICAL EXAM:  General: NAD, sedated   HEENT: NCAT, PERRL, clear conjunctiva  Respiratory: diminished BS BL, clear to ascultation  Cardiovascular: RRR, normal S1S2, no M/R/G  Abdomen: soft, NT/ND, bowel sounds in all four quadrants, no palpable masses, obese  Extremities: no clubbing, cyanosis, or edema, BLE cool to touch     MEDICATIONS:  MEDICATIONS  (STANDING):  aspirin  chewable 81 milliGRAM(s) Oral daily  chlorhexidine 0.12% Liquid 15 milliLiter(s) Oral Mucosa every 12 hours  chlorhexidine 2% Cloths 1 Application(s) Topical <User Schedule>  dexMEDEtomidine Infusion 0.2 MICROgram(s)/kG/Hr (4.15 mL/Hr) IV Continuous <Continuous>  dextrose 5%. 1000 milliLiter(s) (50 mL/Hr) IV Continuous <Continuous>  dextrose 5%. 1000 milliLiter(s) (100 mL/Hr) IV Continuous <Continuous>  dextrose 50% Injectable 25 Gram(s) IV Push once  dextrose 50% Injectable 12.5 Gram(s) IV Push once  dextrose 50% Injectable 25 Gram(s) IV Push once  DOBUTamine Infusion 5 MICROgram(s)/kG/Min (12.5 mL/Hr) IV Continuous <Continuous>  glucagon  Injectable 1 milliGRAM(s) IntraMuscular once  heparin   Injectable 5000 Unit(s) SubCutaneous every 12 hours  insulin regular Infusion 8 Unit(s)/Hr (8 mL/Hr) IV Continuous <Continuous>  norepinephrine Infusion 0.05 MICROgram(s)/kG/Min (7.78 mL/Hr) IV Continuous <Continuous>  pantoprazole  Injectable 40 milliGRAM(s) IV Push daily  piperacillin/tazobactam IVPB.. 3.375 Gram(s) IV Intermittent every 8 hours  propofol Infusion 10 MICROgram(s)/kG/Min (4.98 mL/Hr) IV Continuous <Continuous>  ticagrelor 90 milliGRAM(s) Oral every 12 hours    MEDICATIONS  (PRN):  dextrose Oral Gel 15 Gram(s) Oral once PRN Blood Glucose LESS THAN 70 milliGRAM(s)/deciliter      ALLERGIES:  Allergies    No Known Allergies    Intolerances        LABS:                        12.5   13.06 )-----------( 228      ( 05 Jul 2022 06:34 )             42.0     07-05    157<H>  |  116<H>  |  81<H>  ----------------------------<  265<H>  3.4<L>   |  35<H>  |  2.10<H>    Ca    9.0      05 Jul 2022 16:05  Phos  1.5     07-05  Mg     2.6     07-05    TPro  7.2  /  Alb  2.9<L>  /  TBili  1.7<H>  /  DBili  x   /  AST  880<H>  /  ALT  3121<H>  /  AlkPhos  189<H>  07-05    PT/INR - ( 06 Jul 2022 06:27 )   PT: 14.8 sec;   INR: 1.26 ratio         PTT - ( 06 Jul 2022 06:27 )  PTT:30.1 sec      RADIOLOGY & ADDITIONAL TESTS: Reviewed. ***  BEDSIDE LUNG ULTRASOUND: ***  BEDSIDE ECHO: ***      CENTRAL LINE: RIJ  TLC      DATE INSERTED:               PETERSON: Y                       DATE INSERTED: 7/2                A-LINE: N                       DATE INSERTED:                    GLOBAL ISSUE/BEST PRACTICE  Analgesia: Fentanyl   Sedation: Propofol   HOB elevation: yes  Stress ulcer prophylaxis: Protonix   VTE prophylaxis: Heparin drip  Glycemic control: Y  Nutrition: Tube Feeds     CODE STATUS: Full Code ****** CHARTING IN PROGRESS *******    INTERVAL HPI/OVERNIGHT EVENTS: No acute overnight events     SUBJECTIVE: Patient seen and examined at bedside.     ROS: Unable to obtain as pt is intubated     OBJECTIVE:    VITAL SIGNS:  ICU Vital Signs Last 24 Hrs  T(C): 38.3 (06 Jul 2022 04:07), Max: 38.9 (05 Jul 2022 21:20)  T(F): 100.9 (06 Jul 2022 04:07), Max: 102.1 (05 Jul 2022 21:20)  HR: 87 (06 Jul 2022 06:00) (87 - 109)  BP: 111/65 (06 Jul 2022 06:00) (88/51 - 111/65)  BP(mean): 82 (06 Jul 2022 06:00) (64 - 82)  ABP: --  ABP(mean): --  RR: 28 (06 Jul 2022 06:00) (24 - 36)  SpO2: 99% (06 Jul 2022 06:00) (94% - 100%)    Mode: AC/ CMV (Assist Control/ Continuous Mandatory Ventilation), RR (machine): 22, TV (machine): 450, FiO2: 40, PEEP: 5, ITime: 1, MAP: 13, PIP: 30    07-05 @ 07:01  -  07-06 @ 07:00  --------------------------------------------------------  IN: 4998.2 mL / OUT: 2635 mL / NET: 2363.2 mL      CAPILLARY BLOOD GLUCOSE      POCT Blood Glucose.: 204 mg/dL (06 Jul 2022 06:52)      PHYSICAL EXAM:  General: NAD, sedated   HEENT: NCAT, PERRL, clear conjunctiva  Respiratory: diminished BS BL, clear to ascultation  Cardiovascular: RRR, normal S1S2, no M/R/G  Abdomen: soft, NT/ND, bowel sounds in all four quadrants, no palpable masses, obese  Extremities: no clubbing, cyanosis, or edema, BLE cool to touch     MEDICATIONS:  MEDICATIONS  (STANDING):  aspirin  chewable 81 milliGRAM(s) Oral daily  chlorhexidine 0.12% Liquid 15 milliLiter(s) Oral Mucosa every 12 hours  chlorhexidine 2% Cloths 1 Application(s) Topical <User Schedule>  dexMEDEtomidine Infusion 0.2 MICROgram(s)/kG/Hr (4.15 mL/Hr) IV Continuous <Continuous>  dextrose 5%. 1000 milliLiter(s) (50 mL/Hr) IV Continuous <Continuous>  dextrose 5%. 1000 milliLiter(s) (100 mL/Hr) IV Continuous <Continuous>  dextrose 50% Injectable 25 Gram(s) IV Push once  dextrose 50% Injectable 12.5 Gram(s) IV Push once  dextrose 50% Injectable 25 Gram(s) IV Push once  DOBUTamine Infusion 5 MICROgram(s)/kG/Min (12.5 mL/Hr) IV Continuous <Continuous>  glucagon  Injectable 1 milliGRAM(s) IntraMuscular once  heparin   Injectable 5000 Unit(s) SubCutaneous every 12 hours  insulin regular Infusion 8 Unit(s)/Hr (8 mL/Hr) IV Continuous <Continuous>  norepinephrine Infusion 0.05 MICROgram(s)/kG/Min (7.78 mL/Hr) IV Continuous <Continuous>  pantoprazole  Injectable 40 milliGRAM(s) IV Push daily  piperacillin/tazobactam IVPB.. 3.375 Gram(s) IV Intermittent every 8 hours  propofol Infusion 10 MICROgram(s)/kG/Min (4.98 mL/Hr) IV Continuous <Continuous>  ticagrelor 90 milliGRAM(s) Oral every 12 hours    MEDICATIONS  (PRN):  dextrose Oral Gel 15 Gram(s) Oral once PRN Blood Glucose LESS THAN 70 milliGRAM(s)/deciliter      ALLERGIES:  Allergies    No Known Allergies    Intolerances        LABS:                        12.5   13.06 )-----------( 228      ( 05 Jul 2022 06:34 )             42.0     07-05    157<H>  |  116<H>  |  81<H>  ----------------------------<  265<H>  3.4<L>   |  35<H>  |  2.10<H>    Ca    9.0      05 Jul 2022 16:05  Phos  1.5     07-05  Mg     2.6     07-05    TPro  7.2  /  Alb  2.9<L>  /  TBili  1.7<H>  /  DBili  x   /  AST  880<H>  /  ALT  3121<H>  /  AlkPhos  189<H>  07-05    PT/INR - ( 06 Jul 2022 06:27 )   PT: 14.8 sec;   INR: 1.26 ratio         PTT - ( 06 Jul 2022 06:27 )  PTT:30.1 sec      RADIOLOGY & ADDITIONAL TESTS: Reviewed.   BEDSIDE LUNG ULTRASOUND: ***  BEDSIDE ECHO: ***      CENTRAL LINE: RIJ  TLC      DATE INSERTED:               PETERSON: Y                       DATE INSERTED: 7/2                A-LINE: N                       DATE INSERTED:                    GLOBAL ISSUE/BEST PRACTICE  Analgesia: Fentanyl   Sedation: Propofol   HOB elevation: yes  Stress ulcer prophylaxis: Protonix   VTE prophylaxis: Heparin drip  Glycemic control: Y  Nutrition: Tube Feeds     CODE STATUS: Full Code ****** CHARTING IN PROGRESS *******    INTERVAL HPI/OVERNIGHT EVENTS: Febrile Tmax 102.1 F at 21:20. On Levophed and Dobutamine.     SUBJECTIVE: Patient seen and examined at bedside.     ROS: Unable to obtain as pt is intubated     OBJECTIVE:    VITAL SIGNS:  ICU Vital Signs Last 24 Hrs  T(C): 38.3 (06 Jul 2022 04:07), Max: 38.9 (05 Jul 2022 21:20)  T(F): 100.9 (06 Jul 2022 04:07), Max: 102.1 (05 Jul 2022 21:20)  HR: 87 (06 Jul 2022 06:00) (87 - 109)  BP: 111/65 (06 Jul 2022 06:00) (88/51 - 111/65)  BP(mean): 82 (06 Jul 2022 06:00) (64 - 82)  ABP: --  ABP(mean): --  RR: 28 (06 Jul 2022 06:00) (24 - 36)  SpO2: 99% (06 Jul 2022 06:00) (94% - 100%)    Mode: AC/ CMV (Assist Control/ Continuous Mandatory Ventilation), RR (machine): 22, TV (machine): 450, FiO2: 40, PEEP: 5, ITime: 1, MAP: 13, PIP: 30    07-05 @ 07:01  -  07-06 @ 07:00  --------------------------------------------------------  IN: 4998.2 mL / OUT: 2635 mL / NET: 2363.2 mL      CAPILLARY BLOOD GLUCOSE      POCT Blood Glucose.: 204 mg/dL (06 Jul 2022 06:52)      PHYSICAL EXAM:  General: NAD, sedated   HEENT: NCAT, PERRL, clear conjunctiva  Respiratory: diminished BS BL, clear to ascultation  Cardiovascular: RRR, normal S1S2, no M/R/G  Abdomen: soft, NT/ND, bowel sounds in all four quadrants, no palpable masses, obese  Extremities: no clubbing, cyanosis, or edema, BLE cool to touch     MEDICATIONS:  MEDICATIONS  (STANDING):  aspirin  chewable 81 milliGRAM(s) Oral daily  chlorhexidine 0.12% Liquid 15 milliLiter(s) Oral Mucosa every 12 hours  chlorhexidine 2% Cloths 1 Application(s) Topical <User Schedule>  dexMEDEtomidine Infusion 0.2 MICROgram(s)/kG/Hr (4.15 mL/Hr) IV Continuous <Continuous>  dextrose 5%. 1000 milliLiter(s) (50 mL/Hr) IV Continuous <Continuous>  dextrose 5%. 1000 milliLiter(s) (100 mL/Hr) IV Continuous <Continuous>  dextrose 50% Injectable 25 Gram(s) IV Push once  dextrose 50% Injectable 12.5 Gram(s) IV Push once  dextrose 50% Injectable 25 Gram(s) IV Push once  DOBUTamine Infusion 5 MICROgram(s)/kG/Min (12.5 mL/Hr) IV Continuous <Continuous>  glucagon  Injectable 1 milliGRAM(s) IntraMuscular once  heparin   Injectable 5000 Unit(s) SubCutaneous every 12 hours  insulin regular Infusion 8 Unit(s)/Hr (8 mL/Hr) IV Continuous <Continuous>  norepinephrine Infusion 0.05 MICROgram(s)/kG/Min (7.78 mL/Hr) IV Continuous <Continuous>  pantoprazole  Injectable 40 milliGRAM(s) IV Push daily  piperacillin/tazobactam IVPB.. 3.375 Gram(s) IV Intermittent every 8 hours  propofol Infusion 10 MICROgram(s)/kG/Min (4.98 mL/Hr) IV Continuous <Continuous>  ticagrelor 90 milliGRAM(s) Oral every 12 hours    MEDICATIONS  (PRN):  dextrose Oral Gel 15 Gram(s) Oral once PRN Blood Glucose LESS THAN 70 milliGRAM(s)/deciliter      ALLERGIES:  Allergies    No Known Allergies    Intolerances        LABS:                        12.5   13.06 )-----------( 228      ( 05 Jul 2022 06:34 )             42.0     07-05    157<H>  |  116<H>  |  81<H>  ----------------------------<  265<H>  3.4<L>   |  35<H>  |  2.10<H>    Ca    9.0      05 Jul 2022 16:05  Phos  1.5     07-05  Mg     2.6     07-05    TPro  7.2  /  Alb  2.9<L>  /  TBili  1.7<H>  /  DBili  x   /  AST  880<H>  /  ALT  3121<H>  /  AlkPhos  189<H>  07-05    PT/INR - ( 06 Jul 2022 06:27 )   PT: 14.8 sec;   INR: 1.26 ratio         PTT - ( 06 Jul 2022 06:27 )  PTT:30.1 sec      RADIOLOGY & ADDITIONAL TESTS: Reviewed.   BEDSIDE LUNG ULTRASOUND: ***  BEDSIDE ECHO: ***      CENTRAL LINE: RIJ  TLC      DATE INSERTED:               PETERSON: Y                       DATE INSERTED: 7/2                A-LINE: N                       DATE INSERTED:                    GLOBAL ISSUE/BEST PRACTICE  Analgesia: Fentanyl   Sedation: Propofol   HOB elevation: yes  Stress ulcer prophylaxis: Protonix   VTE prophylaxis: Heparin drip  Glycemic control: Y  Nutrition: Tube Feeds     CODE STATUS: Full Code INTERVAL HPI/OVERNIGHT EVENTS: Febrile Tmax 102.1 F at 21:20. On Levophed and Dobutamine.     SUBJECTIVE: Patient seen and examined at bedside.     ROS: Unable to obtain as pt is intubated     OBJECTIVE:    VITAL SIGNS:  ICU Vital Signs Last 24 Hrs  T(C): 38.3 (06 Jul 2022 04:07), Max: 38.9 (05 Jul 2022 21:20)  T(F): 100.9 (06 Jul 2022 04:07), Max: 102.1 (05 Jul 2022 21:20)  HR: 87 (06 Jul 2022 06:00) (87 - 109)  BP: 111/65 (06 Jul 2022 06:00) (88/51 - 111/65)  BP(mean): 82 (06 Jul 2022 06:00) (64 - 82)  ABP: --  ABP(mean): --  RR: 28 (06 Jul 2022 06:00) (24 - 36)  SpO2: 99% (06 Jul 2022 06:00) (94% - 100%)    Mode: AC/ CMV (Assist Control/ Continuous Mandatory Ventilation), RR (machine): 22, TV (machine): 450, FiO2: 40, PEEP: 5, ITime: 1, MAP: 13, PIP: 30    07-05 @ 07:01  -  07-06 @ 07:00  --------------------------------------------------------  IN: 4998.2 mL / OUT: 2635 mL / NET: 2363.2 mL      CAPILLARY BLOOD GLUCOSE      POCT Blood Glucose.: 204 mg/dL (06 Jul 2022 06:52)      PHYSICAL EXAM:  General: NAD, sedated   HEENT: NCAT, PERRL, clear conjunctiva  Respiratory: diminished BS BL  Cardiovascular: RRR, normal S1S2, no M/R/G  Abdomen: soft, NT/ND, bowel sounds in all four quadrants, no palpable masses, obese  Extremities: no clubbing, cyanosis, or edema, BLE cool to touch     MEDICATIONS:  MEDICATIONS  (STANDING):  aspirin  chewable 81 milliGRAM(s) Oral daily  chlorhexidine 0.12% Liquid 15 milliLiter(s) Oral Mucosa every 12 hours  chlorhexidine 2% Cloths 1 Application(s) Topical <User Schedule>  dexMEDEtomidine Infusion 0.2 MICROgram(s)/kG/Hr (4.15 mL/Hr) IV Continuous <Continuous>  dextrose 5%. 1000 milliLiter(s) (50 mL/Hr) IV Continuous <Continuous>  dextrose 5%. 1000 milliLiter(s) (100 mL/Hr) IV Continuous <Continuous>  dextrose 50% Injectable 25 Gram(s) IV Push once  dextrose 50% Injectable 12.5 Gram(s) IV Push once  dextrose 50% Injectable 25 Gram(s) IV Push once  DOBUTamine Infusion 5 MICROgram(s)/kG/Min (12.5 mL/Hr) IV Continuous <Continuous>  glucagon  Injectable 1 milliGRAM(s) IntraMuscular once  heparin   Injectable 5000 Unit(s) SubCutaneous every 12 hours  insulin regular Infusion 8 Unit(s)/Hr (8 mL/Hr) IV Continuous <Continuous>  norepinephrine Infusion 0.05 MICROgram(s)/kG/Min (7.78 mL/Hr) IV Continuous <Continuous>  pantoprazole  Injectable 40 milliGRAM(s) IV Push daily  piperacillin/tazobactam IVPB.. 3.375 Gram(s) IV Intermittent every 8 hours  propofol Infusion 10 MICROgram(s)/kG/Min (4.98 mL/Hr) IV Continuous <Continuous>  ticagrelor 90 milliGRAM(s) Oral every 12 hours    MEDICATIONS  (PRN):  dextrose Oral Gel 15 Gram(s) Oral once PRN Blood Glucose LESS THAN 70 milliGRAM(s)/deciliter      ALLERGIES:  Allergies    No Known Allergies    Intolerances        LABS:                        12.5   13.06 )-----------( 228      ( 05 Jul 2022 06:34 )             42.0     07-05    157<H>  |  116<H>  |  81<H>  ----------------------------<  265<H>  3.4<L>   |  35<H>  |  2.10<H>    Ca    9.0      05 Jul 2022 16:05  Phos  1.5     07-05  Mg     2.6     07-05    TPro  7.2  /  Alb  2.9<L>  /  TBili  1.7<H>  /  DBili  x   /  AST  880<H>  /  ALT  3121<H>  /  AlkPhos  189<H>  07-05    PT/INR - ( 06 Jul 2022 06:27 )   PT: 14.8 sec;   INR: 1.26 ratio         PTT - ( 06 Jul 2022 06:27 )  PTT:30.1 sec      RADIOLOGY & ADDITIONAL TESTS: Reviewed.   BEDSIDE LUNG ULTRASOUND: A lines predominantly on right, B lines left base   BEDSIDE ECHO: No pericardial effusion, VTI: 8.3 cm       CENTRAL LINE: RIJ  TLC      DATE INSERTED:               PETERSON: Y                       DATE INSERTED: 7/2                A-LINE: N                       DATE INSERTED:                    GLOBAL ISSUE/BEST PRACTICE  Analgesia: N   Sedation: Propofol   HOB elevation: yes  Stress ulcer prophylaxis: Protonix   VTE prophylaxis: Heparin drip  Glycemic control: Y  Nutrition: NPO with Tube Feeds     CODE STATUS: Full Code

## 2022-07-06 NOTE — GOALS OF CARE CONVERSATION - ADVANCED CARE PLANNING - CONVERSATION DETAILS
Had meeting with patient's wife and three sons. Decision made to make patient DNR. Plan for compassionate extubation tomorrow, comfort measures. Will continue current management until then, including ventilatory support, vasopressor and inotropic support.

## 2022-07-06 NOTE — PROGRESS NOTE ADULT - SUBJECTIVE AND OBJECTIVE BOX
Patient is a 77y old  Male who presents with a chief complaint of Substernal chest pain since last night (06 Jul 2022 17:07)      INTERVAL HPI/OVERNIGHT EVENTS:    remains intubated and sedated on respirator    MEDICATIONS  (STANDING):  aspirin  chewable 81 milliGRAM(s) Oral daily  chlorhexidine 0.12% Liquid 15 milliLiter(s) Oral Mucosa every 12 hours  chlorhexidine 2% Cloths 1 Application(s) Topical <User Schedule>  dextrose 5%. 1000 milliLiter(s) (100 mL/Hr) IV Continuous <Continuous>  dextrose 5%. 1000 milliLiter(s) (50 mL/Hr) IV Continuous <Continuous>  dextrose 5%. 1000 milliLiter(s) (100 mL/Hr) IV Continuous <Continuous>  dextrose 50% Injectable 25 Gram(s) IV Push once  dextrose 50% Injectable 12.5 Gram(s) IV Push once  dextrose 50% Injectable 25 Gram(s) IV Push once  DOBUTamine Infusion 5 MICROgram(s)/kG/Min (12.5 mL/Hr) IV Continuous <Continuous>  glucagon  Injectable 1 milliGRAM(s) IntraMuscular once  heparin   Injectable 5000 Unit(s) SubCutaneous every 12 hours  insulin regular Infusion 12 Unit(s)/Hr (12 mL/Hr) IV Continuous <Continuous>  norepinephrine Infusion 0.05 MICROgram(s)/kG/Min (7.78 mL/Hr) IV Continuous <Continuous>  pantoprazole  Injectable 40 milliGRAM(s) IV Push daily  piperacillin/tazobactam IVPB.. 3.375 Gram(s) IV Intermittent every 8 hours  propofol Infusion 10 MICROgram(s)/kG/Min (4.98 mL/Hr) IV Continuous <Continuous>  ticagrelor 90 milliGRAM(s) Oral every 12 hours      MEDICATIONS  (PRN):  dextrose Oral Gel 15 Gram(s) Oral once PRN Blood Glucose LESS THAN 70 milliGRAM(s)/deciliter      Allergies    No Known Allergies    Intolerances        PAST MEDICAL & SURGICAL HISTORY:  Lung cancer, upper lobe, left      Heart attack  1990,2005      Stented coronary artery  six cardiac stents      Sleep apnea  uses c pap at home      HTN (hypertension)      High cholesterol      Diabetes  on metformin, humalog insulin pump      Insulin pump in place      Hiatal hernia      Vertigo      Stented coronary artery  Drug eluding x 6 stents      Type 2 diabetes mellitus      TIA (transient ischemic attack)  Jan 2014      Chronic back pain      Spinal stenosis in cervical region      Spinal stenosis of lumbar region      Lumbar herniated disc      OA (osteoarthritis)      CHF (congestive heart failure)      Cellulitis      S/P partial lobectomy of lung  lobectomy of left upper lobe. April 2014      History of throat surgery  1996      S/P angioplasty with stent  2005 and 2006          Vital Signs Last 24 Hrs  T(C): 37.8 (06 Jul 2022 20:56), Max: 38.7 (05 Jul 2022 23:23)  T(F): 100.1 (06 Jul 2022 20:56), Max: 101.7 (05 Jul 2022 23:23)  HR: 100 (06 Jul 2022 22:00) (59 - 120)  BP: 91/54 (06 Jul 2022 22:00) (81/60 - 116/69)  BP(mean): 67 (06 Jul 2022 22:00) (64 - 88)  RR: 33 (06 Jul 2022 22:00) (24 - 61)  SpO2: 97% (06 Jul 2022 22:00) (92% - 100%)    PHYSICAL EXAMINATION:    GENERAL: The patient is intubated and sedated.     HEENT: Head is normocephalic and atraumatic    NECK: No JVD    LUNGS: Bilateral coarse rhonchi    HEART: Regular rate and rhythm without murmur.    ABDOMEN: Soft, nontender, and nondistended.      EXTREMITIES: bilateral pedal edema    NEUROLOGIC: no focal findings    SKIN: No ulceration or induration present.      LABS:                        11.3   14.35 )-----------( 195      ( 06 Jul 2022 06:27 )             38.4     07-06    159<H>  |  121<H>  |  72<H>  ----------------------------<  204<H>  4.4   |  35<H>  |  1.80<H>    Ca    8.6      06 Jul 2022 15:25  Phos  2.8     07-06  Mg     2.5     07-06    TPro  6.9  /  Alb  2.6<L>  /  TBili  1.4<H>  /  DBili  x   /  AST  229<H>  /  ALT  1644<H>  /  AlkPhos  176<H>  07-06    PT/INR - ( 06 Jul 2022 06:27 )   PT: 14.8 sec;   INR: 1.26 ratio         PTT - ( 06 Jul 2022 06:27 )  PTT:30.1 sec              Lactate, Blood: 1.7 mmol/L (07-06-22 @ 06:27)          Assessment:    Acute on Chronic Hypoxic respiratory failure  S/P Cardiogenic Shock  Sepsis  Acute Kidney Injury  Hypernatremia  Coronary artery disease - S/P stents  Diabetes  Hx Left upper lobectomy for stage 1 non-small cell carcinoma of lung    Plan:    Continue mechanical ventilatory support  Taper pressors as tolerated  Sedation as needed  IV Zosyn  Prognosis remains guarded

## 2022-07-06 NOTE — PROGRESS NOTE ADULT - SUBJECTIVE AND OBJECTIVE BOX
Patient is a 77y old  Male who presents with a chief complaint of Substernal chest pain since last night (05 Jul 2022 07:31)    Patient seen in follow up for JIMENEZ.        PAST MEDICAL HISTORY:  Lung cancer, upper lobe, left    Heart attack    Stented coronary artery    Sleep apnea    HTN (hypertension)    High cholesterol    Diabetes    Insulin pump in place    Hiatal hernia    Vertigo    Stented coronary artery    Type 2 diabetes mellitus    TIA (transient ischemic attack)    Chronic back pain    Spinal stenosis in cervical region    Spinal stenosis of lumbar region    Lumbar herniated disc    OA (osteoarthritis)    CHF (congestive heart failure)    Cellulitis        MEDICATIONS  (STANDING):  aspirin  chewable 81 milliGRAM(s) Oral daily  chlorhexidine 0.12% Liquid 15 milliLiter(s) Oral Mucosa every 12 hours  chlorhexidine 2% Cloths 1 Application(s) Topical <User Schedule>  dextrose 5%. 1000 milliLiter(s) (100 mL/Hr) IV Continuous <Continuous>  dextrose 5%. 1000 milliLiter(s) (50 mL/Hr) IV Continuous <Continuous>  dextrose 5%. 1000 milliLiter(s) (100 mL/Hr) IV Continuous <Continuous>  dextrose 50% Injectable 25 Gram(s) IV Push once  dextrose 50% Injectable 12.5 Gram(s) IV Push once  dextrose 50% Injectable 25 Gram(s) IV Push once  DOBUTamine Infusion 5 MICROgram(s)/kG/Min (12.5 mL/Hr) IV Continuous <Continuous>  glucagon  Injectable 1 milliGRAM(s) IntraMuscular once  heparin   Injectable 5000 Unit(s) SubCutaneous every 12 hours  insulin regular Infusion 12 Unit(s)/Hr (12 mL/Hr) IV Continuous <Continuous>  norepinephrine Infusion 0.05 MICROgram(s)/kG/Min (7.78 mL/Hr) IV Continuous <Continuous>  pantoprazole  Injectable 40 milliGRAM(s) IV Push daily  piperacillin/tazobactam IVPB.. 3.375 Gram(s) IV Intermittent every 8 hours  potassium chloride   Powder 40 milliEquivalent(s) Oral every 4 hours  propofol Infusion 10 MICROgram(s)/kG/Min (4.98 mL/Hr) IV Continuous <Continuous>  ticagrelor 90 milliGRAM(s) Oral every 12 hours    MEDICATIONS  (PRN):  dextrose Oral Gel 15 Gram(s) Oral once PRN Blood Glucose LESS THAN 70 milliGRAM(s)/deciliter    T(C): 37.8 (07-06-22 @ 08:45), Max: 39.3 (07-04-22 @ 23:47)  HR: 98 (07-06-22 @ 11:00) (73 - 120)  BP: 109/68 (07-06-22 @ 11:00) (81/60 - 194/94)  RR: 41 (07-06-22 @ 11:00)  SpO2: 100% (07-06-22 @ 11:00)  Wt(kg): --  I&O's Detail    05 Jul 2022 07:01  -  06 Jul 2022 07:00  --------------------------------------------------------  IN:    DOBUTamine: 300 mL    Enteral Tube Flush: 100 mL    Free Water: 1000 mL    Insulin: 8 mL    Insulin: 6 mL    Insulin: 196 mL    Insulin: 8 mL    IV PiggyBack: 499.8 mL    IV PiggyBack: 50 mL    IV PiggyBack: 300 mL    IV PiggyBack: 300 mL    Nepro with Carb Steady: 960 mL    Norepinephrine: 970.4 mL    Propofol: 300 mL  Total IN: 4998.2 mL    OUT:    Indwelling Catheter - Urethral (mL): 2675 mL  Total OUT: 2675 mL    Total NET: 2323.2 mL      06 Jul 2022 07:01  -  06 Jul 2022 11:17  --------------------------------------------------------  IN:    DOBUTamine: 25 mL    Insulin: 16 mL    Nepro with Carb Steady: 80 mL    Norepinephrine: 81 mL    Propofol: 25 mL  Total IN: 227 mL    OUT:    Indwelling Catheter - Urethral (mL): 85 mL  Total OUT: 85 mL    Total NET: 142 mL            PHYSICAL EXAM:  General: on vent  Respiratory: b/l air entry  Cardiovascular: S1 S2  Gastrointestinal: soft  Extremities:  edema    Mode: AC/ CMV (Assist Control/ Continuous Mandatory Ventilation), RR (machine): 22, TV (machine): 460, FiO2: 30, PEEP: 5, ITime: 1, MAP: 12, PIP: 26  LABORATORY:                        11.3   14.35 )-----------( 195      ( 06 Jul 2022 06:27 )             38.4     07-06    156<H>  |  116<H>  |  79<H>  ----------------------------<  242<H>  3.1<L>   |  34<H>  |  1.80<H>    Ca    8.5      06 Jul 2022 06:27  Phos  2.8     07-06  Mg     2.5     07-06    TPro  6.6  /  Alb  2.4<L>  /  TBili  1.4<H>  /  DBili  x   /  AST  285<H>  /  ALT  1788<H>  /  AlkPhos  166<H>  07-06    Sodium, Serum: 156 mmol/L (07-06 @ 06:27)  Sodium, Serum: 157 mmol/L (07-05 @ 16:05)  Sodium, Serum: 154 mmol/L (07-05 @ 06:34)    Potassium, Serum: 3.1 mmol/L (07-06 @ 06:27)  Potassium, Serum: 3.4 mmol/L (07-05 @ 16:05)  Potassium, Serum: 3.1 mmol/L (07-05 @ 06:34)    Hemoglobin: 11.3 g/dL (07-06 @ 06:27)  Hemoglobin: 12.5 g/dL (07-05 @ 06:34)  Hemoglobin: 12.4 g/dL (07-04 @ 04:55)    Creatinine, Serum 1.80 (07-06 @ 06:27)  Creatinine, Serum 2.10 (07-05 @ 16:05)  Creatinine, Serum 2.00 (07-05 @ 06:34)  Creatinine, Serum 1.90 (07-04 @ 04:55)        LIVER FUNCTIONS - ( 06 Jul 2022 06:27 )  Alb: 2.4 g/dL / Pro: 6.6 g/dL / ALK PHOS: 166 U/L / ALT: 1788 U/L / AST: 285 U/L / GGT: x

## 2022-07-06 NOTE — PROGRESS NOTE ADULT - ASSESSMENT
The patient is a 77 year old male with a history of DM, CAD with prior MI s/p multiple PCI, chronic systolic heart failure s/p CRT-D who presents with chest pain and shortness of breath in the setting of acute systolic heart failure, cardiogenic shock, possible NSTEMI.    Plan:  - The patient has a history of multiple PCI including intervention on ISR and is not a candidate for CABG or future PCI  - Troponin peaked at 1360 in the setting of possible NSTEMI vs. heart failure/shock. No need to trend.  - Echo with severely reduced LV systolic function  - Continue aspirin 81 mg daily  - Continue ticagrelor 90 mg bid  - Completed heparin drip for medical management of NSTEMI  - Hold Entresto, spironolactone, carvedilol due to hypotension/shock  - Continue norepinephrine and wean off as tolerated  - Continue dobutamine 5 mcg/kg/min  - Consider lowering bumetanide due to worsening kidney function and hypernatremia  - The patient is not a candidate for advanced heart failure therapies  - Doubt fevers are cardiac in nature - ID follow-up  - Renal and liver function improving  - ICU care

## 2022-07-06 NOTE — PROGRESS NOTE ADULT - SUBJECTIVE AND OBJECTIVE BOX
Chief Complaint: Chest pain, shortness of breath    Interval Events: No events overnight.    Review of Systems:  Unable to obtain    Physical Exam:  Vital Signs Last 24 Hrs  T(C): 37.8 (06 Jul 2022 08:45), Max: 38.9 (05 Jul 2022 21:20)  T(F): 100 (06 Jul 2022 08:45), Max: 102.1 (05 Jul 2022 21:20)  HR: 90 (06 Jul 2022 09:00) (87 - 109)  BP: 105/58 (06 Jul 2022 09:00) (81/60 - 111/65)  BP(mean): 76 (06 Jul 2022 09:00) (64 - 82)  RR: 30 (06 Jul 2022 09:00) (24 - 36)  SpO2: 98% (06 Jul 2022 09:00) (93% - 100%)  General: Sedated  HEENT: Intubated  Neck: No JVD, no carotid bruit  Lungs: Coarse bilaterally  CV: RRR, nl S1/S2, no M/R/G  Abdomen: S/NT/ND, +BS  Extremities: 1+ LE edema, no cyanosis  Neuro: AAOx0  Skin: No rash    Labs:    07-06    156<H>  |  116<H>  |  79<H>  ----------------------------<  242<H>  3.1<L>   |  34<H>  |  1.80<H>    Ca    8.5      06 Jul 2022 06:27  Phos  2.8     07-06  Mg     2.5     07-06    TPro  6.6  /  Alb  2.4<L>  /  TBili  1.4<H>  /  DBili  x   /  AST  285<H>  /  ALT  1788<H>  /  AlkPhos  166<H>  07-06                        11.3   14.35 )-----------( 195      ( 06 Jul 2022 06:27 )             38.4       Telemetry: Ventricular paced

## 2022-07-07 NOTE — DIETITIAN INITIAL EVALUATION ADULT - OTHER INFO
77M significant PMH CAD s/p MEL x 6 (most recent MI 2 mo ago), HFrEF, BiV-ICD, chronic back pain, DM2 on metformin, Hiatal hernia, HLD, HTN, Lung CA, and PAPO originally a/w NSTEMI with cardiogenic shock component and downgraded.  Now return to ICU for ADHF, Cardiogenic shock, acute hypoxemic RF 2/2 cardiogenic pulmonary edema, worsening JIMENEZ and transaminitis. Now with component of septic shock.     Pt intubated/sedated at time of visit. Requiring levophed, dobutamine. On propofol for sedation; current rate 12.45cc/hr (provides ~300kcal, 33gm fat). Receiving OGT feeding of Nepro @ current rate 40cc/hr x 24hrs (provides 1700kcal, 78gm protein). Well tolerated per RN. Hyperglycemia ongoing, on insulin drip. HgbA1c 8.6%. Per [facility], plan for compassionate extubation today 7/7, comfort measures only. Will remain available prn.

## 2022-07-07 NOTE — DIETITIAN INITIAL EVALUATION ADULT - HEIGHT FOR BMI (FEET)
Progress Notes by Gia Adams NCMA at 04/20/18 02:48 PM     Author:  Gia Adams NCMA Service:  (none) Author Type:  Certified Medical Assistant     Filed:  04/20/18 02:48 PM Encounter Date:  4/19/2018 Status:  Signed     :  Gia Adams NCMA (Certified Medical Assistant)            Patient notified of results.  Patient started on Cipro 500 mg BID for 3 weeks.[CF1.1M]    Revision History        User Key Date/Time User Provider Type Action    > CF1.1 04/20/18 02:48 PM Gia Adams NCMA Certified Medical Assistant Sign    M - Manual             5

## 2022-07-07 NOTE — DIETITIAN INITIAL EVALUATION ADULT - NS FNS DIET ORDER
Diet, NPO with Tube Feed:   Tube Feeding Modality: Orogastric  Nepro with Carb Steady  Total Volume for 24 Hours (mL): 960  Continuous  Starting Tube Feed Rate {mL per Hour}: 10  Increase Tube Feed Rate by (mL): 10     Every 6 hours  Until Goal Tube Feed Rate (mL per Hour): 40  Tube Feed Duration (in Hours): 24  Tube Feed Start Time: 10:00 (07-02-22 @ 09:28) [Active]

## 2022-07-07 NOTE — PROGRESS NOTE ADULT - SUBJECTIVE AND OBJECTIVE BOX
Chief Complaint: Chest pain, shortness of breath    Interval Events: No events overnight.    Review of Systems:  Unable to obtain    Physical Exam:  Vital Signs Last 24 Hrs  T(C): 37.1 (07 Jul 2022 08:10), Max: 38.4 (06 Jul 2022 16:21)  T(F): 98.8 (07 Jul 2022 08:10), Max: 101.1 (06 Jul 2022 16:21)  HR: 80 (07 Jul 2022 09:00) (59 - 120)  BP: 100/60 (07 Jul 2022 09:00) (91/54 - 116/69)  BP(mean): 73 (07 Jul 2022 09:00) (66 - 88)  RR: 30 (07 Jul 2022 09:00) (27 - 61)  SpO2: 100% (07 Jul 2022 09:00) (92% - 100%)  General: Sedated  HEENT: Intubated  Neck: No JVD, no carotid bruit  Lungs: Coarse bilaterally  CV: RRR, nl S1/S2, no M/R/G  Abdomen: S/NT/ND, +BS  Extremities: 1+ LE edema, no cyanosis  Neuro: AAOx0  Skin: No rash    Labs:    07-07    150<H>  |  113<H>  |  76<H>  ----------------------------<  248<H>  4.0   |  30  |  1.70<H>    Ca    8.1<L>      07 Jul 2022 05:52  Phos  2.8     07-07  Mg     2.5     07-07    TPro  5.8<L>  /  Alb  2.2<L>  /  TBili  1.3<H>  /  DBili  x   /  AST  141<H>  /  ALT  998<H>  /  AlkPhos  156<H>  07-07                        10.0   14.52 )-----------( 151      ( 07 Jul 2022 05:52 )             34.1       Telemetry: Ventricular paced

## 2022-07-07 NOTE — DIETITIAN INITIAL EVALUATION ADULT - NSFNSGIIOFT_GEN_A_CORE
07-06-22 @ 07:01  -  07-07-22 @ 07:00  --------------------------------------------------------  OUT:  Total OUT: 0 mL    Total NET: 960 mL

## 2022-07-07 NOTE — PROGRESS NOTE ADULT - ASSESSMENT
JIMENEZ on CKD 3  CHF  Shock on pressors  h/o Hypertension  Diabetes  Hypernatremia    Stable renal indices. Improving sodium levels. On free water with tube feeds. Monitor blood sugar levels. Insulin coverage as needed. Will follow electrolytes and renal function trend. Avoid nephrotoxic meds as possible.

## 2022-07-07 NOTE — PROGRESS NOTE ADULT - ASSESSMENT
77M significant PMH CAD s/p MEL x 6 (most recent MI 2 mo ago), HFrEF, BiV-ICD, chronic back pain, DM2 on metformin, Hiatal hernia, HLD, HTN, Lung CA, and PAPO originally a/w NSTEMI with cardiogenic shock component and downgraded.  Now return to ICU for ADHF, Cardiogenic shock, acute hypoxemic RF 2/2 cardiogenic pulmonary edema, worsening JIMENEZ and transaminitis. Now with component of septic shock.     Neuro:   - Sedated on Propofol, will wean as tolerated      CV:   - ADHF  - Cardiogenic shock has resolved  - Continue Levophed and Dobutamine   - BNP 20650, will diurese PRN   - Continue ASA and Brilinta  - Trop: 1071.8-->1740.6-->1573, no need to further trend as trop has peaked    - TTE: severe reduced LV systolic function, EF:30%, increase LV wall thickness, mild pulmonary HTN  - Off of statin given shock liver, may restart when appropriate      Resp:   - Acute hypoxemic respiratory failure 2/2 cardiogenic pulm edema  - Continue lung protective ventilation AC/CMV: 22/450/5/40, no changes in vent settings today   - CXR 7/2: Airspace opacities slightly improved on the left and stable on the right. May be due to pulmonary edema vs underlying infection/inflammation   - Repeat CXR 7/5: RUL clearing   - Aggressive chest PT and suctioning  - Plan for terminal extubation today     GI:   - NPO with TF  - Shock liver, transaminitis improving, will continue to trend   - Off of statin given shock liver, may restart when appropriate      Renal:  - JIMENEZ likely pre renal, improving HAGMA    - Monitor Cr, improving    - Hypernatremia today Na:150  - Continue free water to 300 q6h  - Continue D5 @100 cc/hr   - Diurese PRN  - Strict I&Os    ID:   - Fever overnight   - Continue Zosyn as per ID recs   - 6/30 BCx x2 negative  - 7/2 BCx x2 NGTD  - 7/2 Sputum cx with Moraxella f/u sensitivities   - MRSA negative   - Monitor WBC and fever     Heme:   - C/w ASA/brilinta  - DVT PPX HSQ    Endo:  - DMT2 on ISS  - Given hyperglycemia, will continue insulin drip with q1h FS, BG improving   - Goal -180    Skin:   - Bryant  - RIJ central line     Dispo:  Critically ill requiring ICU level of care.    Pt is now DNR, plan for terminal extubation today  77M significant PMH CAD s/p MEL x 6 (most recent MI 2 mo ago), HFrEF, BiV-ICD, chronic back pain, DM2 on metformin, Hiatal hernia, HLD, HTN, Lung CA, and PAPO originally a/w NSTEMI with cardiogenic shock component and downgraded.  Now return to ICU for ADHF, Cardiogenic shock, acute hypoxemic RF 2/2 cardiogenic pulmonary edema, worsening JIMENEZ and transaminitis. Now with component of septic shock.     Neuro:   - Sedated on Propofol, will wean as tolerated  \    CV:   - ADHF  - Cardiogenic shock has resolved  - Continue Levophed and Dobutamine   - BNP 09504, will diurese PRN   - Continue ASA and Brilinta  - Trop: 1071.8-->1740.6-->1573, no need to further trend as trop has peaked    - TTE: severe reduced LV systolic function, EF:30%, increase LV wall thickness, mild pulmonary HTN  - Off of statin given shock liver, may restart when appropriate      Resp:   - Acute hypoxemic respiratory failure 2/2 cardiogenic pulm edema  - Continue lung protective ventilation AC/CMV: 22/450/5/30  - CXR 7/2: Airspace opacities slightly improved on the left and stable on the right. May be due to pulmonary edema vs underlying infection/inflammation   - Repeat CXR 7/5: RUL clearing   - Aggressive chest PT and suctioning  - Plan for terminal extubation today     GI:   - NPO with TF  - Shock liver, transaminitis improving, will continue to trend   - Off of statin given shock liver, may restart when appropriate      Renal:  - JIMENEZ likely pre renal  - Monitor Cr, improving    - Hypernatremia today Na: 150  - Continue free water 300 q6h  - Continue D5 @100 cc/hr   - Diurese PRN  - Strict I&Os    ID:   - Fever overnight   - Continue Zosyn as per ID recs   - 6/30 BCx x2 negative  - 7/2 BCx x2 NGTD  - 7/2 Sputum cx with Moraxella f/u sensitivities   - MRSA negative   - Monitor WBC and fever     Heme:   - C/w ASA/brilinta  - DVT PPX HSQ    Endo:  - DMT2 on ISS  - Given hyperglycemia, will continue insulin drip with q1h FS, BG improving   - Goal -180    Skin:   - Bryant  - RIJ central line     Dispo:  Critically ill requiring ICU level of care.    Pt is now DNR, plan for terminal extubation today  77M significant PMH CAD s/p MEL x 6 (most recent MI 2 mo ago), HFrEF, BiV-ICD, chronic back pain, DM2 on metformin, Hiatal hernia, HLD, HTN, Lung CA, and PAPO originally a/w NSTEMI with cardiogenic shock component and downgraded.  Now return to ICU for ADHF, Cardiogenic shock, acute hypoxemic RF 2/2 cardiogenic pulmonary edema, worsening JIMENEZ and transaminitis. Now with component of septic shock.   Plan for terminal extubation today.     Neuro:   - Sedated on Propofol, will wean as tolerated   - Plan for terminal extubation today    CV:   - ADHF  - Cardiogenic shock has resolved  - Continue Levophed and Dobutamine for now   - On ASA and Brilinta for CAD, will discontinue as pt will be terminally extubated today   - TTE: severe reduced LV systolic function, EF:30%, increase LV wall thickness, mild pulmonary HTN  - Off of statin given shock liver       Resp:   - Acute hypoxemic respiratory failure 2/2 cardiogenic pulm edema  - Continue lung protective ventilation AC/CMV: 22/450/5/30  - CXR 7/2: Airspace opacities slightly improved on the left and stable on the right. May be due to pulmonary edema vs underlying infection/inflammation, repeat CXR 7/5: RUL clearing   - Chest PT and suctioning  - Plan for terminal extubation today, start Dilaudid drip, Dilaudid 1 mg q1h PRN for dyspnea, Valium 2.5 mg IVP x1 and Glycopyrrolate .2 mg q8h for secretions     GI:   - NPO with TF  - Shock liver, transaminitis improving  - Off of statin given shock liver      Renal:  - JIMENEZ likely pre renal  - Monitor Cr, improving    - Hypernatremia today Na: 150  - Continue free water 300 cc q6h  - Continue D5 @100 cc/hr     ID:   - Fever overnight   - On Zosyn, will discontinue as pt will be terminally extubated today   - 6/30 BCx x2 negative  - 7/2 BCx x2 NGTD  - 7/2 Sputum cx with Moraxella f/u sensitivities   - MRSA negative   - Monitor WBC and fever     Heme:   - On ASA and Brilinta, will discontinue as pt will be terminally extubated today   - DVT PPX HSQ, will discontinue as pt will be terminally extubated today     Endo:  - DMT2 on ISS  - Given hyperglycemia, on insulin drip with q1h FS, BG improving, will discontinue as pt will be terminally extubated today    - Goal -180    Skin:   - Bryant  - RIJ central line     Dispo:  Critically ill requiring ICU level of care.    Pt is now DNR, plan for terminal extubation today around noon 77M significant PMH CAD s/p MEL x 6 (most recent MI 2 mo ago), HFrEF, BiV-ICD, chronic back pain, DM2 on metformin, Hiatal hernia, HLD, HTN, Lung CA, and PAPO originally a/w NSTEMI with cardiogenic shock component and downgraded.  Now return to ICU for ADHF, Cardiogenic shock, acute hypoxemic RF 2/2 cardiogenic pulmonary edema, worsening JIMENEZ and transaminitis. Now with component of septic shock.   Plan for terminal extubation today.     Neuro:   - Sedated on Propofol, will wean as tolerated   - Plan for terminal extubation today    CV:   - ADHF  - Cardiogenic shock has resolved  - Will discontinue Levophed and Dobutamine  - On ASA and Brilinta for CAD, will discontinue as pt will be terminally extubated today   - TTE: severe reduced LV systolic function, EF:30%, increase LV wall thickness, mild pulmonary HTN  - Off of statin given shock liver       Resp:   - Acute hypoxemic respiratory failure 2/2 cardiogenic pulm edema  - Continue lung protective ventilation AC/CMV: 22/450/5/30  - CXR 7/2: Airspace opacities slightly improved on the left and stable on the right. May be due to pulmonary edema vs underlying infection/inflammation, repeat CXR 7/5: RUL clearing   - Chest PT and suctioning  - Plan for terminal extubation today, start Dilaudid drip, Dilaudid 1 mg q1h PRN for dyspnea, Valium 2.5 mg IVP x1 and Glycopyrrolate .2 mg q8h for secretions     GI:   - NPO with TF  - Shock liver, transaminitis improving  - Off of statin given shock liver      Renal:  - JIMENEZ likely pre renal  - Free water 300 cc q6h and D5 @100 cc/hr, will discontinue as pt will be terminally extubated today      ID:   - Fever overnight   - On Zosyn, will discontinue as pt will be terminally extubated today   - 6/30 BCx x2 negative  - 7/2 BCx x2 NGTD  - 7/2 Sputum cx with Moraxella   - MRSA negative     Heme:   - On ASA and Brilinta, will discontinue as pt will be terminally extubated today   - DVT PPX HSQ, will discontinue as pt will be terminally extubated today     Endo:  - DMT2 on ISS  - Given hyperglycemia, on insulin drip with q1h FS, will discontinue as pt will be terminally extubated today    - Goal -180    Skin:   - Bryant  - RIJ central line     Dispo:  Critically ill requiring ICU level of care.    Pt is now DNR, plan for terminal extubation today around noon  Start Dilaudid drip, Dilaudid 1 mg q1h PRN for dyspnea, Valium 2.5 mg IVP x1 and Glycopyrrolate .2 mg q8h for secretions

## 2022-07-07 NOTE — PROGRESS NOTE ADULT - SUBJECTIVE AND OBJECTIVE BOX
Patient is a 77y old  Male who presents with a chief complaint of Substernal chest pain since last night (05 Jul 2022 07:31)    Patient seen in follow up for JIMENEZ.        PAST MEDICAL HISTORY:  Lung cancer, upper lobe, left    Heart attack    Stented coronary artery    Sleep apnea    HTN (hypertension)    High cholesterol    Diabetes    Insulin pump in place    Hiatal hernia    Vertigo    Stented coronary artery    Type 2 diabetes mellitus    TIA (transient ischemic attack)    Chronic back pain    Spinal stenosis in cervical region    Spinal stenosis of lumbar region    Lumbar herniated disc    OA (osteoarthritis)    CHF (congestive heart failure)    Cellulitis        MEDICATIONS  (STANDING):  glycopyrrolate Injectable 0.2 milliGRAM(s) IV Push every 8 hours  HYDROmorphone Infusion 2 mG/Hr (2 mL/Hr) IV Continuous <Continuous>    MEDICATIONS  (PRN):  HYDROmorphone  Injectable 1 milliGRAM(s) IV Push every 1 hour PRN dyspnea    T(C): 37.1 (07-07-22 @ 08:10), Max: 38.9 (07-05-22 @ 21:20)  HR: 79 (07-07-22 @ 14:00) (59 - 120)  BP: 79/45 (07-07-22 @ 14:00) (64/39 - 116/69)  RR: 12 (07-07-22 @ 14:00)  SpO2: 87% (07-07-22 @ 14:00)  Wt(kg): --  I&O's Detail    06 Jul 2022 07:01  -  07 Jul 2022 07:00  --------------------------------------------------------  IN:    dextrose 5%: 2200 mL    DOBUTamine: 300 mL    Free Water: 600 mL    Insulin: 25 mL    Insulin: 211.8 mL    IV PiggyBack: 300 mL    Nepro with Carb Steady: 960 mL    Norepinephrine: 893.9 mL    Propofol: 227.5 mL  Total IN: 5718.2 mL    OUT:    Indwelling Catheter - Urethral (mL): 1625 mL  Total OUT: 1625 mL    Total NET: 4093.2 mL      07 Jul 2022 07:01  -  07 Jul 2022 17:14  --------------------------------------------------------  IN:    dextrose 5%: 400 mL    DOBUTamine: 50 mL    HYRDOmorphone: 4 mL    Insulin: 35.9 mL    Nepro with Carb Steady: 280 mL    Norepinephrine: 143.2 mL    Propofol: 37.5 mL  Total IN: 950.6 mL    OUT:  Total OUT: 0 mL    Total NET: 950.6 mL                PHYSICAL EXAM:  General: on vent  Respiratory: b/l air entry  Cardiovascular: S1 S2  Gastrointestinal: soft  Extremities:  edema    Mode: AC/ CMV (Assist Control/ Continuous Mandatory Ventilation), RR (machine): 22, TV (machine): 450, FiO2: 30, PEEP: 5, ITime: 1, MAP: 13, PIP: 25  LABORATORY:                        10.0   14.52 )-----------( 151      ( 07 Jul 2022 05:52 )             34.1     07-07    150<H>  |  113<H>  |  76<H>  ----------------------------<  248<H>  4.0   |  30  |  1.70<H>    Ca    8.1<L>      07 Jul 2022 05:52  Phos  2.8     07-07  Mg     2.5     07-07    TPro  5.8<L>  /  Alb  2.2<L>  /  TBili  1.3<H>  /  DBili  x   /  AST  141<H>  /  ALT  998<H>  /  AlkPhos  156<H>  07-07    Sodium, Serum: 150 mmol/L (07-07 @ 05:52)  Sodium, Serum: 159 mmol/L (07-06 @ 15:25)  Sodium, Serum: 156 mmol/L (07-06 @ 06:27)    Potassium, Serum: 4.0 mmol/L (07-07 @ 05:52)  Potassium, Serum: 4.4 mmol/L (07-06 @ 15:25)  Potassium, Serum: 3.1 mmol/L (07-06 @ 06:27)    Hemoglobin: 10.0 g/dL (07-07 @ 05:52)  Hemoglobin: 11.3 g/dL (07-06 @ 06:27)  Hemoglobin: 12.5 g/dL (07-05 @ 06:34)    Creatinine, Serum 1.70 (07-07 @ 05:52)  Creatinine, Serum 1.80 (07-06 @ 15:25)  Creatinine, Serum 1.80 (07-06 @ 06:27)  Creatinine, Serum 2.10 (07-05 @ 16:05)        LIVER FUNCTIONS - ( 07 Jul 2022 05:52 )  Alb: 2.2 g/dL / Pro: 5.8 g/dL / ALK PHOS: 156 U/L / ALT: 998 U/L / AST: 141 U/L / GGT: x

## 2022-07-07 NOTE — PROGRESS NOTE ADULT - SUBJECTIVE AND OBJECTIVE BOX
****** CHARTING IN PROGRESS *******    INTERVAL HPI/OVERNIGHT EVENTS: No acute overnight events. Febrile Tmax 101.1 F at 16:21. On Levophed and Dobutamine. Pt is now DNR, plan for terminal extubation today.     SUBJECTIVE: Patient seen and examined at bedside.     ROS: Unable to obtain as pt is intubated     OBJECTIVE:    VITAL SIGNS:  ICU Vital Signs Last 24 Hrs  T(C): 37.1 (07 Jul 2022 04:00), Max: 38.4 (06 Jul 2022 16:21)  T(F): 98.7 (07 Jul 2022 04:00), Max: 101.1 (06 Jul 2022 16:21)  HR: 80 (07 Jul 2022 06:00) (59 - 120)  BP: 104/59 (07 Jul 2022 06:00) (91/54 - 116/69)  BP(mean): 76 (07 Jul 2022 06:00) (66 - 88)  ABP: --  ABP(mean): --  RR: 33 (07 Jul 2022 06:00) (27 - 61)  SpO2: 100% (07 Jul 2022 06:00) (92% - 100%)    Mode: AC/ CMV (Assist Control/ Continuous Mandatory Ventilation), RR (machine): 22, TV (machine): 450, FiO2: 30, PEEP: 5, ITime: 1, MAP: 13, PIP: 26    07-06 @ 07:01  -  07-07 @ 07:00  --------------------------------------------------------  IN: 5718.2 mL / OUT: 1625 mL / NET: 4093.2 mL      CAPILLARY BLOOD GLUCOSE      POCT Blood Glucose.: 238 mg/dL (07 Jul 2022 06:52)      PHYSICAL EXAM:  General: NAD, sedated   HEENT: NCAT, PERRL, clear conjunctiva  Respiratory: diminished BS BL  Cardiovascular: RRR, normal S1S2, no M/R/G  Abdomen: soft, NT/ND, bowel sounds in all four quadrants, no palpable masses, obese  Extremities: no clubbing, cyanosis, or edema, BLE cool to touch     MEDICATIONS:  MEDICATIONS  (STANDING):  aspirin  chewable 81 milliGRAM(s) Oral daily  chlorhexidine 0.12% Liquid 15 milliLiter(s) Oral Mucosa every 12 hours  chlorhexidine 2% Cloths 1 Application(s) Topical <User Schedule>  dextrose 5%. 1000 milliLiter(s) (100 mL/Hr) IV Continuous <Continuous>  dextrose 5%. 1000 milliLiter(s) (100 mL/Hr) IV Continuous <Continuous>  dextrose 5%. 1000 milliLiter(s) (50 mL/Hr) IV Continuous <Continuous>  dextrose 50% Injectable 25 Gram(s) IV Push once  dextrose 50% Injectable 12.5 Gram(s) IV Push once  dextrose 50% Injectable 25 Gram(s) IV Push once  DOBUTamine Infusion 5 MICROgram(s)/kG/Min (12.5 mL/Hr) IV Continuous <Continuous>  glucagon  Injectable 1 milliGRAM(s) IntraMuscular once  heparin   Injectable 5000 Unit(s) SubCutaneous every 12 hours  insulin regular Infusion 12 Unit(s)/Hr (12 mL/Hr) IV Continuous <Continuous>  norepinephrine Infusion 0.05 MICROgram(s)/kG/Min (7.78 mL/Hr) IV Continuous <Continuous>  pantoprazole  Injectable 40 milliGRAM(s) IV Push daily  piperacillin/tazobactam IVPB.. 3.375 Gram(s) IV Intermittent every 8 hours  propofol Infusion 10 MICROgram(s)/kG/Min (4.98 mL/Hr) IV Continuous <Continuous>  ticagrelor 90 milliGRAM(s) Oral every 12 hours    MEDICATIONS  (PRN):  dextrose Oral Gel 15 Gram(s) Oral once PRN Blood Glucose LESS THAN 70 milliGRAM(s)/deciliter      ALLERGIES:  Allergies    No Known Allergies    Intolerances        LABS:                        10.0   14.52 )-----------( 151      ( 07 Jul 2022 05:52 )             34.1     07-07    150<H>  |  113<H>  |  76<H>  ----------------------------<  248<H>  4.0   |  30  |  1.70<H>    Ca    8.1<L>      07 Jul 2022 05:52  Phos  2.8     07-07  Mg     2.5     07-07    TPro  5.8<L>  /  Alb  2.2<L>  /  TBili  1.3<H>  /  DBili  x   /  AST  141<H>  /  ALT  998<H>  /  AlkPhos  156<H>  07-07    PT/INR - ( 07 Jul 2022 05:52 )   PT: 13.9 sec;   INR: 1.19 ratio         PTT - ( 07 Jul 2022 05:52 )  PTT:29.7 sec      RADIOLOGY & ADDITIONAL TESTS: Reviewed.       CENTRAL LINE: RIJ  TLC      DATE INSERTED:               PETERSON: Y                       DATE INSERTED: 7/2                A-LINE: N                       DATE INSERTED:                    GLOBAL ISSUE/BEST PRACTICE  Analgesia: N   Sedation: Propofol  HOB elevation: yes  Stress ulcer prophylaxis: Protonix   VTE prophylaxis: Heparin drip  Glycemic control: Y  Nutrition: NPO with Tube Feeds     CODE STATUS: Full Code ****** CHARTING IN PROGRESS *******    INTERVAL HPI/OVERNIGHT EVENTS: No acute overnight events. Febrile Tmax 101.1 F at 16:21. On Levophed and Dobutamine. Pt is now DNR, plan for terminal extubation today.     SUBJECTIVE: Patient seen and examined at bedside.     ROS: Unable to obtain as pt is intubated     OBJECTIVE:    VITAL SIGNS:  ICU Vital Signs Last 24 Hrs  T(C): 37.1 (07 Jul 2022 04:00), Max: 38.4 (06 Jul 2022 16:21)  T(F): 98.7 (07 Jul 2022 04:00), Max: 101.1 (06 Jul 2022 16:21)  HR: 80 (07 Jul 2022 06:00) (59 - 120)  BP: 104/59 (07 Jul 2022 06:00) (91/54 - 116/69)  BP(mean): 76 (07 Jul 2022 06:00) (66 - 88)  ABP: --  ABP(mean): --  RR: 33 (07 Jul 2022 06:00) (27 - 61)  SpO2: 100% (07 Jul 2022 06:00) (92% - 100%)    Mode: AC/ CMV (Assist Control/ Continuous Mandatory Ventilation), RR (machine): 22, TV (machine): 450, FiO2: 30, PEEP: 5, ITime: 1, MAP: 13, PIP: 26    07-06 @ 07:01  -  07-07 @ 07:00  --------------------------------------------------------  IN: 5718.2 mL / OUT: 1625 mL / NET: 4093.2 mL      CAPILLARY BLOOD GLUCOSE      POCT Blood Glucose.: 238 mg/dL (07 Jul 2022 06:52)      PHYSICAL EXAM:  General: NAD, sedated   HEENT: NCAT, PERRL, clear conjunctiva  Respiratory: diminished BS BL  Cardiovascular: RRR, normal S1S2, no M/R/G  Abdomen: soft, NT/ND, bowel sounds in all four quadrants, no palpable masses, obese  Extremities: no clubbing, cyanosis, or edema, BLE cool to touch     MEDICATIONS:  MEDICATIONS  (STANDING):  aspirin  chewable 81 milliGRAM(s) Oral daily  chlorhexidine 0.12% Liquid 15 milliLiter(s) Oral Mucosa every 12 hours  chlorhexidine 2% Cloths 1 Application(s) Topical <User Schedule>  dextrose 5%. 1000 milliLiter(s) (100 mL/Hr) IV Continuous <Continuous>  dextrose 5%. 1000 milliLiter(s) (100 mL/Hr) IV Continuous <Continuous>  dextrose 5%. 1000 milliLiter(s) (50 mL/Hr) IV Continuous <Continuous>  dextrose 50% Injectable 25 Gram(s) IV Push once  dextrose 50% Injectable 12.5 Gram(s) IV Push once  dextrose 50% Injectable 25 Gram(s) IV Push once  DOBUTamine Infusion 5 MICROgram(s)/kG/Min (12.5 mL/Hr) IV Continuous <Continuous>  glucagon  Injectable 1 milliGRAM(s) IntraMuscular once  heparin   Injectable 5000 Unit(s) SubCutaneous every 12 hours  insulin regular Infusion 12 Unit(s)/Hr (12 mL/Hr) IV Continuous <Continuous>  norepinephrine Infusion 0.05 MICROgram(s)/kG/Min (7.78 mL/Hr) IV Continuous <Continuous>  pantoprazole  Injectable 40 milliGRAM(s) IV Push daily  piperacillin/tazobactam IVPB.. 3.375 Gram(s) IV Intermittent every 8 hours  propofol Infusion 10 MICROgram(s)/kG/Min (4.98 mL/Hr) IV Continuous <Continuous>  ticagrelor 90 milliGRAM(s) Oral every 12 hours    MEDICATIONS  (PRN):  dextrose Oral Gel 15 Gram(s) Oral once PRN Blood Glucose LESS THAN 70 milliGRAM(s)/deciliter      ALLERGIES:  Allergies    No Known Allergies    Intolerances        LABS:                        10.0   14.52 )-----------( 151      ( 07 Jul 2022 05:52 )             34.1     07-07    150<H>  |  113<H>  |  76<H>  ----------------------------<  248<H>  4.0   |  30  |  1.70<H>    Ca    8.1<L>      07 Jul 2022 05:52  Phos  2.8     07-07  Mg     2.5     07-07    TPro  5.8<L>  /  Alb  2.2<L>  /  TBili  1.3<H>  /  DBili  x   /  AST  141<H>  /  ALT  998<H>  /  AlkPhos  156<H>  07-07    PT/INR - ( 07 Jul 2022 05:52 )   PT: 13.9 sec;   INR: 1.19 ratio         PTT - ( 07 Jul 2022 05:52 )  PTT:29.7 sec      RADIOLOGY & ADDITIONAL TESTS: Reviewed.   < from: Xray Chest 1 View- PORTABLE-Urgent (Xray Chest 1 View- PORTABLE-Urgent .) (07.05.22 @ 12:35) >  IMPRESSION:  Right upper lobe clearing.    CENTRAL LINE: RIJ  TLC      DATE INSERTED:               PETERSON: Y                       DATE INSERTED: 7/2                A-LINE: N                       DATE INSERTED:                    GLOBAL ISSUE/BEST PRACTICE  Analgesia: N   Sedation: Propofol  HOB elevation: yes  Stress ulcer prophylaxis: Protonix   VTE prophylaxis: Heparin drip  Glycemic control: Y  Nutrition: NPO with Tube Feeds     CODE STATUS: Full Code ****** CHARTING IN PROGRESS *******    INTERVAL HPI/OVERNIGHT EVENTS: No acute overnight events. Febrile Tmax 101.1 F at 16:21. On Levophed and Dobutamine. Pt is now DNR, plan for terminal extubation today.     SUBJECTIVE: Patient seen and examined at bedside.     ROS: Unable to obtain as pt is intubated     OBJECTIVE:    VITAL SIGNS:  ICU Vital Signs Last 24 Hrs  T(C): 37.1 (07 Jul 2022 04:00), Max: 38.4 (06 Jul 2022 16:21)  T(F): 98.7 (07 Jul 2022 04:00), Max: 101.1 (06 Jul 2022 16:21)  HR: 80 (07 Jul 2022 06:00) (59 - 120)  BP: 104/59 (07 Jul 2022 06:00) (91/54 - 116/69)  BP(mean): 76 (07 Jul 2022 06:00) (66 - 88)  ABP: --  ABP(mean): --  RR: 33 (07 Jul 2022 06:00) (27 - 61)  SpO2: 100% (07 Jul 2022 06:00) (92% - 100%)    Mode: AC/ CMV (Assist Control/ Continuous Mandatory Ventilation), RR (machine): 22, TV (machine): 450, FiO2: 30, PEEP: 5, ITime: 1, MAP: 13, PIP: 26    07-06 @ 07:01  -  07-07 @ 07:00  --------------------------------------------------------  IN: 5718.2 mL / OUT: 1625 mL / NET: 4093.2 mL      CAPILLARY BLOOD GLUCOSE      POCT Blood Glucose.: 238 mg/dL (07 Jul 2022 06:52)      PHYSICAL EXAM:  General: NAD, sedated   HEENT: NCAT, PERRL, clear conjunctiva  Respiratory: diminished BS BL  Cardiovascular: RRR, normal S1S2, no M/R/G  Abdomen: soft, NT/ND, bowel sounds in all four quadrants, no palpable masses, obese  Extremities: no clubbing, cyanosis, or edema, BLE cool to touch     MEDICATIONS:  MEDICATIONS  (STANDING):  aspirin  chewable 81 milliGRAM(s) Oral daily  chlorhexidine 0.12% Liquid 15 milliLiter(s) Oral Mucosa every 12 hours  chlorhexidine 2% Cloths 1 Application(s) Topical <User Schedule>  dextrose 5%. 1000 milliLiter(s) (100 mL/Hr) IV Continuous <Continuous>  dextrose 5%. 1000 milliLiter(s) (100 mL/Hr) IV Continuous <Continuous>  dextrose 5%. 1000 milliLiter(s) (50 mL/Hr) IV Continuous <Continuous>  dextrose 50% Injectable 25 Gram(s) IV Push once  dextrose 50% Injectable 12.5 Gram(s) IV Push once  dextrose 50% Injectable 25 Gram(s) IV Push once  DOBUTamine Infusion 5 MICROgram(s)/kG/Min (12.5 mL/Hr) IV Continuous <Continuous>  glucagon  Injectable 1 milliGRAM(s) IntraMuscular once  heparin   Injectable 5000 Unit(s) SubCutaneous every 12 hours  insulin regular Infusion 12 Unit(s)/Hr (12 mL/Hr) IV Continuous <Continuous>  norepinephrine Infusion 0.05 MICROgram(s)/kG/Min (7.78 mL/Hr) IV Continuous <Continuous>  pantoprazole  Injectable 40 milliGRAM(s) IV Push daily  piperacillin/tazobactam IVPB.. 3.375 Gram(s) IV Intermittent every 8 hours  propofol Infusion 10 MICROgram(s)/kG/Min (4.98 mL/Hr) IV Continuous <Continuous>  ticagrelor 90 milliGRAM(s) Oral every 12 hours    MEDICATIONS  (PRN):  dextrose Oral Gel 15 Gram(s) Oral once PRN Blood Glucose LESS THAN 70 milliGRAM(s)/deciliter      ALLERGIES:  Allergies    No Known Allergies    Intolerances        LABS:                        10.0   14.52 )-----------( 151      ( 07 Jul 2022 05:52 )             34.1     07-07    150<H>  |  113<H>  |  76<H>  ----------------------------<  248<H>  4.0   |  30  |  1.70<H>    Ca    8.1<L>      07 Jul 2022 05:52  Phos  2.8     07-07  Mg     2.5     07-07    TPro  5.8<L>  /  Alb  2.2<L>  /  TBili  1.3<H>  /  DBili  x   /  AST  141<H>  /  ALT  998<H>  /  AlkPhos  156<H>  07-07    PT/INR - ( 07 Jul 2022 05:52 )   PT: 13.9 sec;   INR: 1.19 ratio         PTT - ( 07 Jul 2022 05:52 )  PTT:29.7 sec      RADIOLOGY & ADDITIONAL TESTS: Reviewed.   < from: Xray Chest 1 View- PORTABLE-Urgent (Xray Chest 1 View- PORTABLE-Urgent .) (07.05.22 @ 12:35) >  IMPRESSION:  Right upper lobe clearing.    CENTRAL LINE: RIJ  TLC      DATE INSERTED:               PETERSON: Y                       DATE INSERTED: 7/2                A-LINE: N                       DATE INSERTED:                    GLOBAL ISSUE/BEST PRACTICE  Analgesia: N   Sedation: Propofol  HOB elevation: yes  Stress ulcer prophylaxis: Protonix   VTE prophylaxis: Heparin drip  Glycemic control: Y  Nutrition: NPO with Tube Feeds     CODE STATUS: DNR, comfort measures only  INTERVAL HPI/OVERNIGHT EVENTS: No acute overnight events. Febrile Tmax 101.1 F at 16:21. On Levophed and Dobutamine. Pt is now DNR, plan for terminal extubation today around noon.     SUBJECTIVE: Patient seen and examined at bedside.     ROS: Unable to obtain as pt is intubated     OBJECTIVE:    VITAL SIGNS:  ICU Vital Signs Last 24 Hrs  T(C): 37.1 (07 Jul 2022 04:00), Max: 38.4 (06 Jul 2022 16:21)  T(F): 98.7 (07 Jul 2022 04:00), Max: 101.1 (06 Jul 2022 16:21)  HR: 80 (07 Jul 2022 06:00) (59 - 120)  BP: 104/59 (07 Jul 2022 06:00) (91/54 - 116/69)  BP(mean): 76 (07 Jul 2022 06:00) (66 - 88)  ABP: --  ABP(mean): --  RR: 33 (07 Jul 2022 06:00) (27 - 61)  SpO2: 100% (07 Jul 2022 06:00) (92% - 100%)    Mode: AC/ CMV (Assist Control/ Continuous Mandatory Ventilation), RR (machine): 22, TV (machine): 450, FiO2: 30, PEEP: 5, ITime: 1, MAP: 13, PIP: 26    07-06 @ 07:01  -  07-07 @ 07:00  --------------------------------------------------------  IN: 5718.2 mL / OUT: 1625 mL / NET: 4093.2 mL      CAPILLARY BLOOD GLUCOSE      POCT Blood Glucose.: 238 mg/dL (07 Jul 2022 06:52)      PHYSICAL EXAM:  General: NAD, sedated   HEENT: NCAT, PERRL, clear conjunctiva  Respiratory: diminished BS BL  Cardiovascular: RRR, normal S1S2, no M/R/G  Abdomen: soft, NT/ND, bowel sounds in all four quadrants, no palpable masses, obese  Extremities: no clubbing, cyanosis, or edema, BLE cool to touch     MEDICATIONS:  MEDICATIONS  (STANDING):  aspirin  chewable 81 milliGRAM(s) Oral daily  chlorhexidine 0.12% Liquid 15 milliLiter(s) Oral Mucosa every 12 hours  chlorhexidine 2% Cloths 1 Application(s) Topical <User Schedule>  dextrose 5%. 1000 milliLiter(s) (100 mL/Hr) IV Continuous <Continuous>  dextrose 5%. 1000 milliLiter(s) (100 mL/Hr) IV Continuous <Continuous>  dextrose 5%. 1000 milliLiter(s) (50 mL/Hr) IV Continuous <Continuous>  dextrose 50% Injectable 25 Gram(s) IV Push once  dextrose 50% Injectable 12.5 Gram(s) IV Push once  dextrose 50% Injectable 25 Gram(s) IV Push once  DOBUTamine Infusion 5 MICROgram(s)/kG/Min (12.5 mL/Hr) IV Continuous <Continuous>  glucagon  Injectable 1 milliGRAM(s) IntraMuscular once  heparin   Injectable 5000 Unit(s) SubCutaneous every 12 hours  insulin regular Infusion 12 Unit(s)/Hr (12 mL/Hr) IV Continuous <Continuous>  norepinephrine Infusion 0.05 MICROgram(s)/kG/Min (7.78 mL/Hr) IV Continuous <Continuous>  pantoprazole  Injectable 40 milliGRAM(s) IV Push daily  piperacillin/tazobactam IVPB.. 3.375 Gram(s) IV Intermittent every 8 hours  propofol Infusion 10 MICROgram(s)/kG/Min (4.98 mL/Hr) IV Continuous <Continuous>  ticagrelor 90 milliGRAM(s) Oral every 12 hours    MEDICATIONS  (PRN):  dextrose Oral Gel 15 Gram(s) Oral once PRN Blood Glucose LESS THAN 70 milliGRAM(s)/deciliter      ALLERGIES:  Allergies    No Known Allergies    Intolerances        LABS:                        10.0   14.52 )-----------( 151      ( 07 Jul 2022 05:52 )             34.1     07-07    150<H>  |  113<H>  |  76<H>  ----------------------------<  248<H>  4.0   |  30  |  1.70<H>    Ca    8.1<L>      07 Jul 2022 05:52  Phos  2.8     07-07  Mg     2.5     07-07    TPro  5.8<L>  /  Alb  2.2<L>  /  TBili  1.3<H>  /  DBili  x   /  AST  141<H>  /  ALT  998<H>  /  AlkPhos  156<H>  07-07    PT/INR - ( 07 Jul 2022 05:52 )   PT: 13.9 sec;   INR: 1.19 ratio         PTT - ( 07 Jul 2022 05:52 )  PTT:29.7 sec      RADIOLOGY & ADDITIONAL TESTS: Reviewed.   < from: Xray Chest 1 View- PORTABLE-Urgent (Xray Chest 1 View- PORTABLE-Urgent .) (07.05.22 @ 12:35) >  IMPRESSION:  Right upper lobe clearing.    CENTRAL LINE: RIJ  TLC      DATE INSERTED:               PETERSON: Y                       DATE INSERTED: 7/2                A-LINE: N                       DATE INSERTED:                    GLOBAL ISSUE/BEST PRACTICE  Analgesia: Dilaudid   Sedation: Propofol  HOB elevation: yes  Stress ulcer prophylaxis: Protonix   VTE prophylaxis: Heparin drip  Glycemic control: Y  Nutrition: NPO with Tube Feeds     CODE STATUS: DNR, comfort measures only

## 2022-07-07 NOTE — PROGRESS NOTE ADULT - SUBJECTIVE AND OBJECTIVE BOX
[facility]  INFECTIOUS DISEASES   [address] [place] [address]  Tel: [phone]     Fax: [phone]  ======================================================  MD Thao Moore Kaushal, MD Cho, Michelle, MD   ======================================================    [id number]  BEKAH AMOR     Follow up: Pneumonia    Intubated and sedated, had fever 101.1.   Family has decided to change to comfort measures only.     PAST MEDICAL & SURGICAL HISTORY:  Lung cancer, upper lobe, left  Heart attack  1990,2005  Stented coronary artery  six cardiac stents  Sleep apnea  uses c pap at home  HTN (hypertension)  High cholesterol  Diabetes  on metformin, humalog insulin pump  Insulin pump in place  Hiatal hernia  Vertigo  Stented coronary artery  Drug eluding x 6 stents  Type 2 diabetes mellitus  TIA (transient ischemic attack)  Jan 2014  Chronic back pain  Spinal stenosis in cervical region  Spinal stenosis of lumbar region  Lumbar herniated disc  OA (osteoarthritis)  CHF (congestive heart failure)  Cellulitis  S/P partial lobectomy of lung  lobectomy of left upper lobe. April 2014  History of throat surgery  1996  S/P angioplasty with stent  2005 and 2006    Social Hx: No smoking, ETOH or drugs     FAMILY HISTORY:  No pertinent family history in first degree relatives    Allergies  No Known Allergies    Antibiotics:  Ceftriaxone      REVIEW OF SYSTEMS:  Intubated     Physical Exam:  Vital Signs Last 24 Hrs  T(C): 37.1 (07 Jul 2022 08:10), Max: 38.4 (06 Jul 2022 16:21)  T(F): 98.8 (07 Jul 2022 08:10), Max: 101.1 (06 Jul 2022 16:21)  HR: 79 (07 Jul 2022 14:00) (78 - 120)  BP: 79/45 (07 Jul 2022 14:00) (64/39 - 115/62)  BP(mean): 57 (07 Jul 2022 14:00) (45 - 88)  RR: 12 (07 Jul 2022 14:00) (12 - 61)  SpO2: 87% (07 Jul 2022 14:00) (77% - 100%)  GEN: Intubated, NG tube +   HEENT: normocephalic and atraumatic.   NECK: Supple.  No lymphadenopathy   LUNGS: Clear to auscultation. R IJ looks fine   HEART: Regular rate and rhythm without murmur.  ABDOMEN: Soft, nontender, and nondistended.  Positive bowel sounds.    : No CVA tenderness  EXTREMITIES: Without any cyanosis, clubbing, rash, lesions or edema.  NEUROLOGIC: unable   PSYCHIATRIC: sedated   SKIN: No rash or ulcer     Labs:                        10.0   14.52 )-----------( 151      ( 07 Jul 2022 05:52 )             34.1     07-07    150<H>  |  113<H>  |  76<H>  ----------------------------<  248<H>  4.0   |  30  |  1.70<H>    Ca    8.1<L>      07 Jul 2022 05:52  Phos  2.8     07-07  Mg     2.5     07-07    TPro  5.8<L>  /  Alb  2.2<L>  /  TBili  1.3<H>  /  DBili  x   /  AST  141<H>  /  ALT  998<H>  /  AlkPhos  156<H>  07-07    Culture - Sputum (collected 07-02-22 @ 09:30)  Source: ET Tube ET Tube  Gram Stain (07-02-22 @ 17:36):    Few Squamous epithelial cells per low power field    Few polymorphonuclear leukocytes per low power field    Few Gram positive cocci in pairs per oil power field  Final Report (07-04-22 @ 19:46):    Moderate Moraxella catarrhalis "Susceptibilities not performed"    Normal Respiratory Santana present    Culture - Blood (collected 07-02-22 @ 09:20)  Source: .Blood Blood-Peripheral  Final Report (07-07-22 @ 14:00):    No Growth Final    Culture - Blood (collected 07-02-22 @ 09:15)  Source: .Blood Blood-Peripheral  Final Report (07-07-22 @ 14:00):    No Growth Final    Culture - Urine (collected 06-30-22 @ 10:00)  Source: Clean Catch Clean Catch (Midstream)  Final Report (07-01-22 @ 13:51):    <10,000 CFU/mL Normal Urogenital Santana    Culture - Blood (collected 06-30-22 @ 09:15)  Source: .Blood Blood-Peripheral  Final Report (07-05-22 @ 12:00):    No Growth Final    Culture - Blood (collected 06-30-22 @ 09:05)  Source: .Blood Blood-Peripheral  Final Report (07-05-22 @ 12:00):    No Growth Final    WBC Count: 14.52 K/uL (07-07-22 @ 05:52)  WBC Count: 14.35 K/uL (07-06-22 @ 06:27)  WBC Count: 13.06 K/uL (07-05-22 @ 06:34)  WBC Count: 10.72 K/uL (07-04-22 @ 04:55)  WBC Count: 10.18 K/uL (07-03-22 @ 06:20)    Creatinine, Serum: 1.70 mg/dL (07-07-22 @ 05:52)  Creatinine, Serum: 1.80 mg/dL (07-06-22 @ 15:25)  Creatinine, Serum: 1.80 mg/dL (07-06-22 @ 06:27)  Creatinine, Serum: 2.10 mg/dL (07-05-22 @ 16:05)  Creatinine, Serum: 2.00 mg/dL (07-05-22 @ 06:34)  Creatinine, Serum: 1.90 mg/dL (07-04-22 @ 04:55)  Creatinine, Serum: 1.80 mg/dL (07-03-22 @ 17:38)  Creatinine, Serum: 2.00 mg/dL (07-03-22 @ 06:20)  Creatinine, Serum: 2.30 mg/dL (07-02-22 @ 19:50)    Procalcitonin, Serum: 0.11 ng/mL (06-30-22 @ 09:20)     COVID-19 PCR: NotDetec (07-07-22 @ 08:00)  SARS-CoV-2 Result: NotDetec (06-30-22 @ 09:20)    All imaging and other data have been reviewed.  < from: Xray Chest 1 View-PORTABLE IMMEDIATE (Xray Chest 1 View-PORTABLE IMMEDIATE .) (07.02.22 @ 00:16) >  Impression:  Airspace opacities slightly improved on the left and stable on the right.  Findings may be due to pulmonary edema but underlying   infection/inflammation not excluded.    < from: CT Chest No Cont (06.30.22 @ 12:16) >  IMPRESSION:  Interstitial edema and bilateral pleural effusions suggest an element of   CHF/fluid overload.  Airspace disease throughout right lung may represent pulmonary edema.   Inflammation/infection not excluded.    Assessment and Plan:   78 yo man with PMH of HTN, DM2, Lung cancer, CHF, CAD, PAPO, and chronic back pain was admitted with chest discomfort on 6/30. Pain was same as his past MIs.   On 7/1 early morning was intubated and had a very high lactate and troponin. On 7/2 was started on ceftriaxone for possible pneumonia, and ET aspirate was sent for culture  that is now back Moraxella and normal respiratory santana.  Chest CT is more consistent with edema rather than pneumonia but since has fever will continue on antibiotics.   No other source of infection at this time but will need a full fever work up. Multiorgan failure   MRSA PCR negative so MRSA pneumonia less likely.  With shock could have ischemia in organs including bowel, had a very high lactate.     Recommendations:   - Blood culture x2 NGTD   - UA and UC negative   - ET tube culture Moraxella that could be colonizer  - Fever and leukocytosis could be reactional to shock and MI, but also high risk for infection   - High creat and transaminitis  - Zosyn was stopped.   - Comfort care as per family wish    Will sign off please call with any question.     Carlos Hidalgo MD  Division of Infectious Diseases   Please call ID service at [phone] with any question.      35 minutes spent on total encounter assessing patient, examination, chart reivew, counseling and coordinating care by the attending physician/nurse/care manager.

## 2022-07-07 NOTE — PROGRESS NOTE ADULT - ASSESSMENT
The patient is a 77 year old male with a history of DM, CAD with prior MI s/p multiple PCI, chronic systolic heart failure s/p CRT-D who presents with chest pain and shortness of breath in the setting of acute systolic heart failure, cardiogenic shock, possible NSTEMI.    Plan:  - The patient has a history of multiple PCI including intervention on ISR and is not a candidate for CABG or future PCI  - Troponin peaked at 1360 in the setting of possible NSTEMI vs. heart failure/shock. No need to trend.  - Echo with severely reduced LV systolic function  - Continue aspirin 81 mg daily  - Continue ticagrelor 90 mg bid  - Completed heparin drip for medical management of NSTEMI  - Hold Entresto, spironolactone, carvedilol due to hypotension/shock  - Continue norepinephrine and wean off as tolerated  - Continue dobutamine 5 mcg/kg/min  - Consider lowering bumetanide due to worsening kidney function and hypernatremia  - The patient is not a candidate for advanced heart failure therapies  - Renal and liver function improving  - ICU care  - Plan for possible palliative extubation

## 2022-07-07 NOTE — DIETITIAN INITIAL EVALUATION ADULT - PERTINENT MEDS FT
MEDICATIONS  (STANDING):  aspirin  chewable 81 milliGRAM(s) Oral daily  chlorhexidine 0.12% Liquid 15 milliLiter(s) Oral Mucosa every 12 hours  chlorhexidine 2% Cloths 1 Application(s) Topical <User Schedule>  dextrose 5%. 1000 milliLiter(s) (100 mL/Hr) IV Continuous <Continuous>  dextrose 5%. 1000 milliLiter(s) (50 mL/Hr) IV Continuous <Continuous>  dextrose 5%. 1000 milliLiter(s) (100 mL/Hr) IV Continuous <Continuous>  dextrose 50% Injectable 25 Gram(s) IV Push once  dextrose 50% Injectable 12.5 Gram(s) IV Push once  dextrose 50% Injectable 25 Gram(s) IV Push once  DOBUTamine Infusion 5 MICROgram(s)/kG/Min (12.5 mL/Hr) IV Continuous <Continuous>  glucagon  Injectable 1 milliGRAM(s) IntraMuscular once  heparin   Injectable 5000 Unit(s) SubCutaneous every 12 hours  insulin regular Infusion 12 Unit(s)/Hr (12 mL/Hr) IV Continuous <Continuous>  norepinephrine Infusion 0.05 MICROgram(s)/kG/Min (7.78 mL/Hr) IV Continuous <Continuous>  pantoprazole  Injectable 40 milliGRAM(s) IV Push daily  piperacillin/tazobactam IVPB.. 3.375 Gram(s) IV Intermittent every 8 hours  propofol Infusion 10 MICROgram(s)/kG/Min (4.98 mL/Hr) IV Continuous <Continuous>  ticagrelor 90 milliGRAM(s) Oral every 12 hours    MEDICATIONS  (PRN):  dextrose Oral Gel 15 Gram(s) Oral once PRN Blood Glucose LESS THAN 70 milliGRAM(s)/deciliter

## 2022-07-07 NOTE — DIETITIAN INITIAL EVALUATION ADULT - ADD RECOMMEND
Plan for terminal wean today, comfort measures only. Will remain available for further nutrition support if in alignment with GOC.

## 2022-07-07 NOTE — DIETITIAN INITIAL EVALUATION ADULT - PERTINENT LABORATORY DATA
07-07    150<H>  |  113<H>  |  76<H>  ----------------------------<  248<H>  4.0   |  30  |  1.70<H>    Ca    8.1<L>      07 Jul 2022 05:52  Phos  2.8     07-07  Mg     2.5     07-07    TPro  5.8<L>  /  Alb  2.2<L>  /  TBili  1.3<H>  /  DBili  x   /  AST  141<H>  /  ALT  998<H>  /  AlkPhos  156<H>  07-07  POCT Blood Glucose.: 245 mg/dL (07-07-22 @ 08:08)  A1C with Estimated Average Glucose Result: 8.6 % (07-01-22 @ 04:30)

## 2022-07-08 NOTE — PROGRESS NOTE ADULT - ATTENDING COMMENTS
Critically ill 77 year old male with CAD s/p multiple PCI, chronic systolic heart failure s/p ICD here with chest pain and dyspnea. Found to have acute on chronic systolic heart failure exacerbation / NSTEMI c/b cardiogenic shock. He has an acute kidney injury, lactic acidosis, shock liver. Intubated for acute hypoxemic respiratory. Remains intubated, on vasopressor / inotropic support.    NEURO: Continue propofol, precedex  CVS: Remains on norepinephrine. Continue dobutamine. Continue ASA, Brilinta. Statin on hold.  PULM: Lung protective ventilation. PST as tolerates.  GI: Enteral feeds. Trend LFTs.  RENAL: Monitor UOP, Cr. Free water added for hypernatremia. Hold Bumex for now.  ENDO: Started on insulin gtt for hyperglycemia. Adjust as needed.  ID: Growing Moraxella in sputum. Still having fevers. ID input noted. Change ceftriaxone to zosyn.   PPX: HSQ for DVT PPX    Discussed GOC with patient's son at bedside. Family to consider extubation tentatively this week with understanding that patient has not been improving despite multiple interventions, supportive care. Will follow up.    Prognosis guarded
78 yo man with Hx CAD with 6 stents, HFrEF, BiVICD, most recent MI 2mos ago, presenting with CP and dyspnea, found to be in cardiogenic shock with NSTEMI, JIMENEZ, lactic acidosis.  He rapidly improved follow with resolution of shock but then 7/2 decompensated with recurrent cardiogenic shock, acute hypoxemic respiratory failure, shock liver and lactic acidosis.      --wean off propofol and then fentanyl, transition to precedex for sedation  --cardiogenic shock on low dose levophed  volume overload, continue bumex 2 bid  --CAD, NSTEMI, continue ASA, brilinta, heparin gtt   statin on hold due to shock liver, no BB due to shock  --acute hypoxemic respiratory failure from acute pulmonary edema  diurese as above  wean FiO2 as tolerates, PS trials when more awake  --JIMENEZ improving with diuresis  lactate acidosis improving  --shock liver improving, continue supportive care  cont TF  --now with fever, possible pneumonia, continue CTX, f/u sputum Cx  --DM2 uncontrolled, increase lantus to 32units and cont ISS  --family updated by Dr. Wakefield
Critically ill 77 year old male with CAD s/p multiple PCI, chronic systolic heart failure s/p ICD here with chest pain and dyspnea. Found to have acute on chronic systolic heart failure exacerbation / NSTEMI c/b cardiogenic shock. He has an acute kidney injury, lactic acidosis, shock liver. Intubated for acute hypoxemic respiratory. Had remained intubated and on vasopressor / inotropic support with minimal improvement. Decision made 7/7 to pursue comfort measures. Patient was extubated yesterday. He is DNR/DNI. Continue dilaudid gtt and PRN IVP for dyspnea. Appears comfortable. Family at bedside.
Critically ill 77 year old male with CAD s/p multiple PCI, chronic systolic heart failure s/p ICD here with chest pain and dyspnea. Found to have acute on chronic systolic heart failure exacerbation / NSTEMI c/b cardiogenic shock. He has an acute kidney injury, lactic acidosis, shock liver. Intubated for acute hypoxemic respiratory. Remains intubated, on vasopressor / inotropic support with minimal improvement. Decision made to pursue comfort measures today. Patient is DNR/DNI. Now on comfort care.
Critically ill 77 year old male with CAD s/p multiple PCI, chronic systolic heart failure s/p ICD here with chest pain and dyspnea. Found to have acute on chronic systolic heart failure exacerbation / NSTEMI c/b cardiogenic shock. He has an acute kidney injury, lactic acidosis, shock liver. Intubated for acute hypoxemic respiratory. Remains intubated, on vasopressor / inotropic support.    NEURO: Hold sedation to assess mental status.  CVS: Remains on norepinephrine, dobutamine. Continue ASA, Brilinta. Statin on hold.  PULM: Lung protective ventilation. PST as tolerates.  GI: Enteral feeds. Trend LFTs.  RENAL: Monitor UOP, Cr. Free water + D5W added for hypernatremia. Hold Bumex for now.  ENDO: Insulin gtt for hyperglycemia. Adjust as needed.  ID: Growing Moraxella in sputum. Still having fevers. ID input noted. Change ceftriaxone to zosyn 7/5.   PPX: HSQ for DVT PPX    Family to consider extubation tentatively this week with understanding that patient has not been improving despite multiple interventions. Will follow up with them.    Prognosis guarded.
78 yo man with Hx CAD with 6 stents, HFrEF, BiVICD, most recent MI 2mos ago, presenting with CP and dyspnea, found to be in cardiogenic shock with NSTEMI, JIMENEZ, lactic acidosis.  He rapidly improved follow with resolution of shock but last night decompensated with worsening shock, acute hypoxemic respiratory failure, shock liver and lactic acidosis.      --sedated with propofol and fentanyl, wean propofol down  --cardiogenic shock on low dose levophed  volume overload, responding to bumex given this am  --CAD, NSTEMI, continue ASA, brilinta, heparin gtt  statin on hold due to shock liver, no BB due to shock  trending cardiac enzymes to downtrending  --acute hypoxemic respiratory failure from acute pulmonary edema  diurese as above  continue full vent support, wean FiO2  --JIMENEZ, continue diuresis  lactate acidosis improving  --shock liver, continue supportive care  start TF  --low suspicion for infection but empirically start CTX for pneumonia, check sputum Cx  --DM2, continue lantus and ISS  --plan discussed with wife and sons

## 2022-07-08 NOTE — PROGRESS NOTE ADULT - PROBLEM SELECTOR PLAN 5
comfortcare
pulm eval  vent per critical care

## 2022-07-08 NOTE — PROGRESS NOTE ADULT - ASSESSMENT
JIMENEZ on CKD 3  CHF  Shock on pressors  h/o Hypertension  Diabetes  Hypernatremia    Events noted. Overall prognosis poor. Will sign off. Please recall if needed. Thank you.

## 2022-07-08 NOTE — PROGRESS NOTE ADULT - SUBJECTIVE AND OBJECTIVE BOX
Chief Complaint: Chest pain, shortness of breath    Interval Events: No events overnight.    Review of Systems:  Unable to obtain    Physical Exam:  Vital Signs Last 24 Hrs  T(C): 37.3 (07 Jul 2022 21:00), Max: 37.3 (07 Jul 2022 21:00)  T(F): 99.1 (07 Jul 2022 21:00), Max: 99.1 (07 Jul 2022 21:00)  HR: 79 (08 Jul 2022 09:00) (70 - 88)  BP: 69/45 (08 Jul 2022 09:00) (62/42 - 107/51)  BP(mean): 53 (08 Jul 2022 09:00) (45 - 74)  RR: 13 (08 Jul 2022 09:00) (9 - 33)  SpO2: 95% (08 Jul 2022 09:00) (68% - 100%)  General: Unresponsive  HEENT: MMM  Neck: No JVD, no carotid bruit  Lungs: Coarse bilaterally  CV: RRR, nl S1/S2, no M/R/G  Abdomen: S/NT/ND, +BS  Extremities: 1+ LE edema, no cyanosis  Neuro: AAOx0  Skin: No rash    Labs:    07-07    150<H>  |  113<H>  |  76<H>  ----------------------------<  248<H>  4.0   |  30  |  1.70<H>    Ca    8.1<L>      07 Jul 2022 05:52  Phos  2.8     07-07  Mg     2.5     07-07    TPro  5.8<L>  /  Alb  2.2<L>  /  TBili  1.3<H>  /  DBili  x   /  AST  141<H>  /  ALT  998<H>  /  AlkPhos  156<H>  07-07                        10.0   14.52 )-----------( 151      ( 07 Jul 2022 05:52 )             34.1         Telemetry: Ventricular paced

## 2022-07-08 NOTE — PROGRESS NOTE ADULT - SUBJECTIVE AND OBJECTIVE BOX
CAPILLARY BLOOD GLUCOSE      POCT Blood Glucose.: 175 mg/dL (07 Jul 2022 10:58)  POCT Blood Glucose.: 221 mg/dL (07 Jul 2022 09:09)  POCT Blood Glucose.: 245 mg/dL (07 Jul 2022 08:08)      Vital Signs Last 24 Hrs  T(C): 37.3 (07 Jul 2022 21:00), Max: 37.3 (07 Jul 2022 21:00)  T(F): 99.1 (07 Jul 2022 21:00), Max: 99.1 (07 Jul 2022 21:00)  HR: 82 (08 Jul 2022 07:00) (70 - 88)  BP: 68/45 (08 Jul 2022 07:00) (62/42 - 107/51)  BP(mean): 52 (08 Jul 2022 07:00) (45 - 74)  RR: 11 (08 Jul 2022 07:00) (9 - 33)  SpO2: 95% (08 Jul 2022 07:00) (68% - 100%)        Respiratory: CTA B/L  CV: RRR, S1S2, no murmurs, rubs or gallops  Abdominal: Soft, NT, ND +BS, Last BM  Extremities: No edema, + peripheral pulses     07-07    150<H>  |  113<H>  |  76<H>  ----------------------------<  248<H>  4.0   |  30  |  1.70<H>    Ca    8.1<L>      07 Jul 2022 05:52  Phos  2.8     07-07  Mg     2.5     07-07    TPro  5.8<L>  /  Alb  2.2<L>  /  TBili  1.3<H>  /  DBili  x   /  AST  141<H>  /  ALT  998<H>  /  AlkPhos  156<H>  07-07

## 2022-07-08 NOTE — PROGRESS NOTE ADULT - PROBLEM SELECTOR PROBLEM 1
Type 2 diabetes mellitus

## 2022-07-08 NOTE — PROGRESS NOTE ADULT - PROVIDER SPECIALTY LIST ADULT
Critical Care
Endocrinology
Nephrology
Cardiology
Critical Care
Infectious Disease
Nephrology
Pulmonology
Cardiology
Critical Care
Critical Care
Diabetes
Family Medicine
Infectious Disease
Nephrology
Pulmonology
Pulmonology
Critical Care
Nephrology
Endocrinology
Endocrinology
Hospitalist
Family Medicine

## 2022-07-08 NOTE — PROGRESS NOTE ADULT - SUBJECTIVE AND OBJECTIVE BOX
Patient is a 77y old  Male who presents with a chief complaint of Substernal chest pain since last night (05 Jul 2022 07:31)    Patient seen in follow up for JIMENEZ.        PAST MEDICAL HISTORY:  Lung cancer, upper lobe, left    Heart attack    Stented coronary artery    Sleep apnea    HTN (hypertension)    High cholesterol    Diabetes    Insulin pump in place    Hiatal hernia    Vertigo    Stented coronary artery    Type 2 diabetes mellitus    TIA (transient ischemic attack)    Chronic back pain    Spinal stenosis in cervical region    Spinal stenosis of lumbar region    Lumbar herniated disc    OA (osteoarthritis)    CHF (congestive heart failure)    Cellulitis        MEDICATIONS  (STANDING):  glycopyrrolate Injectable 0.2 milliGRAM(s) IV Push every 8 hours  HYDROmorphone Infusion 3 mG/Hr (3 mL/Hr) IV Continuous <Continuous>    MEDICATIONS  (PRN):  HYDROmorphone  Injectable 1 milliGRAM(s) IV Push every 1 hour PRN dyspnea    T(C): 37.3 (07-07-22 @ 21:00), Max: 38.4 (07-06-22 @ 16:21)  HR: 100 (07-08-22 @ 13:00) (59 - 120)  BP: 64/45 (07-08-22 @ 13:00) (62/42 - 115/62)  RR: 12 (07-08-22 @ 13:00)  SpO2: 94% (07-08-22 @ 13:00)  Wt(kg): --  I&O's Detail    07 Jul 2022 07:01  -  08 Jul 2022 07:00  --------------------------------------------------------  IN:    dextrose 5%: 400 mL    DOBUTamine: 50 mL    HYRDOmorphone: 16 mL    HYRDOmorphone: 30 mL    Insulin: 35.9 mL    Nepro with Carb Steady: 200 mL    Norepinephrine: 143.2 mL    Propofol: 37.5 mL  Total IN: 912.6 mL    OUT:  Total OUT: 0 mL    Total NET: 912.6 mL      08 Jul 2022 07:01  -  08 Jul 2022 13:30  --------------------------------------------------------  IN:    HYRDOmorphone: 18 mL  Total IN: 18 mL    OUT:    Indwelling Catheter - Urethral (mL): 450 mL  Total OUT: 450 mL    Total NET: -432 mL      PHYSICAL EXAM:  General: on vent  Respiratory: b/l air entry  Cardiovascular: S1 S2  Gastrointestinal: soft  Extremities:  edema        LABORATORY:                        10.0   14.52 )-----------( 151      ( 07 Jul 2022 05:52 )             34.1     07-07    150<H>  |  113<H>  |  76<H>  ----------------------------<  248<H>  4.0   |  30  |  1.70<H>    Ca    8.1<L>      07 Jul 2022 05:52  Phos  2.8     07-07  Mg     2.5     07-07    TPro  5.8<L>  /  Alb  2.2<L>  /  TBili  1.3<H>  /  DBili  x   /  AST  141<H>  /  ALT  998<H>  /  AlkPhos  156<H>  07-07    Sodium, Serum: 150 mmol/L (07-07 @ 05:52)  Sodium, Serum: 159 mmol/L (07-06 @ 15:25)    Potassium, Serum: 4.0 mmol/L (07-07 @ 05:52)  Potassium, Serum: 4.4 mmol/L (07-06 @ 15:25)    Hemoglobin: 10.0 g/dL (07-07 @ 05:52)  Hemoglobin: 11.3 g/dL (07-06 @ 06:27)    Creatinine, Serum 1.70 (07-07 @ 05:52)  Creatinine, Serum 1.80 (07-06 @ 15:25)  Creatinine, Serum 1.80 (07-06 @ 06:27)  Creatinine, Serum 2.10 (07-05 @ 16:05)        LIVER FUNCTIONS - ( 07 Jul 2022 05:52 )  Alb: 2.2 g/dL / Pro: 5.8 g/dL / ALK PHOS: 156 U/L / ALT: 998 U/L / AST: 141 U/L / GGT: x

## 2022-07-08 NOTE — PROGRESS NOTE ADULT - ASSESSMENT
The patient is a 77 year old male with a history of DM, CAD with prior MI s/p multiple PCI, chronic systolic heart failure s/p CRT-D who presents with chest pain and shortness of breath in the setting of acute systolic heart failure, cardiogenic shock, possible NSTEMI.    Plan:  - Patient was extubated yesterday  - All drips and cardiac meds were turned off  - Comfort measures only

## 2022-07-08 NOTE — PROGRESS NOTE ADULT - SUBJECTIVE AND OBJECTIVE BOX
****** CHARTING IN PROGRESS *******    INTERVAL HPI/OVERNIGHT EVENTS: No acute overnight events. Febrile Tmax: 101.1 F at 16:21. Pt was made comfort measures yesterday on Dilaudid drip.     SUBJECTIVE: Patient seen and examined at bedside.     ROS: Unable to obtain as pt is intubated     OBJECTIVE:    VITAL SIGNS:  ICU Vital Signs Last 24 Hrs  T(C): 37.3 (07 Jul 2022 21:00), Max: 37.3 (07 Jul 2022 21:00)  T(F): 99.1 (07 Jul 2022 21:00), Max: 99.1 (07 Jul 2022 21:00)  HR: 86 (08 Jul 2022 06:00) (70 - 88)  BP: 66/45 (08 Jul 2022 06:00) (62/42 - 107/51)  BP(mean): 51 (08 Jul 2022 06:00) (45 - 74)  ABP: --  ABP(mean): --  RR: 11 (08 Jul 2022 06:00) (9 - 33)  SpO2: 95% (08 Jul 2022 06:00) (68% - 100%)      Mode: AC/ CMV (Assist Control/ Continuous Mandatory Ventilation), RR (machine): 22, TV (machine): 450, FiO2: 30, PEEP: 5, ITime: 1, MAP: 13, PIP: 25    07-07 @ 07:01  -  07-08 @ 07:00  --------------------------------------------------------  IN: 912.6 mL / OUT: 0 mL / NET: 912.6 mL      CAPILLARY BLOOD GLUCOSE      POCT Blood Glucose.: 175 mg/dL (07 Jul 2022 10:58)      PHYSICAL EXAM:  General: NAD  HEENT: NCAT, PERRL, clear conjunctiva  Respiratory: diminished BS BL  Cardiovascular: RRR, normal S1S2, no M/R/G  Abdomen: soft, NT/ND, bowel sounds in all four quadrants, no palpable masses, obese  Extremities: no clubbing, cyanosis, or edema, BLE cool to touch     MEDICATIONS:  MEDICATIONS  (STANDING):  glycopyrrolate Injectable 0.2 milliGRAM(s) IV Push every 8 hours  HYDROmorphone Infusion 3 mG/Hr (3 mL/Hr) IV Continuous <Continuous>    MEDICATIONS  (PRN):  HYDROmorphone  Injectable 1 milliGRAM(s) IV Push every 1 hour PRN dyspnea      ALLERGIES:  Allergies    No Known Allergies    Intolerances        LABS:                        10.0   14.52 )-----------( 151      ( 07 Jul 2022 05:52 )             34.1     07-07    150<H>  |  113<H>  |  76<H>  ----------------------------<  248<H>  4.0   |  30  |  1.70<H>    Ca    8.1<L>      07 Jul 2022 05:52  Phos  2.8     07-07  Mg     2.5     07-07    TPro  5.8<L>  /  Alb  2.2<L>  /  TBili  1.3<H>  /  DBili  x   /  AST  141<H>  /  ALT  998<H>  /  AlkPhos  156<H>  07-07    PT/INR - ( 07 Jul 2022 05:52 )   PT: 13.9 sec;   INR: 1.19 ratio         PTT - ( 07 Jul 2022 05:52 )  PTT:29.7 sec      RADIOLOGY & ADDITIONAL TESTS: Reviewed. ***  BEDSIDE LUNG ULTRASOUND: ***  BEDSIDE ECHO: ***    CENTRAL LINE: RIJ  TLC      DATE INSERTED:               PETERSON: Y                       DATE INSERTED: 7/2                A-LINE: N                       DATE INSERTED:                    GLOBAL ISSUE/BEST PRACTICE  Analgesia: Dilaudid   Sedation: Propofol  HOB elevation: yes  Stress ulcer prophylaxis: Protonix   VTE prophylaxis: Heparin drip  Glycemic control: Y  Nutrition: NPO with Tube Feeds     CODE STATUS: DNR, comfort measures only  INTERVAL HPI/OVERNIGHT EVENTS: No acute overnight events. Febrile Tmax: 101.1 F at 16:21. Pt was made comfort measures yesterday on Dilaudid drip.     SUBJECTIVE: Patient seen and examined at bedside. Son at bedside.    ROS: Unable to obtain    OBJECTIVE:    VITAL SIGNS:  ICU Vital Signs Last 24 Hrs  T(C): 37.3 (07 Jul 2022 21:00), Max: 37.3 (07 Jul 2022 21:00)  T(F): 99.1 (07 Jul 2022 21:00), Max: 99.1 (07 Jul 2022 21:00)  HR: 86 (08 Jul 2022 06:00) (70 - 88)  BP: 66/45 (08 Jul 2022 06:00) (62/42 - 107/51)  BP(mean): 51 (08 Jul 2022 06:00) (45 - 74)  ABP: --  ABP(mean): --  RR: 11 (08 Jul 2022 06:00) (9 - 33)  SpO2: 95% (08 Jul 2022 06:00) (68% - 100%)      Mode: AC/ CMV (Assist Control/ Continuous Mandatory Ventilation), RR (machine): 22, TV (machine): 450, FiO2: 30, PEEP: 5, ITime: 1, MAP: 13, PIP: 25    07-07 @ 07:01  -  07-08 @ 07:00  --------------------------------------------------------  IN: 912.6 mL / OUT: 0 mL / NET: 912.6 mL      CAPILLARY BLOOD GLUCOSE      POCT Blood Glucose.: 175 mg/dL (07 Jul 2022 10:58)      PHYSICAL EXAM:  General: NAD  HEENT: NCAT, PERRL, clear conjunctiva  Respiratory: diminished BS BL  Cardiovascular: RRR, normal S1S2, no M/R/G  Abdomen: soft, NT/ND, bowel sounds in all four quadrants, no palpable masses, obese  Extremities: no clubbing, cyanosis, or edema, BLE cool to touch     MEDICATIONS:  MEDICATIONS  (STANDING):  glycopyrrolate Injectable 0.2 milliGRAM(s) IV Push every 8 hours  HYDROmorphone Infusion 3 mG/Hr (3 mL/Hr) IV Continuous <Continuous>    MEDICATIONS  (PRN):  HYDROmorphone  Injectable 1 milliGRAM(s) IV Push every 1 hour PRN dyspnea      ALLERGIES:  Allergies    No Known Allergies    Intolerances        LABS:                        10.0   14.52 )-----------( 151      ( 07 Jul 2022 05:52 )             34.1     07-07    150<H>  |  113<H>  |  76<H>  ----------------------------<  248<H>  4.0   |  30  |  1.70<H>    Ca    8.1<L>      07 Jul 2022 05:52  Phos  2.8     07-07  Mg     2.5     07-07    TPro  5.8<L>  /  Alb  2.2<L>  /  TBili  1.3<H>  /  DBili  x   /  AST  141<H>  /  ALT  998<H>  /  AlkPhos  156<H>  07-07    PT/INR - ( 07 Jul 2022 05:52 )   PT: 13.9 sec;   INR: 1.19 ratio         PTT - ( 07 Jul 2022 05:52 )  PTT:29.7 sec      RADIOLOGY & ADDITIONAL TESTS: Reviewed.     CENTRAL LINE: RIJ  TLC      DATE INSERTED:               PETERSON: Y                       DATE INSERTED: 7/2                A-LINE: N                       DATE INSERTED:                    GLOBAL ISSUE/BEST PRACTICE  Analgesia: Dilaudid   Sedation: Propofol  HOB elevation: yes  Stress ulcer prophylaxis: Protonix   VTE prophylaxis: Heparin drip  Glycemic control: Y  Nutrition: NPO with Tube Feeds     CODE STATUS: DNR, comfort measures only  INTERVAL HPI/OVERNIGHT EVENTS: No acute overnight events. Febrile Tmax: 101.1 F at 16:21. Pt was made comfort measures yesterday on Dilaudid drip.     SUBJECTIVE: Patient seen and examined at bedside. Son at bedside.    ROS: Unable to obtain due to poor mentation    OBJECTIVE:    VITAL SIGNS:  ICU Vital Signs Last 24 Hrs  T(C): 37.3 (07 Jul 2022 21:00), Max: 37.3 (07 Jul 2022 21:00)  T(F): 99.1 (07 Jul 2022 21:00), Max: 99.1 (07 Jul 2022 21:00)  HR: 86 (08 Jul 2022 06:00) (70 - 88)  BP: 66/45 (08 Jul 2022 06:00) (62/42 - 107/51)  BP(mean): 51 (08 Jul 2022 06:00) (45 - 74)  ABP: --  ABP(mean): --  RR: 11 (08 Jul 2022 06:00) (9 - 33)  SpO2: 95% (08 Jul 2022 06:00) (68% - 100%)      Mode: AC/ CMV (Assist Control/ Continuous Mandatory Ventilation), RR (machine): 22, TV (machine): 450, FiO2: 30, PEEP: 5, ITime: 1, MAP: 13, PIP: 25    07-07 @ 07:01  -  07-08 @ 07:00  --------------------------------------------------------  IN: 912.6 mL / OUT: 0 mL / NET: 912.6 mL      CAPILLARY BLOOD GLUCOSE      POCT Blood Glucose.: 175 mg/dL (07 Jul 2022 10:58)      PHYSICAL EXAM:  General: NAD  HEENT: NCAT, PERRL, clear conjunctiva  Respiratory: diminished BS BL  Cardiovascular: RRR, normal S1S2, no M/R/G  Abdomen: soft, NT/ND, bowel sounds in all four quadrants, no palpable masses, obese  Extremities: no clubbing, cyanosis, or edema, BLE cool to touch     MEDICATIONS:  MEDICATIONS  (STANDING):  glycopyrrolate Injectable 0.2 milliGRAM(s) IV Push every 8 hours  HYDROmorphone Infusion 3 mG/Hr (3 mL/Hr) IV Continuous <Continuous>    MEDICATIONS  (PRN):  HYDROmorphone  Injectable 1 milliGRAM(s) IV Push every 1 hour PRN dyspnea      ALLERGIES:  Allergies    No Known Allergies    Intolerances        LABS:                        10.0   14.52 )-----------( 151      ( 07 Jul 2022 05:52 )             34.1     07-07    150<H>  |  113<H>  |  76<H>  ----------------------------<  248<H>  4.0   |  30  |  1.70<H>    Ca    8.1<L>      07 Jul 2022 05:52  Phos  2.8     07-07  Mg     2.5     07-07    TPro  5.8<L>  /  Alb  2.2<L>  /  TBili  1.3<H>  /  DBili  x   /  AST  141<H>  /  ALT  998<H>  /  AlkPhos  156<H>  07-07    PT/INR - ( 07 Jul 2022 05:52 )   PT: 13.9 sec;   INR: 1.19 ratio         PTT - ( 07 Jul 2022 05:52 )  PTT:29.7 sec      RADIOLOGY & ADDITIONAL TESTS: Reviewed.     CENTRAL LINE: RIJ  TLC      DATE INSERTED:               PETERSON: Y                       DATE INSERTED: 7/2                A-LINE: N                       DATE INSERTED:                    GLOBAL ISSUE/BEST PRACTICE  Analgesia: Dilaudid   Sedation: Propofol  HOB elevation: yes  Stress ulcer prophylaxis: Protonix   VTE prophylaxis: Heparin drip  Glycemic control: Y  Nutrition: NPO with Tube Feeds     CODE STATUS: DNR, comfort measures only

## 2022-07-08 NOTE — CHART NOTE - NSCHARTNOTEFT_GEN_A_CORE
Do you have Advance Directives (HCP / LV / Organ donation / Documentation of oral advance Directive):   (  x  )  yes    (      )    NO                                                                            Do you have LV - Living will :                                                                                                                                             (   x )  yes    (      )   No    Do you have HCP - Health Care Proxy:                                                                                                                            (   x  )  yes   (       ) N0    Do you have DNR- Do Not Resuscitate :                                                                                                                           (  x    )  yes  (        )  No    Do you have DNI- Do Not intubate  :                                                                                                                               (   x   )  yes   (       ) No    Do you have MOLST - Medical orders for Life sustaining treatment  :                                                                    (   x   ) yes    (       ) No    Decision Maker :  (     ) Patient     (    x  )  HCA   (     ) Public Health Law Surrogate     (      ) Surrogate  (       ) Guardian    Goals of Care :  (      )   Complete Care     (       ) No Limitations                              (    x   )   Comfort Care       (       )  Hospice                               (  x    )   Limited medical Intervention / s    Medical Interventions :   (        )   CPR       (  x      )  DNR                                               (        )  Intubation with MV - Mechanical Ventilation  (      ) BIPAP/CPAP    (      x   )   DNI                                               (         )  Artificial Nutrition -  IVF, TPN / PPN, Tube Feeds             (    x     )   No Feeding Tube                                                (        ) Use Antibiotics                         (        x  ) No Antibiotics                                                (         ) Blood and Blood Products     (   x      )   No Blood or Blood products                                                (          )  Dialysis                                    (   x      )  No Dialysis                                                (          )  Medical Management only  (    x     )  No Invasive Interventions or Surgery  Time spent :                        (    x   ) upto 30 minutes                       (     x      )   more than 30 minutes  ACP and GOC reviewed and discussed

## 2022-07-08 NOTE — PROGRESS NOTE ADULT - PROBLEM SELECTOR PLAN 1
Type 2 DM A1c 8.6% adm  spoke w/ Dr. Ace  add 4 Units ademelog premeal  cont CC diet  Cont ACcucheck ACHS  cont PANCHO  Cont Lantus 26 Units @ HS  Recommend endocrine-Perlman onconsult  FU appt: TBA  DSC recommendations: return to home regimen and glucose monitoring  diabetes education provided  Diabetes support info and cell # [phone] given   Goal 100-180 mg/dL; 140-180 mg/dL in critical care areas
cont insulin gtt  cont supportive care  goal bg 140-180 in icu setting
lantus 20 units 2x/day restarted  recommend change mod dose admelog corrective q6hrs  goal bg 140-180 in icu setting
FS with RI coverage as SS  endo eval if necessary
admelog corrective scale coverage alone  goal bg conservative mngmt
comfort care
FS with RI coverage as SS  endo eval if necessary
FS with RI coverage as SS
FS with RI coverage as SS  endo eval if necessary

## 2022-07-08 NOTE — PROGRESS NOTE ADULT - SUBJECTIVE AND OBJECTIVE BOX
Patient is a 77y old  Male who presents with a chief complaint of Substernal chest pain since last night (08 Jul 2022 13:29)      INTERVAL /OVERNIGHT EVENTS: now on comfort care    MEDICATIONS  (STANDING):  glycopyrrolate Injectable 0.2 milliGRAM(s) IV Push every 8 hours  HYDROmorphone Infusion 3 mG/Hr (3 mL/Hr) IV Continuous <Continuous>    MEDICATIONS  (PRN):  HYDROmorphone  Injectable 1 milliGRAM(s) IV Push every 1 hour PRN dyspnea      Allergies    No Known Allergies    Intolerances        REVIEW OF SYSTEMS: unable to obtain    Vital Signs Last 24 Hrs  T(C): 37.3 (07 Jul 2022 21:00), Max: 37.3 (07 Jul 2022 21:00)  T(F): 99.1 (07 Jul 2022 21:00), Max: 99.1 (07 Jul 2022 21:00)  HR: 100 (08 Jul 2022 17:00) (61 - 100)  BP: 67/49 (08 Jul 2022 17:00) (62/42 - 76/46)  BP(mean): 54 (08 Jul 2022 17:00) (48 - 56)  RR: 16 (08 Jul 2022 17:00) (9 - 26)  SpO2: 93% (08 Jul 2022 17:00) (91% - 96%)    Parameters below as of 08 Jul 2022 13:00  Patient On (Oxygen Delivery Method): nasal cannula  O2 Flow (L/min): 0.3      PHYSICAL EXAM:  GENERAL: NAD, well-groomed, well-developed  HEAD:  Atraumatic, Normocephalic  EYES: EOMI, PERRLA, conjunctiva and sclera clear  ENMT: No tonsillar erythema, exudates, or enlargement; Moist mucous membranes, Good dentition, No lesions  NECK: Supple, No JVD, Normal thyroid  NERVOUS SYSTEM:  ; Motor Strength 5/5 B/L upper and lower extremities; DTRs 2+ intact and symmetric  CHEST/LUNG: Clear to auscultation bilaterally; No rales, rhonchi, wheezing, or rubs  HEART: Regular rate and rhythm; No murmurs, rubs, or gallops  ABDOMEN: Soft, Nontender, Nondistended; Bowel sounds present  EXTREMITIES:  2+ Peripheral Pulses, No clubbing, cyanosis, or edema  LYMPH: No lymphadenopathy noted  SKIN: No rashes or lesions    LABS:      Ca    8.1        07 Jul 2022 05:52      PT/INR - ( 07 Jul 2022 05:52 )   PT: 13.9 sec;   INR: 1.19 ratio         PTT - ( 07 Jul 2022 05:52 )  PTT:29.7 sec    CAPILLARY BLOOD GLUCOSE          RADIOLOGY & ADDITIONAL TESTS:    Notes Reviewed:  [x ] YES  [ ] NO    Care Discussed with Consultants/Other Providers [x ] YES  [ ] NO

## 2022-07-08 NOTE — PROGRESS NOTE ADULT - ASSESSMENT
77M significant PMH CAD s/p MEL x 6 (most recent MI 2 mo ago), HFrEF, BiV-ICD, chronic back pain, DM2 on metformin, Hiatal hernia, HLD, HTN, Lung CA, and PAPO originally a/w NSTEMI with cardiogenic shock component and downgraded.  Now return to ICU for ADHF, Cardiogenic shock, acute hypoxemic RF 2/2 cardiogenic pulmonary edema, worsening JIMENEZ and transaminitis. Now with component of septic shock.   Plan for terminal extubation today.     Neuro:   - Sedated on Propofol, will wean as tolerated   - Plan for terminal extubation today    CV:   - ADHF  - Cardiogenic shock has resolved  - Will discontinue Levophed and Dobutamine  - On ASA and Brilinta for CAD, will discontinue as pt will be terminally extubated today   - TTE: severe reduced LV systolic function, EF:30%, increase LV wall thickness, mild pulmonary HTN  - Off of statin given shock liver       Resp:   - Acute hypoxemic respiratory failure 2/2 cardiogenic pulm edema  - Continue lung protective ventilation AC/CMV: 22/450/5/30  - CXR 7/2: Airspace opacities slightly improved on the left and stable on the right. May be due to pulmonary edema vs underlying infection/inflammation, repeat CXR 7/5: RUL clearing   - Chest PT and suctioning  - Plan for terminal extubation today, start Dilaudid drip, Dilaudid 1 mg q1h PRN for dyspnea, Valium 2.5 mg IVP x1 and Glycopyrrolate .2 mg q8h for secretions     GI:   - NPO with TF  - Shock liver, transaminitis improving  - Off of statin given shock liver      Renal:  - JIMENEZ likely pre renal  - Free water 300 cc q6h and D5 @100 cc/hr, will discontinue as pt will be terminally extubated today      ID:   - Fever overnight   - On Zosyn, will discontinue as pt will be terminally extubated today   - 6/30 BCx x2 negative  - 7/2 BCx x2 NGTD  - 7/2 Sputum cx with Moraxella   - MRSA negative     Heme:   - On ASA and Brilinta, will discontinue as pt will be terminally extubated today   - DVT PPX HSQ, will discontinue as pt will be terminally extubated today     Endo:  - DMT2 on ISS  - Given hyperglycemia, on insulin drip with q1h FS, will discontinue as pt will be terminally extubated today    - Goal -180    Skin:   - Bryant  - RIJ central line     Dispo:  Critically ill requiring ICU level of care.    Pt is now DNR, plan for terminal extubation today around noon  Start Dilaudid drip, Dilaudid 1 mg q1h PRN for dyspnea, Valium 2.5 mg IVP x1 and Glycopyrrolate .2 mg q8h for secretions  77M significant PMH CAD s/p MEL x 6 (most recent MI 2 mo ago), HFrEF, BiV-ICD, chronic back pain, DM2 on metformin, Hiatal hernia, HLD, HTN, Lung CA, and PAPO originally a/w NSTEMI with cardiogenic shock component and downgraded.  Now return to ICU for ADHF, Cardiogenic shock, acute hypoxemic RF 2/2 cardiogenic pulmonary edema, worsening JIMENEZ and transaminitis. Now with component of septic shock. s/p Terminal extubation 7/7, now comfort measures only.     Neuro:   - No longer sedated   - Comfort measures     CV:   - End stage ADHF; Cardiogenic shock has resolved  - TTE: severe reduced LV systolic function, EF:30%, increase LV wall thickness, mild pulmonary HTN  - Comfort measures     Resp:   - Continue Dilaudid drip, Dilaudid 1 mg q1h PRN for dyspnea, and Glycopyrrolate .2 mg q8h for secretions   - Comfort measures     GI:   - NPO  - Comfort measures     Renal:  - JIMENEZ likely pre renal  - Comfort measures     ID:   - Off of abx  - Comfort measures     Heme:   - No active issues  - Comfort measures     Endo:  - DMT2 on ISS  - Off of insulin drip  - Comfort measures     Skin:   - Bryant    Dispo:  Comfort measures only  Pt is now DNR, plan for terminal extubation today around noon  Start Dilaudid drip, Dilaudid 1 mg q1h PRN for dyspnea, Valium 2.5 mg IVP x1 and Glycopyrrolate .2 mg q8h for secretions  77M significant PMH CAD s/p MEL x 6 (most recent MI 2 mo ago), HFrEF, BiV-ICD, chronic back pain, DM2 on metformin, Hiatal hernia, HLD, HTN, Lung CA, and PAPO originally a/w NSTEMI with cardiogenic shock component and downgraded.  Now return to ICU for ADHF, Cardiogenic shock, acute hypoxemic RF 2/2 cardiogenic pulmonary edema, worsening JIMENEZ and transaminitis. Now with component of septic shock. s/p Terminal extubation 7/7, now comfort measures only.     Neuro:   - No longer sedated   - Comfort measures     CV:   - End stage ADHF; Cardiogenic shock has resolved  - TTE: severe reduced LV systolic function, EF:30%, increase LV wall thickness, mild pulmonary HTN  - Comfort measures     Resp:   - Continue Dilaudid drip, Dilaudid 1 mg q1h PRN for dyspnea, and Glycopyrrolate .2 mg q8h for secretions   - Comfort measures     GI:   - NPO  - Comfort measures     Renal:  - JIMENEZ likely pre renal  - Comfort measures     ID:   - Off of abx  - Comfort measures     Heme:   - No active issues  - Comfort measures     Endo:  - DMT2 on ISS  - Off of insulin drip  - Comfort measures     Skin:   - Bryant    Dispo:  Comfort measures only  Pt is now DNR, plan for terminal extubation today around noon  Continue Dilaudid drip, Dilaudid 1 mg q1h PRN for dyspnea, Valium 2.5 mg IVP x1 and Glycopyrrolate .2 mg q8h for secretions

## 2022-07-08 NOTE — PROGRESS NOTE ADULT - REASON FOR ADMISSION
Substernal chest pain since last night
Substernal chest pain
Substernal chest pain since last night

## 2022-07-08 NOTE — PROGRESS NOTE ADULT - PROBLEM SELECTOR PLAN 2
comfort care
IV Heparin, BB ASA
IV Heparin, BB ASA  cardio eval
IV Heparin, BB ASA  cardio eval with Dr. peguero
IV Heparin, BB ASA  cardio eval with Dr. peguero appreciated
IV Heparin, BB ASA  cardio eval with Dr. peguero
IV Heparin, BB ASA  cardio eval with Dr. peguero appreciated

## 2022-07-08 NOTE — PROGRESS NOTE ADULT - PROBLEM SELECTOR PROBLEM 2
Acute MI, subendocardial

## 2022-07-08 NOTE — PROGRESS NOTE ADULT - PROBLEM SELECTOR PROBLEM 3
Lung cancer, upper lobe

## 2022-07-09 NOTE — CHART NOTE - NSCHARTNOTEFT_GEN_A_CORE
Critical Care [facility]ACELIS Adams    Called to pronounce BEKAH FISCHERSIO dead.  Patient is unresponsive to verbal and tactile stimuli.    Patient is not breathing.  No air entry heard.  No pulses felt.    No heart sounds heard.  Pupils are fixed and dilated bilaterally.    Patient pronounced dead at 0239 hours July 9th 2022.  Family Son and Grandsons at bedside at time of pronouncement.    Nadeem Adams PAC

## 2022-09-30 LAB
BASE EXCESS BLDA CALC-SCNC: 9.3 MMOL/L — HIGH (ref -2–3)
HCO3 BLDA-SCNC: 33 MMOL/L — HIGH (ref 21–28)
HOROWITZ INDEX BLDA+IHG-RTO: 40 — SIGNIFICANT CHANGE UP
PCO2 BLDA: 46 MMHG — SIGNIFICANT CHANGE UP (ref 35–48)
PH BLDA: 7.46 — HIGH (ref 7.35–7.45)
PO2 BLDA: 124 MMHG — HIGH (ref 83–108)
SAO2 % BLDA: 99.2 % — HIGH (ref 94–98)

## 2023-01-17 NOTE — ED ADULT NURSE NOTE - PAIN: PRESENCE, MLM
denies pain/discomfort Cantharidin Pregnancy And Lactation Text: The use of this medication during pregnancy or lactation is not recommended as there is insufficient data.

## 2023-05-03 NOTE — PROCEDURE NOTE - NSPOSTCAREGUIDE_GEN_A_CORE
no lesions,  no deformities
Care for catheter as per unit/ICU protocols
Care for catheter as per unit/ICU protocols

## 2023-10-07 NOTE — PROGRESS NOTE ADULT - PROBLEM/PLAN-5
DISPLAY PLAN FREE TEXT
03-Oct-2023 16:04
DISPLAY PLAN FREE TEXT
